# Patient Record
Sex: MALE | Race: WHITE | NOT HISPANIC OR LATINO | Employment: FULL TIME | URBAN - NONMETROPOLITAN AREA
[De-identification: names, ages, dates, MRNs, and addresses within clinical notes are randomized per-mention and may not be internally consistent; named-entity substitution may affect disease eponyms.]

---

## 2017-01-09 ENCOUNTER — OFFICE VISIT (OUTPATIENT)
Dept: PODIATRY | Facility: CLINIC | Age: 37
End: 2017-01-09

## 2017-01-09 VITALS
SYSTOLIC BLOOD PRESSURE: 140 MMHG | HEART RATE: 90 BPM | HEIGHT: 74 IN | BODY MASS INDEX: 40.43 KG/M2 | WEIGHT: 315 LBS | DIASTOLIC BLOOD PRESSURE: 80 MMHG

## 2017-01-09 DIAGNOSIS — G89.29 CHRONIC PAIN OF RIGHT ANKLE: Primary | ICD-10-CM

## 2017-01-09 DIAGNOSIS — M25.571 CHRONIC PAIN OF RIGHT ANKLE: Primary | ICD-10-CM

## 2017-01-09 DIAGNOSIS — Q68.8 OS TRIGONUM SYNDROME: ICD-10-CM

## 2017-01-09 DIAGNOSIS — M21.371 FOOT DROP, RIGHT: ICD-10-CM

## 2017-01-09 PROCEDURE — 99213 OFFICE O/P EST LOW 20 MIN: CPT | Performed by: PODIATRIST

## 2017-01-09 PROCEDURE — 96372 THER/PROPH/DIAG INJ SC/IM: CPT | Performed by: PODIATRIST

## 2017-01-09 PROCEDURE — 20605 DRAIN/INJ JOINT/BURSA W/O US: CPT | Performed by: PODIATRIST

## 2017-01-09 NOTE — PROGRESS NOTES
1.5cc Marcaine 0.5% NDC 6618-8224-43, Lot# [id number], Exp 1 May 2018  1.5cc Dexamethasone 10mg.ml NDC 1617-2770-66, Lot#[id number], Exp 2/2018

## 2017-01-09 NOTE — PROGRESS NOTES
Procedure    Medium Joint Arthrocentesis  Date/Time: 1/9/2017 12:05 PM  Consent given by: patient  Site marked: site marked  Timeout: Immediately prior to procedure a time out was called to verify the correct patient, procedure, equipment, support staff and site/side marked as required   Supporting Documentation  Indications: pain and diagnostic evaluation   Procedure Details  Location: ankle - R ankle  Needle size: 22 G  Approach: anterolateral  Medication group details: 1.5cc 0.5% Marcaine plain; 1.5cc Dexamethasone.  Patient tolerance: patient tolerated the procedure well with no immediate complications

## 2017-01-09 NOTE — MR AVS SNAPSHOT
Kirill Mendoza   1/9/2017 11:00 AM   Office Visit    Dept Phone:  [phone]   Encounter #:  [id number]    Provider:  Bunny Jc DPM   Department:  [facility]                Your Full Care Plan              Your Updated Medication List          This list is accurate as of: 1/9/17 11:45 AM.  Always use your most recent med list.                aspirin 81 MG EC tablet       atorvastatin 20 MG tablet   Commonly known as:  LIPITOR       metFORMIN 1000 MG tablet   Commonly known as:  GLUCOPHAGE       TRADJENTA 5 MG tablet tablet   Generic drug:  linagliptin               You Were Diagnosed With        Codes Comments    Chronic pain of right ankle    -  Primary ICD-10-CM: M25.571, G89.29  ICD-9-CM: 719.47, 338.29     Os trigonum syndrome     ICD-10-CM: Q68.8  ICD-9-CM: 755.69     Foot drop, right     ICD-10-CM: M21.371  ICD-9-CM: 736.79       Instructions    Joint Injection, Care After  Refer to this sheet in the next few days. These instructions provide you with information on caring for yourself after you have had a joint injection. Your caregiver also may give you more specific instructions. Your treatment has been planned according to current medical practices, but problems sometimes occur. Call your caregiver if you have any problems or questions after your procedure.  After any type of joint injection, it is not uncommon to experience:  · Soreness, swelling, or bruising around the injection site.  · Mild numbness, tingling, or weakness around the injection site caused by the numbing medicine used before or with the injection.  It also is possible to experience the following effects associated with the specific agent after injection:  · Iodine-based contrast agents:    Allergic reaction (itching, hives, widespread redness, and swelling beyond the injection site).  · Corticosteroids (These effects are rare.):    Allergic reaction.    Increased blood sugar  levels (If you have diabetes and you notice that your blood sugar levels have increased, notify your caregiver).    Increased blood pressure levels.    Mood swings.  · Hyaluronic acid in the use of viscosupplementation.    Temporary heat or redness.    Temporary rash and itching.    Increased fluid accumulation in the injected joint.  These effects all should resolve within a day after your procedure.   HOME CARE INSTRUCTIONS  · Limit yourself to light activity the day of your procedure. Avoid lifting heavy objects, bending, stooping, or twisting.  · Take prescription or over-the-counter pain medication as directed by your caregiver.  · You may apply ice to your injection site to reduce pain and swelling the day of your procedure. Ice may be applied 03-04 times:    Put ice in a plastic bag.    Place a towel between your skin and the bag.    Leave the ice on for no longer than 15-20 minutes each time.  SEEK IMMEDIATE MEDICAL CARE IF:   · Pain and swelling get worse rather than better or extend beyond the injection site.  · Numbness does not go away.  · Blood or fluid continues to leak from the injection site.  · You have chest pain.  · You have swelling of your face or tongue.  · You have trouble breathing or you become dizzy.  · You develop a fever, chills, or severe tenderness at the injection site that last longer than 1 day.  MAKE SURE YOU:  · Understand these instructions.  · Watch your condition.  · Get help right away if you are not doing well or if you get worse.     This information is not intended to replace advice given to you by your health care provider. Make sure you discuss any questions you have with your health care provider.     Document Released: 08/30/2012 Document Revised: 01/08/2016 Document Reviewed: 08/30/2012  [employer] Interactive Patient Education ©2016 [employer]    Ankle Pain  Ankle pain is a common symptom. The bones, cartilage, tendons, and muscles of the ankle joint perform a lot of  work each day. The ankle joint holds your body weight and allows you to move around. Ankle pain can occur on either side or back of 1 or both ankles. Ankle pain may be sharp and burning or dull and aching. There may be tenderness, stiffness, redness, or warmth around the ankle. The pain occurs more often when a person walks or puts pressure on the ankle.  CAUSES   There are many reasons ankle pain can develop. It is important to work with your caregiver to identify the cause since many conditions can impact the bones, cartilage, muscles, and tendons. Causes for ankle pain include:  · Injury, including a break (fracture), sprain, or strain often due to a fall, sports, or a high-impact activity.  · Swelling (inflammation) of a tendon (tendonitis).  · Achilles tendon rupture.  · Ankle instability after repeated sprains and strains.  · Poor foot alignment.  · Pressure on a nerve (tarsal tunnel syndrome).  · Arthritis in the ankle or the lining of the ankle.  · Crystal formation in the ankle (gout or pseudogout).  DIAGNOSIS   A diagnosis is based on your medical history, your symptoms, results of your physical exam, and results of diagnostic tests. Diagnostic tests may include X-ray exams or a computerized magnetic scan (magnetic resonance imaging, MRI).  TREATMENT   Treatment will depend on the cause of your ankle pain and may include:  · Keeping pressure off the ankle and limiting activities.  · Using crutches or other walking support (a cane or brace).  · Using rest, ice, compression, and elevation.  · Participating in physical therapy or home exercises.  · Wearing shoe inserts or special shoes.  · Losing weight.  · Taking medications to reduce pain or swelling or receiving an injection.  · Undergoing surgery.  HOME CARE INSTRUCTIONS   · Only take over-the-counter or prescription medicines for pain, discomfort, or fever as directed by your caregiver.  · Put ice on the injured area.    Put ice in a plastic bag.     Place a towel between your skin and the bag.    Leave the ice on for 15-20 minutes at a time, 03-04 times a day.  · Keep your leg raised (elevated) when possible to lessen swelling.  · Avoid activities that cause ankle pain.  · Follow specific exercises as directed by your caregiver.  · Record how often you have ankle pain, the location of the pain, and what it feels like. This information may be helpful to you and your caregiver.  · Ask your caregiver about returning to work or sports and whether you should drive.  · Follow up with your caregiver for further examination, therapy, or testing as directed.  SEEK MEDICAL CARE IF:   · Pain or swelling continues or worsens beyond 1 week.  · You have an oral temperature above 102° F (38.9° C).  · You are feeling unwell or have chills.  · You are having an increasingly difficult time with walking.  · You have loss of sensation or other new symptoms.  · You have questions or concerns.  MAKE SURE YOU:   · Understand these instructions.  · Will watch your condition.  · Will get help right away if you are not doing well or get worse.     This information is not intended to replace advice given to you by your health care provider. Make sure you discuss any questions you have with your health care provider.     Document Released: 06/07/2011 Document Revised: 03/11/2013 Document Reviewed: 07/19/2016  [employer] Interactive Patient Education ©2016 [employer]       Patient Instructions History      Upcoming Appointments     Visit Type Date Time Department    FOLLOW UP 1/9/2017 11:00 AM [facility]    FOLLOW UP 2/20/2017 11:45 AM [facility]      MyChart Signup     Our records indicate that you have declined [employer] MyChart signup. If you would like to sign up for [employer], please email [email or web address] or call [phone] to obtain an activation code.             Other Info from Your Visit           Your Appointments     Feb 20, 2017 11:45 AM CST  "  Follow Up with Bunny Jc DPM   [facility] (--)    [facility] (--)   [phone]           Arrive 15 minutes prior to appointment.              Allergies     No Known Allergies      Reason for Visit     Follow-up right foot pain-no change. Had MRI 5th of last month      Vital Signs     Blood Pressure Pulse Height Weight Body Mass Index Smoking Status    140/80 90 74\" (188 cm) 400 lb (181 kg) 51.36 kg/m2 Current Every Day Smoker      Problems and Diagnoses Noted     Chronic pain of right ankle    Right foot drop    Congenital anomaly of foot        "

## 2017-01-09 NOTE — PROGRESS NOTES
Today's Date: 01/09/2017    Kirill Mendoza  MRN: [id number]  CSN: [id number]  PCP: Doroteo Fleming MD      SUBJECTIVE     Chief Complaint   Patient presents with   • Follow-up     right foot pain-no change. Had MRI 5th of last month     HPI: Kirill Mendoza, a 36 y.o.male, presents to clinic as a(n) established patient complaining of right ankle pain. Pt has h/o of right drop foot with peroneal decompression. Admits diabetes and tobacco use. Relates working as  and has foot plantarflexed while driving. Since his last appt he has had similar level of pain and prefers not to wear his AFO. States he is unable to wear a CAM boot due to the his job.  Denies any constitutional symptoms. No other pedal complaints at this time.    Past Medical History   Diagnosis Date   • Anxiety    • Diabetes mellitus    • Hyperlipidemia    • Tinea pedis    • Tobacco abuse        Past Surgical History   Procedure Laterality Date   • Peroneal nerve decompression     • Knee debridement         Family History   Problem Relation Age of Onset   • Diabetes Father    • Hypertension Father    • Heart attack Mother    • Peripheral vascular disease Mother        Social History     Social History   • Marital status: Single     Spouse name: N/A   • Number of children: N/A   • Years of education: N/A     Occupational History   • Not on file.     Social History Main Topics   • Smoking status: Current Every Day Smoker     Packs/day: 1.50     Years: 20.00   • Smokeless tobacco: Never Used   • Alcohol use Yes      Comment: occasional   • Drug use: No   • Sexual activity: Defer     Other Topics Concern   • Not on file     Social History Narrative       No Known Allergies    Prior to Admission medications    Medication Sig Start Date End Date Taking? Authorizing Provider   aspirin 81 MG EC tablet Take 81 mg by mouth Daily.   Yes Historical Provider, MD   atorvastatin (LIPITOR) 20 MG tablet Take 20 mg by mouth Daily.   Yes Historical  Provider, MD   linagliptin (TRADJENTA) 5 MG tablet tablet Take 1 tablet by mouth daily 11/21/16  Yes Historical Provider, MD   metFORMIN (GLUCOPHAGE) 1000 MG tablet Take 1,000 mg by mouth 2 (Two) Times a Day.   Yes Historical Provider, MD       Review of Systems   Constitutional: Negative for chills and fever.   HENT: Negative for congestion.    Respiratory: Negative for shortness of breath.    Cardiovascular: Negative for chest pain.   Gastrointestinal: Negative for constipation, diarrhea, nausea and vomiting.   Musculoskeletal:        Right ankle pain   Skin: Negative for wound.   Neurological: Positive for numbness.       OBJECTIVE     Vitals:    01/09/17 1109   BP: 140/80   Pulse: 90       PHYSICAL EXAM  GEN:   A&Ox3, NAD. Pt ambulates to clinic without assistance and wearing casual shoes with right AFO.    NEURO:   Protective sensation intact to 10/10 sites Right foot, 10/10 site Left foot using [place] monofilament  Light touch sensation diminished.  No Tinel's or Villeux sign.    VASC:  Skin temperature warm to warm proximal to distal leonardo  DP pulses 2/4 Right, 2/4 Left  PT pulses 2/4 Right, 2/4 Left  CFT <3sec leonardo  No varicosities noted leonardo    MUSC/SKEL:  Muscle Strength Right foot Dorsiflexors 3+/5, Plantarflexors 5/5, Evertors 5/5, Invertors 5/5  Muscle Strength Left foot Dorsiflexors 5/5, Plantarflexors 5/5, Evertors 5/5, Invertors 5/5  POP distal posterior Achilles with noted bony enlargement.  Pain is within [place] and continues along medial and lateral ankle  Pain with end range dorsiflexion    DERM:  Pedal nails 1-5 leonardo are within normal limits of length and thickness  Webspaces are c/d/i  Flaky patch noted to right medial heel and mid arch  Stable cicatrix noted to right proximal fibula      PATHOLOGY RESULTS:      RADIOLOGY/NUCLEAR:  IMPRESSION:  1. Linear signal in the proximal fourth metatarsal may represent a  nondisplaced stress-type fracture.  2. Edema suspected within the  flexor hallux longus muscle with minimal  fluid seen adjacent to the tendon posterior to the talus. No tendon tear  identified.  3. No abnormal suspicious enhancement.      COMMENTS: A chronic anterior tibiofibular ligament injury is considered.  Talofibular ligaments are preserved.  This report was finalized on 12/05/2016 16:53 by Dr. Chanel Alston MD.    IMPRESSION-   Calcaneal spurring at the Achilles insertion. No other abnormality  Noted.      LABORATORY/CULTURE RESULTS:     Lab Results (last 24 hours)     ** No results found for the last 24 hours. **          ASSESSMENT/PLAN     Patient Active Problem List   Diagnosis   • Os trigonum syndrome   • Chronic pain of right ankle   • Foot drop, right         ICD-10-CM ICD-9-CM   1. Chronic pain of right ankle M25.571 719.47    G89.29 338.29   2. Os trigonum syndrome Q68.8 755.69   3. Foot drop, right M21.371 736.79     -Pt was examined and evaluated  -Discussed findings and treatment options with patient in detail  -Reviewed any pertinent xrays, labs and studies from pt chart  -After personally reviewing MRI alongside xrays, I believe pt is suffering from Os trigonum Syndrome which is being exacerbated due to his drop foot and type of work.   -Discussed treatment options with pt including conservative (CAM), injection or surgical intervention. Due to work he is unable to use CAM and pt is not a good surgical candidate at this time.   -Opts for corticosteroid injection, Written consent obtained  -Prepped with betadine, injection of 1.5cc 0.5% Marcaine plain, 1.5cc Dexamethasone   -Pt tolerated well  -RTC 1 month(s), or sooner if acute issues arise.              Please note that portions of this note may have been completed with a voice recognition program. Efforts were made to edit the dictations, but occasionally words are mistranscribed.

## 2017-01-09 NOTE — PATIENT INSTRUCTIONS
Joint Injection, Care After  Refer to this sheet in the next few days. These instructions provide you with information on caring for yourself after you have had a joint injection. Your caregiver also may give you more specific instructions. Your treatment has been planned according to current medical practices, but problems sometimes occur. Call your caregiver if you have any problems or questions after your procedure.  After any type of joint injection, it is not uncommon to experience:  · Soreness, swelling, or bruising around the injection site.  · Mild numbness, tingling, or weakness around the injection site caused by the numbing medicine used before or with the injection.  It also is possible to experience the following effects associated with the specific agent after injection:  · Iodine-based contrast agents:    Allergic reaction (itching, hives, widespread redness, and swelling beyond the injection site).  · Corticosteroids (These effects are rare.):    Allergic reaction.    Increased blood sugar levels (If you have diabetes and you notice that your blood sugar levels have increased, notify your caregiver).    Increased blood pressure levels.    Mood swings.  · Hyaluronic acid in the use of viscosupplementation.    Temporary heat or redness.    Temporary rash and itching.    Increased fluid accumulation in the injected joint.  These effects all should resolve within a day after your procedure.   HOME CARE INSTRUCTIONS  · Limit yourself to light activity the day of your procedure. Avoid lifting heavy objects, bending, stooping, or twisting.  · Take prescription or over-the-counter pain medication as directed by your caregiver.  · You may apply ice to your injection site to reduce pain and swelling the day of your procedure. Ice may be applied 03-04 times:    Put ice in a plastic bag.    Place a towel between your skin and the bag.    Leave the ice on for no longer than 15-20 minutes each time.  SEEK  IMMEDIATE MEDICAL CARE IF:   · Pain and swelling get worse rather than better or extend beyond the injection site.  · Numbness does not go away.  · Blood or fluid continues to leak from the injection site.  · You have chest pain.  · You have swelling of your face or tongue.  · You have trouble breathing or you become dizzy.  · You develop a fever, chills, or severe tenderness at the injection site that last longer than 1 day.  MAKE SURE YOU:  · Understand these instructions.  · Watch your condition.  · Get help right away if you are not doing well or if you get worse.     This information is not intended to replace advice given to you by your health care provider. Make sure you discuss any questions you have with your health care provider.     Document Released: 08/30/2012 Document Revised: 01/08/2016 Document Reviewed: 08/30/2012  [employer] Interactive Patient Education ©2016 [employer]    Ankle Pain  Ankle pain is a common symptom. The bones, cartilage, tendons, and muscles of the ankle joint perform a lot of work each day. The ankle joint holds your body weight and allows you to move around. Ankle pain can occur on either side or back of 1 or both ankles. Ankle pain may be sharp and burning or dull and aching. There may be tenderness, stiffness, redness, or warmth around the ankle. The pain occurs more often when a person walks or puts pressure on the ankle.  CAUSES   There are many reasons ankle pain can develop. It is important to work with your caregiver to identify the cause since many conditions can impact the bones, cartilage, muscles, and tendons. Causes for ankle pain include:  · Injury, including a break (fracture), sprain, or strain often due to a fall, sports, or a high-impact activity.  · Swelling (inflammation) of a tendon (tendonitis).  · Achilles tendon rupture.  · Ankle instability after repeated sprains and strains.  · Poor foot alignment.  · Pressure on a nerve (tarsal tunnel  syndrome).  · Arthritis in the ankle or the lining of the ankle.  · Crystal formation in the ankle (gout or pseudogout).  DIAGNOSIS   A diagnosis is based on your medical history, your symptoms, results of your physical exam, and results of diagnostic tests. Diagnostic tests may include X-ray exams or a computerized magnetic scan (magnetic resonance imaging, MRI).  TREATMENT   Treatment will depend on the cause of your ankle pain and may include:  · Keeping pressure off the ankle and limiting activities.  · Using crutches or other walking support (a cane or brace).  · Using rest, ice, compression, and elevation.  · Participating in physical therapy or home exercises.  · Wearing shoe inserts or special shoes.  · Losing weight.  · Taking medications to reduce pain or swelling or receiving an injection.  · Undergoing surgery.  HOME CARE INSTRUCTIONS   · Only take over-the-counter or prescription medicines for pain, discomfort, or fever as directed by your caregiver.  · Put ice on the injured area.    Put ice in a plastic bag.    Place a towel between your skin and the bag.    Leave the ice on for 15-20 minutes at a time, 03-04 times a day.  · Keep your leg raised (elevated) when possible to lessen swelling.  · Avoid activities that cause ankle pain.  · Follow specific exercises as directed by your caregiver.  · Record how often you have ankle pain, the location of the pain, and what it feels like. This information may be helpful to you and your caregiver.  · Ask your caregiver about returning to work or sports and whether you should drive.  · Follow up with your caregiver for further examination, therapy, or testing as directed.  SEEK MEDICAL CARE IF:   · Pain or swelling continues or worsens beyond 1 week.  · You have an oral temperature above 102° F (38.9° C).  · You are feeling unwell or have chills.  · You are having an increasingly difficult time with walking.  · You have loss of sensation or other new  symptoms.  · You have questions or concerns.  MAKE SURE YOU:   · Understand these instructions.  · Will watch your condition.  · Will get help right away if you are not doing well or get worse.     This information is not intended to replace advice given to you by your health care provider. Make sure you discuss any questions you have with your health care provider.     Document Released: 06/07/2011 Document Revised: 03/11/2013 Document Reviewed: 07/19/2016  [employer] Interactive Patient Education ©2016 [employer]

## 2017-01-16 ENCOUNTER — TELEPHONE (OUTPATIENT)
Dept: PRIMARY CARE CLINIC | Age: 37
End: 2017-01-16

## 2017-01-20 RX ORDER — GLIPIZIDE 5 MG/1
5 TABLET, FILM COATED, EXTENDED RELEASE ORAL DAILY
Qty: 30 TABLET | Refills: 0 | Status: SHIPPED | OUTPATIENT
Start: 2017-01-20 | End: 2017-02-20

## 2017-02-20 ENCOUNTER — OFFICE VISIT (OUTPATIENT)
Dept: PRIMARY CARE CLINIC | Age: 37
End: 2017-02-20
Payer: COMMERCIAL

## 2017-02-20 ENCOUNTER — OFFICE VISIT (OUTPATIENT)
Dept: PODIATRY | Facility: CLINIC | Age: 37
End: 2017-02-20

## 2017-02-20 VITALS
OXYGEN SATURATION: 98 % | TEMPERATURE: 98 F | BODY MASS INDEX: 40.43 KG/M2 | HEART RATE: 88 BPM | WEIGHT: 315 LBS | SYSTOLIC BLOOD PRESSURE: 130 MMHG | DIASTOLIC BLOOD PRESSURE: 78 MMHG | HEIGHT: 74 IN | RESPIRATION RATE: 24 BRPM

## 2017-02-20 VITALS
SYSTOLIC BLOOD PRESSURE: 118 MMHG | DIASTOLIC BLOOD PRESSURE: 84 MMHG | HEART RATE: 86 BPM | WEIGHT: 315 LBS | HEIGHT: 74 IN | BODY MASS INDEX: 40.43 KG/M2

## 2017-02-20 DIAGNOSIS — Q68.8 OS TRIGONUM SYNDROME: ICD-10-CM

## 2017-02-20 DIAGNOSIS — J01.90 ACUTE SINUSITIS WITH SYMPTOMS GREATER THAN 10 DAYS: ICD-10-CM

## 2017-02-20 DIAGNOSIS — E11.69 HYPERLIPIDEMIA ASSOCIATED WITH TYPE 2 DIABETES MELLITUS (HCC): ICD-10-CM

## 2017-02-20 DIAGNOSIS — G89.29 CHRONIC MIDLINE THORACIC BACK PAIN: ICD-10-CM

## 2017-02-20 DIAGNOSIS — E78.5 HYPERLIPIDEMIA ASSOCIATED WITH TYPE 2 DIABETES MELLITUS (HCC): ICD-10-CM

## 2017-02-20 DIAGNOSIS — M25.571 CHRONIC PAIN OF RIGHT ANKLE: Primary | ICD-10-CM

## 2017-02-20 DIAGNOSIS — G89.29 CHRONIC PAIN OF RIGHT ANKLE: Primary | ICD-10-CM

## 2017-02-20 DIAGNOSIS — E11.42 TYPE 2 DIABETES MELLITUS WITH DIABETIC POLYNEUROPATHY, WITHOUT LONG-TERM CURRENT USE OF INSULIN (HCC): Primary | ICD-10-CM

## 2017-02-20 DIAGNOSIS — E66.01 MORBID OBESITY DUE TO EXCESS CALORIES (HCC): ICD-10-CM

## 2017-02-20 DIAGNOSIS — M54.6 CHRONIC MIDLINE THORACIC BACK PAIN: ICD-10-CM

## 2017-02-20 DIAGNOSIS — M21.371 FOOT DROP, RIGHT: ICD-10-CM

## 2017-02-20 PROCEDURE — 99213 OFFICE O/P EST LOW 20 MIN: CPT | Performed by: PODIATRIST

## 2017-02-20 PROCEDURE — 99214 OFFICE O/P EST MOD 30 MIN: CPT | Performed by: FAMILY MEDICINE

## 2017-02-20 RX ORDER — MELOXICAM 15 MG/1
15 TABLET ORAL DAILY
Qty: 30 TABLET | Refills: 3 | Status: SHIPPED | OUTPATIENT
Start: 2017-02-20 | End: 2017-07-24

## 2017-02-20 RX ORDER — GABAPENTIN 300 MG/1
300 CAPSULE ORAL 3 TIMES DAILY
Qty: 90 CAPSULE | Refills: 3 | Status: SHIPPED | OUTPATIENT
Start: 2017-02-20 | End: 2017-07-24 | Stop reason: ALTCHOICE

## 2017-02-20 RX ORDER — AMOXICILLIN AND CLAVULANATE POTASSIUM 875; 125 MG/1; MG/1
1 TABLET, FILM COATED ORAL 2 TIMES DAILY
Qty: 20 TABLET | Refills: 0 | Status: SHIPPED | OUTPATIENT
Start: 2017-02-20 | End: 2017-03-02

## 2017-02-20 NOTE — PROGRESS NOTES
[facility] - PODIATRY    Today's Date: 02/20/2017    Patient Name: Kirill Mendoza  MRN: [id number]  CSN: [id number]  PCP: Doroteo Fleming MD  Referring Provider: No ref. provider found    SUBJECTIVE     Chief Complaint   Patient presents with   • Follow-up     1 month f/u     HPI: Kirill Mendoza, a 36 y.o.male, comes to clinic as a(n) established patient complaining of ankle pain. Patient is NIDDM with last stated BG level of 187mg/dl. Admits pain at 8/10 level, described as shooting, aching, throbbing and sharp and centered around right ankle. States that the previous injection eliminated his pain day of, but that it returned the next day.. Denies any constitutional symptoms. No other pedal complaints at this time.    Past Medical History   Diagnosis Date   • Anxiety    • Diabetes mellitus    • Hyperlipidemia    • Tinea pedis    • Tobacco abuse      Past Surgical History   Procedure Laterality Date   • Peroneal nerve decompression     • Knee debridement       Family History   Problem Relation Age of Onset   • Diabetes Father    • Hypertension Father    • Heart attack Mother    • Peripheral vascular disease Mother      Social History     Social History   • Marital status: Single     Spouse name: N/A   • Number of children: N/A   • Years of education: N/A     Occupational History   • Not on file.     Social History Main Topics   • Smoking status: Current Every Day Smoker     Packs/day: 1.50     Years: 20.00   • Smokeless tobacco: Never Used   • Alcohol use No      Comment: occasional   • Drug use: No   • Sexual activity: Defer     Other Topics Concern   • Not on file     Social History Narrative     No Known Allergies  Current Outpatient Prescriptions   Medication Sig Dispense Refill   • aspirin 81 MG EC tablet Take 81 mg by mouth Daily.     • atorvastatin (LIPITOR) 20 MG tablet Take 20 mg by mouth Daily.     • metFORMIN (GLUCOPHAGE) 1000 MG tablet Take 1,000 mg by mouth 2 (Two) Times a Day.        No current facility-administered medications for this visit.      Review of Systems   Constitutional: Negative for chills and fever.   HENT: Negative for congestion.    Respiratory: Negative for shortness of breath.    Cardiovascular: Negative for chest pain.   Gastrointestinal: Negative for constipation, diarrhea, nausea and vomiting.   Musculoskeletal:        Right ankle pain   Skin: Negative for wound.   Neurological: Positive for numbness.       OBJECTIVE     Vitals:    02/20/17 1130   BP: 118/84   Pulse: 86       PHYSICAL EXAM  GEN:   A&Ox3, NAD. Pt presents to clinic ambulating without assistance and wearing Casual Shoes with right ankle brace.      NEURO:   Protective sensation intact to 10/10 sites Right foot, 10/10 site Left foot using [place] monofilament  Light touch sensation diminished.  No Tinel's or [place] sign.     VASC:  Skin temperature warm to warm proximal to distal leonardo  DP pulses 2/4 Right, 2/4 Left  PT pulses 2/4 Right, 2/4 Left  CFT <3sec leonardo  No varicosities noted leonardo     MUSC/SKEL:  Muscle Strength Right foot Dorsiflexors 3+/5, Plantarflexors 5/5, Evertors 5/5, Invertors 5/5  Muscle Strength Left foot Dorsiflexors 5/5, Plantarflexors 5/5, Evertors 5/5, Invertors 5/5  POP distal posterior Achilles with noted bony enlargement.  Pain is within [place] and continues along medial and lateral ankle  Pain with end range dorsiflexion     DERM:  Pedal nails 1-5 leonardo are within normal limits of length and thickness  Webspaces are c/d/i  Flaky patch noted to right medial heel and mid arch  Stable cicatrix noted to right proximal fibula      RADIOLOGY/NUCLEAR:  No results found.    LABORATORY/CULTURE RESULTS:      PATHOLOGY RESULTS:       ASSESSMENT/PLAN     Kirill was seen today for follow-up.    Diagnoses and all orders for this visit:    Chronic pain of right ankle    Os trigonum syndrome    Foot drop, right    Comprehensive lower extremity examination and evaluation was  performed.  Discussed findings and treatment plan including risks, benefits, and treatment options with patient in detail. Patient agreed with treatment plan.  Discussed that the pt has failed conservative therapy including bracing, immobilization, medication, and injections. Explained that he should consider surgical intervention for removal of ossicle. Pt has smoking hx and currently controlled DM.  An After Visit Summary was printed and given to the patient at discharge, including (if requested) any available informative/educational handouts regarding diagnosis, treatment, or medications. All questions were answered to patient/family satisfaction. Should symptoms fail to improve or worsen they agree to call or return to clinic or to go to the Emergency Department. Discussed the importance of following up with any needed screening tests/labs/specialist appointments and any requested follow-up recommended by me today. Importance of maintaining follow-up discussed and patient accepts that missed appointments can delay diagnosis and potentially lead to worsening of conditions.  Return if symptoms worsen or fail to improve., or sooner if acute issues arise.        This document has been electronically signed by Bunny Jc DPM on February 20, 2017 1:06 PM     Please note that portions of this note may have been completed with a voice recognition program. Efforts were made to edit the dictations, but occasionally words are mistranscribed.

## 2017-02-21 PROBLEM — E11.42 TYPE 2 DIABETES MELLITUS WITH DIABETIC POLYNEUROPATHY, WITHOUT LONG-TERM CURRENT USE OF INSULIN (HCC): Status: ACTIVE | Noted: 2017-02-21

## 2017-02-21 ASSESSMENT — ENCOUNTER SYMPTOMS
COLOR CHANGE: 0
DIARRHEA: 0
SINUS PRESSURE: 1
VOMITING: 0
CHEST TIGHTNESS: 0
ABDOMINAL PAIN: 0
NAUSEA: 0
EYE PAIN: 0
TROUBLE SWALLOWING: 0
SHORTNESS OF BREATH: 0
RHINORRHEA: 1
PHOTOPHOBIA: 0
SORE THROAT: 0
COUGH: 0
BACK PAIN: 1
WHEEZING: 0
CONSTIPATION: 0

## 2017-07-24 ENCOUNTER — OFFICE VISIT (OUTPATIENT)
Dept: PRIMARY CARE CLINIC | Age: 37
End: 2017-07-24
Payer: COMMERCIAL

## 2017-07-24 VITALS
OXYGEN SATURATION: 96 % | WEIGHT: 315 LBS | TEMPERATURE: 97.5 F | HEART RATE: 92 BPM | RESPIRATION RATE: 18 BRPM | HEIGHT: 74 IN | DIASTOLIC BLOOD PRESSURE: 76 MMHG | SYSTOLIC BLOOD PRESSURE: 138 MMHG | BODY MASS INDEX: 40.43 KG/M2

## 2017-07-24 DIAGNOSIS — S93.402A SPRAIN OF LEFT ANKLE, UNSPECIFIED LIGAMENT, INITIAL ENCOUNTER: ICD-10-CM

## 2017-07-24 DIAGNOSIS — F17.200 TOBACCO USE DISORDER: ICD-10-CM

## 2017-07-24 DIAGNOSIS — M77.51 BONE SPUR OF RIGHT ANKLE: ICD-10-CM

## 2017-07-24 DIAGNOSIS — M51.36 DEGENERATIVE DISC DISEASE, LUMBAR: Primary | ICD-10-CM

## 2017-07-24 DIAGNOSIS — E11.42 TYPE 2 DIABETES MELLITUS WITH DIABETIC POLYNEUROPATHY, WITHOUT LONG-TERM CURRENT USE OF INSULIN (HCC): ICD-10-CM

## 2017-07-24 LAB — HBA1C MFR BLD: 6.6 %

## 2017-07-24 PROCEDURE — 99406 BEHAV CHNG SMOKING 3-10 MIN: CPT | Performed by: FAMILY MEDICINE

## 2017-07-24 PROCEDURE — 83036 HEMOGLOBIN GLYCOSYLATED A1C: CPT | Performed by: FAMILY MEDICINE

## 2017-07-24 PROCEDURE — 99214 OFFICE O/P EST MOD 30 MIN: CPT | Performed by: FAMILY MEDICINE

## 2017-07-24 RX ORDER — VARENICLINE TARTRATE 25 MG
KIT ORAL
Qty: 1 EACH | Refills: 0 | Status: SHIPPED | OUTPATIENT
Start: 2017-07-24 | End: 2018-04-02 | Stop reason: ALTCHOICE

## 2017-07-24 RX ORDER — VARENICLINE TARTRATE 1 MG/1
1 TABLET, FILM COATED ORAL 2 TIMES DAILY
Qty: 60 TABLET | Refills: 3 | Status: SHIPPED | OUTPATIENT
Start: 2017-07-24 | End: 2017-09-25 | Stop reason: SDUPTHER

## 2017-07-24 RX ORDER — GABAPENTIN 300 MG/1
300 CAPSULE ORAL 3 TIMES DAILY
Qty: 90 CAPSULE | Refills: 3 | Status: SHIPPED | OUTPATIENT
Start: 2017-07-24 | End: 2018-07-16 | Stop reason: SDUPTHER

## 2017-07-25 ASSESSMENT — ENCOUNTER SYMPTOMS
EYE PAIN: 0
COUGH: 0
RHINORRHEA: 0
NAUSEA: 0
WHEEZING: 0
ABDOMINAL PAIN: 0
CONSTIPATION: 0
VOMITING: 0
CHEST TIGHTNESS: 0
SORE THROAT: 0
COLOR CHANGE: 0
BACK PAIN: 1
TROUBLE SWALLOWING: 0
PHOTOPHOBIA: 0
SHORTNESS OF BREATH: 0
DIARRHEA: 0

## 2017-07-27 ENCOUNTER — TELEPHONE (OUTPATIENT)
Dept: PRIMARY CARE CLINIC | Age: 37
End: 2017-07-27

## 2017-09-25 ENCOUNTER — OFFICE VISIT (OUTPATIENT)
Dept: PRIMARY CARE CLINIC | Age: 37
End: 2017-09-25
Payer: COMMERCIAL

## 2017-09-25 VITALS
WEIGHT: 315 LBS | OXYGEN SATURATION: 97 % | DIASTOLIC BLOOD PRESSURE: 68 MMHG | HEIGHT: 74 IN | SYSTOLIC BLOOD PRESSURE: 110 MMHG | HEART RATE: 81 BPM | TEMPERATURE: 98 F | BODY MASS INDEX: 40.43 KG/M2 | RESPIRATION RATE: 20 BRPM

## 2017-09-25 DIAGNOSIS — E11.42 TYPE 2 DIABETES MELLITUS WITH DIABETIC POLYNEUROPATHY, WITHOUT LONG-TERM CURRENT USE OF INSULIN (HCC): ICD-10-CM

## 2017-09-25 DIAGNOSIS — R20.2 PARESTHESIA OF RIGHT FOOT: ICD-10-CM

## 2017-09-25 DIAGNOSIS — F17.200 TOBACCO USE DISORDER: ICD-10-CM

## 2017-09-25 DIAGNOSIS — M54.9 TRIGGER POINT WITH BACK PAIN: Primary | ICD-10-CM

## 2017-09-25 DIAGNOSIS — E66.01 MORBID OBESITY DUE TO EXCESS CALORIES (HCC): ICD-10-CM

## 2017-09-25 PROCEDURE — 99214 OFFICE O/P EST MOD 30 MIN: CPT | Performed by: FAMILY MEDICINE

## 2017-09-25 RX ORDER — VARENICLINE TARTRATE 1 MG/1
1 TABLET, FILM COATED ORAL 2 TIMES DAILY
Qty: 60 TABLET | Refills: 3 | Status: SHIPPED | OUTPATIENT
Start: 2017-09-25 | End: 2018-04-02 | Stop reason: ALTCHOICE

## 2017-09-25 RX ORDER — DICLOFENAC 35 MG/1
CAPSULE ORAL
COMMUNITY
End: 2018-04-02 | Stop reason: ALTCHOICE

## 2017-09-26 ASSESSMENT — ENCOUNTER SYMPTOMS
WHEEZING: 0
NAUSEA: 0
ABDOMINAL PAIN: 0
VOMITING: 0
CONSTIPATION: 0
BACK PAIN: 1
SHORTNESS OF BREATH: 0
CHEST TIGHTNESS: 0
DIARRHEA: 0
COUGH: 0

## 2018-04-02 ENCOUNTER — OFFICE VISIT (OUTPATIENT)
Dept: PRIMARY CARE CLINIC | Age: 38
End: 2018-04-02
Payer: COMMERCIAL

## 2018-04-02 VITALS
BODY MASS INDEX: 52.38 KG/M2 | WEIGHT: 315 LBS | TEMPERATURE: 97.6 F | DIASTOLIC BLOOD PRESSURE: 66 MMHG | HEART RATE: 88 BPM | OXYGEN SATURATION: 97 % | SYSTOLIC BLOOD PRESSURE: 138 MMHG

## 2018-04-02 DIAGNOSIS — F41.1 GENERALIZED ANXIETY DISORDER: ICD-10-CM

## 2018-04-02 DIAGNOSIS — E11.42 TYPE 2 DIABETES MELLITUS WITH DIABETIC POLYNEUROPATHY, WITHOUT LONG-TERM CURRENT USE OF INSULIN (HCC): Primary | ICD-10-CM

## 2018-04-02 DIAGNOSIS — E11.42 TYPE 2 DIABETES MELLITUS WITH DIABETIC POLYNEUROPATHY, WITHOUT LONG-TERM CURRENT USE OF INSULIN (HCC): ICD-10-CM

## 2018-04-02 LAB
ALBUMIN SERPL-MCNC: 4.3 G/DL (ref 3.5–5.2)
ALP BLD-CCNC: 61 U/L (ref 40–130)
ALT SERPL-CCNC: 33 U/L (ref 5–41)
ANION GAP SERPL CALCULATED.3IONS-SCNC: 16 MMOL/L (ref 7–19)
AST SERPL-CCNC: 24 U/L (ref 5–40)
BILIRUB SERPL-MCNC: 0.4 MG/DL (ref 0.2–1.2)
BILIRUBIN URINE: NEGATIVE
BLOOD, URINE: NEGATIVE
BUN BLDV-MCNC: 14 MG/DL (ref 6–20)
CALCIUM SERPL-MCNC: 10 MG/DL (ref 8.6–10)
CHLORIDE BLD-SCNC: 103 MMOL/L (ref 98–111)
CLARITY: CLEAR
CO2: 28 MMOL/L (ref 22–29)
COLOR: YELLOW
CREAT SERPL-MCNC: 0.8 MG/DL (ref 0.5–1.2)
CREATININE URINE: 236.4 MG/DL (ref 4.2–622)
GFR NON-AFRICAN AMERICAN: >60
GLUCOSE BLD-MCNC: 137 MG/DL (ref 74–109)
GLUCOSE URINE: 100 MG/DL
HBA1C MFR BLD: 7.2 %
HCT VFR BLD CALC: 44.5 % (ref 42–52)
HEMOGLOBIN: 14.9 G/DL (ref 14–18)
KETONES, URINE: NEGATIVE MG/DL
LEUKOCYTE ESTERASE, URINE: NEGATIVE
MCH RBC QN AUTO: 30.5 PG (ref 27–31)
MCHC RBC AUTO-ENTMCNC: 33.5 G/DL (ref 33–37)
MCV RBC AUTO: 91.2 FL (ref 80–94)
NITRITE, URINE: NEGATIVE
PDW BLD-RTO: 12.4 % (ref 11.5–14.5)
PH UA: 6
PLATELET # BLD: 286 K/UL (ref 130–400)
PMV BLD AUTO: 10.8 FL (ref 9.4–12.4)
POTASSIUM SERPL-SCNC: 4.5 MMOL/L (ref 3.5–5)
PROTEIN PROTEIN: 18 MG/DL (ref 15–45)
PROTEIN UA: NEGATIVE MG/DL
RBC # BLD: 4.88 M/UL (ref 4.7–6.1)
SODIUM BLD-SCNC: 147 MMOL/L (ref 136–145)
SPECIFIC GRAVITY UA: 1.02
TOTAL PROTEIN: 7.6 G/DL (ref 6.6–8.7)
UROBILINOGEN, URINE: 0.2 E.U./DL
WBC # BLD: 9.3 K/UL (ref 4.8–10.8)

## 2018-04-02 PROCEDURE — 99214 OFFICE O/P EST MOD 30 MIN: CPT | Performed by: INTERNAL MEDICINE

## 2018-04-02 RX ORDER — IBUPROFEN 400 MG/1
400 TABLET ORAL EVERY 6 HOURS PRN
COMMUNITY
End: 2019-09-30

## 2018-04-02 RX ORDER — MAGNESIUM 30 MG
30 TABLET ORAL 2 TIMES DAILY
COMMUNITY
End: 2018-07-16

## 2018-04-02 RX ORDER — ESCITALOPRAM OXALATE 10 MG/1
10 TABLET ORAL DAILY
Qty: 30 TABLET | Refills: 3 | Status: SHIPPED | OUTPATIENT
Start: 2018-04-02 | End: 2018-07-16

## 2018-04-02 RX ORDER — B-COMPLEX WITH VITAMIN C
TABLET ORAL
COMMUNITY
End: 2018-07-16

## 2018-04-02 RX ORDER — COVID-19 ANTIGEN TEST
KIT MISCELLANEOUS
COMMUNITY
End: 2019-09-30

## 2018-04-02 ASSESSMENT — ENCOUNTER SYMPTOMS
VOMITING: 0
CONSTIPATION: 0
SINUS PRESSURE: 0
SHORTNESS OF BREATH: 0
NAUSEA: 0
SINUS PAIN: 0
DIARRHEA: 0
COUGH: 0
BACK PAIN: 1

## 2018-04-02 ASSESSMENT — PATIENT HEALTH QUESTIONNAIRE - PHQ9
2. FEELING DOWN, DEPRESSED OR HOPELESS: 0
SUM OF ALL RESPONSES TO PHQ9 QUESTIONS 1 & 2: 0
SUM OF ALL RESPONSES TO PHQ QUESTIONS 1-9: 0
1. LITTLE INTEREST OR PLEASURE IN DOING THINGS: 0

## 2018-07-16 ENCOUNTER — OFFICE VISIT (OUTPATIENT)
Dept: PRIMARY CARE CLINIC | Age: 38
End: 2018-07-16
Payer: COMMERCIAL

## 2018-07-16 VITALS
TEMPERATURE: 98 F | SYSTOLIC BLOOD PRESSURE: 132 MMHG | HEIGHT: 74 IN | RESPIRATION RATE: 20 BRPM | BODY MASS INDEX: 40.43 KG/M2 | DIASTOLIC BLOOD PRESSURE: 70 MMHG | WEIGHT: 315 LBS | OXYGEN SATURATION: 99 % | HEART RATE: 68 BPM

## 2018-07-16 DIAGNOSIS — E66.01 MORBID OBESITY WITH BMI OF 50.0-59.9, ADULT (HCC): ICD-10-CM

## 2018-07-16 DIAGNOSIS — E11.42 DIABETIC PERIPHERAL NEUROPATHY ASSOCIATED WITH TYPE 2 DIABETES MELLITUS (HCC): ICD-10-CM

## 2018-07-16 LAB — HBA1C MFR BLD: 7.8 %

## 2018-07-16 PROCEDURE — 83036 HEMOGLOBIN GLYCOSYLATED A1C: CPT | Performed by: FAMILY MEDICINE

## 2018-07-16 PROCEDURE — 99214 OFFICE O/P EST MOD 30 MIN: CPT | Performed by: FAMILY MEDICINE

## 2018-07-16 RX ORDER — GABAPENTIN 300 MG/1
300 CAPSULE ORAL 3 TIMES DAILY
Qty: 90 CAPSULE | Refills: 3 | Status: SHIPPED | OUTPATIENT
Start: 2018-07-16 | End: 2019-09-30 | Stop reason: SDUPTHER

## 2018-07-16 ASSESSMENT — ENCOUNTER SYMPTOMS
DIARRHEA: 0
NAUSEA: 0
WHEEZING: 0
CHEST TIGHTNESS: 0
SHORTNESS OF BREATH: 0
ABDOMINAL PAIN: 0
VOMITING: 0
COUGH: 0
CONSTIPATION: 0

## 2018-07-16 NOTE — PROGRESS NOTES
5/31/2016    PERONEAL NERVE RELEASE ; RIGHT FIBULAR HEAD  performed by Wes Cardoso MD at [place] [address] History   Substance Use Topics    Smoking status: Current Every Day Smoker     Packs/day: 1.00     Years: 20.00     Types: Cigarettes    Smokeless tobacco: Never Used    Alcohol use Yes      Comment: occ       Family History   Problem Relation Age of Onset    Other Mother         blockage    High Blood Pressure Father     Diabetes Father        /70   Pulse 68   Temp 98 °F (36.7 °C) (Temporal)   Resp 20   Ht 6' 2\" (1.88 m)   Wt (!) 420 lb (190.5 kg)   SpO2 99%   BMI 53.92 kg/m²     Physical Exam   Constitutional: He is oriented to person, place, and time. He appears well-developed and well-nourished. Non-toxic appearance. No distress. HENT:   Head: Normocephalic and atraumatic. Cardiovascular: Normal rate, regular rhythm, normal heart sounds and intact distal pulses. Exam reveals no gallop and no friction rub. No murmur heard. Pulmonary/Chest: Effort normal and breath sounds normal. No respiratory distress. He has no wheezes. He has no rales. He exhibits no tenderness. Abdominal: Soft. Bowel sounds are normal. He exhibits no distension and no mass. There is no tenderness. There is no rebound and no guarding. Central obesity   Musculoskeletal:        Right lower leg: He exhibits no edema. Left lower leg: He exhibits no edema. Neurological: He is alert and oriented to person, place, and time. Coordination and gait normal.   Skin: Skin is warm and dry. He is not diaphoretic. No cyanosis. Nails show no clubbing. Psychiatric: His speech is normal and behavior is normal. Judgment normal. His mood appears not anxious. Cognition and memory are normal. He does not exhibit a depressed mood. He expresses no homicidal and no suicidal ideation. Nursing note and vitals reviewed. Assessment:    ICD-10-CM ICD-9-CM    1.  Uncontrolled type 2 diabetes mellitus with

## 2018-10-15 ENCOUNTER — OFFICE VISIT (OUTPATIENT)
Dept: PRIMARY CARE CLINIC | Age: 38
End: 2018-10-15
Payer: COMMERCIAL

## 2018-10-15 VITALS
DIASTOLIC BLOOD PRESSURE: 72 MMHG | SYSTOLIC BLOOD PRESSURE: 122 MMHG | HEART RATE: 77 BPM | TEMPERATURE: 98.2 F | RESPIRATION RATE: 20 BRPM | OXYGEN SATURATION: 98 % | BODY MASS INDEX: 40.43 KG/M2 | HEIGHT: 74 IN | WEIGHT: 315 LBS

## 2018-10-15 DIAGNOSIS — M25.551 RIGHT HIP PAIN: ICD-10-CM

## 2018-10-15 DIAGNOSIS — G62.9 NEUROPATHY: ICD-10-CM

## 2018-10-15 DIAGNOSIS — M25.552 LEFT HIP PAIN: ICD-10-CM

## 2018-10-15 DIAGNOSIS — E11.42 TYPE 2 DIABETES MELLITUS WITH DIABETIC POLYNEUROPATHY, WITHOUT LONG-TERM CURRENT USE OF INSULIN (HCC): Primary | ICD-10-CM

## 2018-10-15 LAB — HBA1C MFR BLD: 7.8 %

## 2018-10-15 PROCEDURE — 99213 OFFICE O/P EST LOW 20 MIN: CPT | Performed by: FAMILY MEDICINE

## 2018-10-15 PROCEDURE — 83036 HEMOGLOBIN GLYCOSYLATED A1C: CPT | Performed by: FAMILY MEDICINE

## 2018-10-15 PROCEDURE — 20610 DRAIN/INJ JOINT/BURSA W/O US: CPT | Performed by: FAMILY MEDICINE

## 2018-10-15 RX ORDER — NATEGLINIDE 60 MG/1
60 TABLET ORAL
Qty: 90 TABLET | Refills: 3 | Status: SHIPPED | OUTPATIENT
Start: 2018-10-15 | End: 2020-03-30 | Stop reason: SDUPTHER

## 2018-10-15 RX ORDER — TRIAMCINOLONE ACETONIDE 40 MG/ML
40 INJECTION, SUSPENSION INTRA-ARTICULAR; INTRAMUSCULAR ONCE
Status: COMPLETED | OUTPATIENT
Start: 2018-10-15 | End: 2018-10-15

## 2018-10-15 RX ADMIN — TRIAMCINOLONE ACETONIDE 40 MG: 40 INJECTION, SUSPENSION INTRA-ARTICULAR; INTRAMUSCULAR at 10:22

## 2018-10-15 RX ADMIN — TRIAMCINOLONE ACETONIDE 40 MG: 40 INJECTION, SUSPENSION INTRA-ARTICULAR; INTRAMUSCULAR at 10:23

## 2018-10-15 ASSESSMENT — ENCOUNTER SYMPTOMS
VOMITING: 0
CHEST TIGHTNESS: 0
SHORTNESS OF BREATH: 0
ABDOMINAL PAIN: 0
WHEEZING: 0
CONSTIPATION: 0
COUGH: 0
NAUSEA: 0
DIARRHEA: 0

## 2018-10-23 ENCOUNTER — TELEPHONE (OUTPATIENT)
Dept: PRIMARY CARE CLINIC | Age: 38
End: 2018-10-23

## 2019-01-21 ENCOUNTER — OFFICE VISIT (OUTPATIENT)
Dept: PRIMARY CARE CLINIC | Age: 39
End: 2019-01-21
Payer: COMMERCIAL

## 2019-01-21 VITALS
HEIGHT: 74 IN | SYSTOLIC BLOOD PRESSURE: 136 MMHG | DIASTOLIC BLOOD PRESSURE: 78 MMHG | OXYGEN SATURATION: 97 % | HEART RATE: 85 BPM | BODY MASS INDEX: 40.43 KG/M2 | WEIGHT: 315 LBS | TEMPERATURE: 98.2 F

## 2019-01-21 DIAGNOSIS — M70.62 GREATER TROCHANTERIC BURSITIS OF LEFT HIP: ICD-10-CM

## 2019-01-21 DIAGNOSIS — Z00.00 ROUTINE GENERAL MEDICAL EXAMINATION AT A HEALTH CARE FACILITY: Primary | ICD-10-CM

## 2019-01-21 DIAGNOSIS — E11.42 TYPE 2 DIABETES MELLITUS WITH DIABETIC POLYNEUROPATHY, WITHOUT LONG-TERM CURRENT USE OF INSULIN (HCC): ICD-10-CM

## 2019-01-21 DIAGNOSIS — H66.92 ACUTE OTITIS MEDIA, LEFT: ICD-10-CM

## 2019-01-21 DIAGNOSIS — E66.01 MORBID OBESITY WITH BMI OF 50.0-59.9, ADULT (HCC): ICD-10-CM

## 2019-01-21 LAB — HBA1C MFR BLD: 8.1 %

## 2019-01-21 PROCEDURE — 83036 HEMOGLOBIN GLYCOSYLATED A1C: CPT | Performed by: FAMILY MEDICINE

## 2019-01-21 PROCEDURE — 20610 DRAIN/INJ JOINT/BURSA W/O US: CPT | Performed by: FAMILY MEDICINE

## 2019-01-21 PROCEDURE — 99395 PREV VISIT EST AGE 18-39: CPT | Performed by: FAMILY MEDICINE

## 2019-01-21 PROCEDURE — 99213 OFFICE O/P EST LOW 20 MIN: CPT | Performed by: FAMILY MEDICINE

## 2019-01-21 RX ORDER — NATEGLINIDE 120 MG/1
120 TABLET ORAL
Qty: 90 TABLET | Refills: 3 | Status: SHIPPED | OUTPATIENT
Start: 2019-01-21 | End: 2020-03-30

## 2019-01-21 RX ORDER — AMOXICILLIN AND CLAVULANATE POTASSIUM 875; 125 MG/1; MG/1
1 TABLET, FILM COATED ORAL 2 TIMES DAILY
Qty: 20 TABLET | Refills: 0 | Status: SHIPPED | OUTPATIENT
Start: 2019-01-21 | End: 2019-01-31

## 2019-01-21 ASSESSMENT — ENCOUNTER SYMPTOMS
COLOR CHANGE: 0
CONSTIPATION: 0
VOMITING: 0
COUGH: 0
CHEST TIGHTNESS: 0
SHORTNESS OF BREATH: 0
RHINORRHEA: 0
SORE THROAT: 0
WHEEZING: 0
EYE ITCHING: 0
NAUSEA: 0
DIARRHEA: 0
ABDOMINAL PAIN: 0

## 2019-02-04 ENCOUNTER — OFFICE VISIT (OUTPATIENT)
Dept: PRIMARY CARE CLINIC | Age: 39
End: 2019-02-04
Payer: COMMERCIAL

## 2019-02-04 VITALS
TEMPERATURE: 97 F | WEIGHT: 315 LBS | SYSTOLIC BLOOD PRESSURE: 110 MMHG | BODY MASS INDEX: 53.67 KG/M2 | DIASTOLIC BLOOD PRESSURE: 72 MMHG | HEART RATE: 81 BPM | RESPIRATION RATE: 20 BRPM | OXYGEN SATURATION: 98 %

## 2019-02-04 DIAGNOSIS — J20.9 ACUTE BRONCHITIS, UNSPECIFIED ORGANISM: Primary | ICD-10-CM

## 2019-02-04 PROCEDURE — 99213 OFFICE O/P EST LOW 20 MIN: CPT | Performed by: FAMILY MEDICINE

## 2019-02-04 RX ORDER — BENZONATATE 100 MG/1
100 CAPSULE ORAL 3 TIMES DAILY PRN
Qty: 20 CAPSULE | Refills: 0 | Status: SHIPPED | OUTPATIENT
Start: 2019-02-04 | End: 2019-02-11

## 2019-02-04 RX ORDER — AMOXICILLIN AND CLAVULANATE POTASSIUM 875; 125 MG/1; MG/1
1 TABLET, FILM COATED ORAL 2 TIMES DAILY
Qty: 20 TABLET | Refills: 0 | Status: SHIPPED | OUTPATIENT
Start: 2019-02-04 | End: 2019-02-14

## 2019-02-04 RX ORDER — PREDNISONE 20 MG/1
40 TABLET ORAL DAILY
Qty: 10 TABLET | Refills: 0 | Status: SHIPPED | OUTPATIENT
Start: 2019-02-04 | End: 2019-02-09

## 2019-02-04 ASSESSMENT — ENCOUNTER SYMPTOMS
SHORTNESS OF BREATH: 1
ABDOMINAL PAIN: 0
COUGH: 0
VOMITING: 0
DIARRHEA: 0
WHEEZING: 1
CHEST TIGHTNESS: 0
NAUSEA: 0
CONSTIPATION: 0

## 2019-08-02 ENCOUNTER — TELEPHONE (OUTPATIENT)
Dept: PRIMARY CARE CLINIC | Age: 39
End: 2019-08-02

## 2019-09-30 ENCOUNTER — OFFICE VISIT (OUTPATIENT)
Dept: PRIMARY CARE CLINIC | Age: 39
End: 2019-09-30
Payer: COMMERCIAL

## 2019-09-30 VITALS
WEIGHT: 315 LBS | DIASTOLIC BLOOD PRESSURE: 60 MMHG | HEART RATE: 68 BPM | RESPIRATION RATE: 20 BRPM | BODY MASS INDEX: 40.43 KG/M2 | HEIGHT: 74 IN | SYSTOLIC BLOOD PRESSURE: 120 MMHG

## 2019-09-30 DIAGNOSIS — M54.16 LUMBAR RADICULOPATHY: ICD-10-CM

## 2019-09-30 DIAGNOSIS — G89.29 CHRONIC LEFT HIP PAIN: ICD-10-CM

## 2019-09-30 DIAGNOSIS — M54.12 CERVICAL RADICULOPATHY: Primary | ICD-10-CM

## 2019-09-30 DIAGNOSIS — E66.01 MORBID OBESITY DUE TO EXCESS CALORIES (HCC): ICD-10-CM

## 2019-09-30 DIAGNOSIS — M25.552 CHRONIC LEFT HIP PAIN: ICD-10-CM

## 2019-09-30 DIAGNOSIS — R09.82 POSTNASAL DRIP: ICD-10-CM

## 2019-09-30 PROCEDURE — 99214 OFFICE O/P EST MOD 30 MIN: CPT | Performed by: FAMILY MEDICINE

## 2019-09-30 RX ORDER — GABAPENTIN 300 MG/1
300 CAPSULE ORAL 3 TIMES DAILY
Qty: 90 CAPSULE | Refills: 3 | Status: SHIPPED | OUTPATIENT
Start: 2019-09-30 | End: 2020-03-30

## 2019-09-30 RX ORDER — METHYLPREDNISOLONE 4 MG/1
TABLET ORAL
Qty: 1 KIT | Refills: 0 | Status: ON HOLD | OUTPATIENT
Start: 2019-09-30 | End: 2020-09-05 | Stop reason: HOSPADM

## 2019-09-30 RX ORDER — FLUTICASONE PROPIONATE 50 MCG
1 SPRAY, SUSPENSION (ML) NASAL DAILY
Qty: 1 BOTTLE | Refills: 2 | Status: SHIPPED | OUTPATIENT
Start: 2019-09-30 | End: 2022-09-26

## 2019-09-30 ASSESSMENT — ENCOUNTER SYMPTOMS
VOMITING: 0
SHORTNESS OF BREATH: 0
SORE THROAT: 1
WHEEZING: 0
ABDOMINAL PAIN: 0
BACK PAIN: 0
DIARRHEA: 0
CONSTIPATION: 0
NAUSEA: 0
COUGH: 0
CHEST TIGHTNESS: 0

## 2019-09-30 ASSESSMENT — PATIENT HEALTH QUESTIONNAIRE - PHQ9
2. FEELING DOWN, DEPRESSED OR HOPELESS: 0
1. LITTLE INTEREST OR PLEASURE IN DOING THINGS: 0
SUM OF ALL RESPONSES TO PHQ QUESTIONS 1-9: 0
SUM OF ALL RESPONSES TO PHQ QUESTIONS 1-9: 0
SUM OF ALL RESPONSES TO PHQ9 QUESTIONS 1 & 2: 0

## 2019-09-30 NOTE — PROGRESS NOTES
external ear and ear canal normal.   Left Ear: Hearing, tympanic membrane, external ear and ear canal normal.   Nose: Mucosal edema and rhinorrhea present. Mouth/Throat: Uvula is midline and mucous membranes are normal. Posterior oropharyngeal erythema present. No oropharyngeal exudate or posterior oropharyngeal edema. Cardiovascular: Normal rate, regular rhythm, normal heart sounds and intact distal pulses. Exam reveals no gallop and no friction rub. No murmur heard. Pulmonary/Chest: Effort normal and breath sounds normal. No respiratory distress. He has no wheezes. He has no rales. He exhibits no tenderness. Abdominal: Soft. Bowel sounds are normal. He exhibits no distension and no mass. There is no tenderness. There is no rebound and no guarding. Central obesity   Musculoskeletal:        Left hip: He exhibits decreased range of motion. He exhibits normal strength, no tenderness and no bony tenderness. Cervical back: He exhibits decreased range of motion, tenderness (R trap) and pain (+ R sided spurlings). Lumbar back: He exhibits normal range of motion and no tenderness. Right lower leg: He exhibits no edema. Left lower leg: He exhibits no edema. Neurological: He is alert and oriented to person, place, and time. Coordination and gait normal.   Skin: Skin is warm and dry. He is not diaphoretic. No cyanosis. Nails show no clubbing. Nursing note and vitals reviewed. Assessment:    ICD-10-CM    1. Cervical radiculopathy M54.12    2. Morbid obesity due to excess calories ([facility]) E66.01    3. Chronic left hip pain M25.552 External Referral To Orthopedic Surgery    G89.29    4. Lumbar radiculopathy M54.16    5. Postnasal drip R09.82        Plan:   Cervicalgia secondary to occupation, patient start stretching and anti-inflammatory. Left hip pain secondary to body habitus as well as possibly lumbar radiculopathy. He is to follow-up with orthopedic surgery for this. Postnasal drip likely cause of chronic sore throat, reassurance about thyroid cartilage  Orders Placed This Encounter   Procedures    External Referral To Orthopedic Surgery     Referral Priority:   Routine     Referral Type:   Eval and Treat     Referral Reason:   Specialty Services Required     Requested Specialty:   Orthopedic Surgery     Number of Visits Requested:   1     Orders Placed This Encounter   Medications    gabapentin (NEURONTIN) 300 MG capsule     Sig: Take 1 capsule by mouth 3 times daily. Dispense:  90 capsule     Refill:  3    fluticasone (FLONASE) 50 MCG/ACT nasal spray     Si spray by Each Nostril route daily     Dispense:  1 Bottle     Refill:  2    methylPREDNISolone (MEDROL, ANDREY,) 4 MG tablet     Sig: Take by mouth. Dispense:  1 kit     Refill:  0    diclofenac (VOLTAREN) 50 MG EC tablet     Sig: Take 1 tablet by mouth 3 times daily (with meals)     Dispense:  90 tablet     Refill:  1        Medications Discontinued During This Encounter   Medication Reason    Naproxen Sodium (ALEVE) 220 MG CAPS     ibuprofen (ADVIL;MOTRIN) 400 MG tablet     gabapentin (NEURONTIN) 300 MG capsule REORDER     Patient Instructions   Start taking diclofenac three times a day for 2 weeks then as needed thereafter. Use tylenol 1000 mg three times a day. Patient Education        Neck: Exercises  Introduction  Here are some examples of exercises for you to try. The exercises may be suggested for a condition or for rehabilitation. Start each exercise slowly. Ease off the exercises if you start to have pain. You will be told when to start these exercises and which ones will work best for you. How to do the exercises  Neck stretch    1. This stretch works best if you keep your shoulder down as you lean away from it. To help you remember to do this, start by relaxing your shoulders and lightly holding on to your thighs or your chair.   2. Tilt your head toward your shoulder and hold for 15 to

## 2020-03-30 RX ORDER — NATEGLINIDE 120 MG/1
TABLET ORAL
Qty: 90 TABLET | Refills: 0 | Status: SHIPPED | OUTPATIENT
Start: 2020-03-30 | End: 2020-07-02 | Stop reason: SDUPTHER

## 2020-03-30 RX ORDER — GABAPENTIN 300 MG/1
CAPSULE ORAL
Qty: 90 CAPSULE | Refills: 0 | OUTPATIENT
Start: 2020-03-30 | End: 2020-07-02 | Stop reason: SDUPTHER

## 2020-06-25 ENCOUNTER — TELEPHONE (OUTPATIENT)
Dept: PRIMARY CARE CLINIC | Age: 40
End: 2020-06-25

## 2020-07-02 ENCOUNTER — VIRTUAL VISIT (OUTPATIENT)
Dept: PRIMARY CARE CLINIC | Age: 40
End: 2020-07-02
Payer: COMMERCIAL

## 2020-07-02 PROCEDURE — 99214 OFFICE O/P EST MOD 30 MIN: CPT | Performed by: FAMILY MEDICINE

## 2020-07-02 RX ORDER — CETIRIZINE HYDROCHLORIDE 10 MG/1
10 CAPSULE, LIQUID FILLED ORAL NIGHTLY
Qty: 90 CAPSULE | Refills: 1 | Status: SHIPPED | OUTPATIENT
Start: 2020-07-02 | End: 2021-06-07

## 2020-07-02 RX ORDER — GABAPENTIN 300 MG/1
300 CAPSULE ORAL 3 TIMES DAILY
Qty: 90 CAPSULE | Refills: 5 | Status: SHIPPED | OUTPATIENT
Start: 2020-07-02 | End: 2021-02-08

## 2020-07-02 RX ORDER — ASPIRIN 81 MG/1
81 TABLET ORAL DAILY
Qty: 90 TABLET | Refills: 3 | Status: SHIPPED | OUTPATIENT
Start: 2020-07-02

## 2020-07-02 RX ORDER — AMOXICILLIN AND CLAVULANATE POTASSIUM 875; 125 MG/1; MG/1
1 TABLET, FILM COATED ORAL 2 TIMES DAILY
Qty: 20 TABLET | Refills: 0 | Status: SHIPPED | OUTPATIENT
Start: 2020-07-02 | End: 2020-07-12

## 2020-07-02 RX ORDER — NATEGLINIDE 120 MG/1
120 TABLET ORAL
Qty: 270 TABLET | Refills: 3 | Status: SHIPPED | OUTPATIENT
Start: 2020-07-02 | End: 2021-08-13

## 2020-07-02 ASSESSMENT — ENCOUNTER SYMPTOMS
SHORTNESS OF BREATH: 0
ABDOMINAL PAIN: 0
VOMITING: 0
COUGH: 0
NAUSEA: 0
DIARRHEA: 0
CONSTIPATION: 0
CHEST TIGHTNESS: 0
WHEEZING: 0

## 2020-07-02 NOTE — PROGRESS NOTES
2020    TELEHEALTH EVALUATION -- Audio/Visual (During [facility] public health emergency)    HPI:    Dorothea Palumbo (:  1980) has requested an audio/video evaluation for the following concern(s):    Patient presents today via video visit for f/u blood sugar. He had recent DOT physical.  Had a urine test and had some sugar in the urine. He has been taking blood sugar med once per day. His a1c has been controlled. He notes he has had some increase glycosuria he believes over the last few months. He has doubled his med to twice per day and that has had stopped the frequent urination and thirsty feeling. He has a f/u urine test.      Has been battling a cough as well and having allergy issues    Review of Systems   Constitutional: Negative for chills and fever. Respiratory: Negative for cough, chest tightness, shortness of breath and wheezing. Cardiovascular: Negative for chest pain, palpitations and leg swelling. Gastrointestinal: Negative for abdominal pain, constipation, diarrhea, nausea and vomiting. Genitourinary: Negative for difficulty urinating, dysuria and frequency. Prior to Visit Medications    Medication Sig Taking? Authorizing Provider   amoxicillin-clavulanate (AUGMENTIN) 875-125 MG per tablet Take 1 tablet by mouth 2 times daily for 10 days Yes Summer Rivas MD   Cetirizine HCl (ZYRTEC ALLERGY) 10 MG CAPS Take 10 mg by mouth nightly Yes Summer Rivas MD   nateglinide (STARLIX) 120 MG tablet Take 1 tablet by mouth 3 times daily (before meals) Yes Summer Rivas MD   gabapentin (NEURONTIN) 300 MG capsule Take 1 capsule by mouth 3 times daily for 30 days.  Yes Summer Rivas MD   metFORMIN (GLUCOPHAGE) 1000 MG tablet Take 1 tablet by mouth 2 times daily (with meals) Yes Summer Rivas MD   diclofenac (VOLTAREN) 50 MG EC tablet TAKE 1 TABLET BY MOUTH THREE TIMES DAILY WITH MEALS Yes Summer Rivas MD   aspirin EC 81 MG EC tablet Take 1 tablet by mouth daily Yes Summer Rivas MD   fluticasone (FLONASE) 50 MCG/ACT nasal spray 1 spray by Each Nostril route daily  Pineda Gann MD   methylPREDNISolone (MEDROL, ANDREY,) 4 MG tablet Take by mouth.   Pineda Gann MD       Social History     Tobacco Use    Smoking status: Current Every Day Smoker     Packs/day: 1.00     Years: 20.00     Pack years: 20.00     Types: Cigarettes    Smokeless tobacco: Never Used   Substance Use Topics    Alcohol use: Yes     Comment: occ    Drug use: No        No Known Allergies,   Past Medical History:   Diagnosis Date    Degenerative disc disease at L5-S1 level     Hyperlipidemia     Nerve pain     Type 2 diabetes mellitus without complication ([facility]    ,   Past Surgical History:   Procedure Laterality Date    INCISION AND DRAINAGE Right 7/7/2016    KNEE IRRIGATION AND DEBRIDEMENT  performed by Jocelin Lewis MD at [address] [place] [address] Way Right 5/31/2016    PERONEAL NERVE RELEASE ; RIGHT FIBULAR HEAD  performed by Jocelin Lewis MD at [facility] OR   ,   Social History     Tobacco Use    Smoking status: Current Every Day Smoker     Packs/day: 1.00     Years: 20.00     Pack years: 20.00     Types: Cigarettes    Smokeless tobacco: Never Used   Substance Use Topics    Alcohol use: Yes     Comment: occ    Drug use: No   ,   Family History   Problem Relation Age of Onset    Other Mother         blockage    High Blood Pressure Father     Diabetes Father    ,   Immunization History   Administered Date(s) Administered    Pneumococcal Conjugate 13-valent (Jan Nickie) 08/22/2016    Tdap (Boostrix, Adacel) 08/22/2016   ,   Health Maintenance   Topic Date Due    Diabetic retinal exam  08/26/1989    Hepatitis B vaccine (1 of 3 - Risk 3-dose series) 08/26/1998    Pneumococcal 0-64 years Vaccine (1 of 1 - PPSV23) 10/17/2016    Lipid screen  08/29/2017    Diabetic microalbuminuria test  10/24/2017    Diabetic foot exam  09/25/2018    A1C test (Diabetic or Prediabetic)  01/21/2020    Flu vaccine (1) Lipid Panel   2. Class 3 severe obesity due to excess calories with serious comorbidity and body mass index (BMI) of 50.0 to 59.9 in adult ([facility] E66.01     Z68.43    3. Glucosuria R81    4. Cervical radiculopathy M54.12 gabapentin (NEURONTIN) 300 MG capsule   5. Allergic rhinitis due to pollen, unspecified seasonality J30.1    6. Acute bacterial sinusitis J01.90     B96.89        Glucosuria from DM, recheck a1c, pt already doubled his meds which is what I had down for him to do and this should help. May need a third med. Advised dec sugary drinks. Pt to come by for pneumovax 23 in office soon    Orders Placed This Encounter   Medications    amoxicillin-clavulanate (AUGMENTIN) 875-125 MG per tablet     Sig: Take 1 tablet by mouth 2 times daily for 10 days     Dispense:  20 tablet     Refill:  0    Cetirizine HCl (ZYRTEC ALLERGY) 10 MG CAPS     Sig: Take 10 mg by mouth nightly     Dispense:  90 capsule     Refill:  1    nateglinide (STARLIX) 120 MG tablet     Sig: Take 1 tablet by mouth 3 times daily (before meals)     Dispense:  270 tablet     Refill:  3    gabapentin (NEURONTIN) 300 MG capsule     Sig: Take 1 capsule by mouth 3 times daily for 30 days.      Dispense:  90 capsule     Refill:  5    metFORMIN (GLUCOPHAGE) 1000 MG tablet     Sig: Take 1 tablet by mouth 2 times daily (with meals)     Dispense:  180 tablet     Refill:  3    diclofenac (VOLTAREN) 50 MG EC tablet     Sig: TAKE 1 TABLET BY MOUTH THREE TIMES DAILY WITH MEALS     Dispense:  90 tablet     Refill:  3    aspirin EC 81 MG EC tablet     Sig: Take 1 tablet by mouth daily     Dispense:  90 tablet     Refill:  3       Orders Placed This Encounter   Procedures    Hemoglobin A1C     Standing Status:   Standing     Number of Occurrences:   40     Standing Expiration Date:   6/15/2031    Microalbumin / Creatinine Urine Ratio     Standing Status:   Standing     Number of Occurrences:   15     Standing Expiration Date:   6/15/2031   [facility] Comprehensive Metabolic Panel     Every 11 months     Standing Status:   Standing     Number of Occurrences:   15     Standing Expiration Date:   6/15/2031    Lipid Panel     Standing Status:   Standing     Number of Occurrences:   15     Standing Expiration Date:   6/15/2031     Order Specific Question:   Is Patient Fasting?/# of Hours     Answer:   yes/8 hrs       Return in about 2 months (around 9/2/2020) for poct a1c (OV M-W am). Martin Leon is a 35 y.o. male being evaluated by a Virtual Visit (video visit) encounter to address concerns as mentioned above. A caregiver was present when appropriate. Due to this being a TeleHealth encounter (During [facility]-88 public health emergency), evaluation of the following organ systems was limited: Vitals/Constitutional/EENT/Resp/CV/GI//MS/Neuro/Skin/Heme-Lymph-Imm. Pursuant to the emergency declaration under the [address] [place] [address], [address] [place] [address] and the [employer] Act, this Virtual Visit was conducted with patient's (and/or legal guardian's) consent, to reduce the patient's risk of exposure to COVID-19 and provide necessary medical care. The patient (and/or legal guardian) has also been advised to contact this office for worsening conditions or problems, and seek emergency medical treatment and/or call 911 if deemed necessary. Services were provided through a video synchronous discussion virtually to substitute for in-person clinic visit. Patient and provider were located at their individual homes or provider at secure site for business. --Dany Smith MD on 7/2/2020 at 10:11 AM    An electronic signature was used to authenticate this note.

## 2020-07-06 ENCOUNTER — OFFICE VISIT (OUTPATIENT)
Dept: PRIMARY CARE CLINIC | Age: 40
End: 2020-07-06
Payer: COMMERCIAL

## 2020-07-06 DIAGNOSIS — E11.42 TYPE 2 DIABETES MELLITUS WITH DIABETIC POLYNEUROPATHY, WITHOUT LONG-TERM CURRENT USE OF INSULIN (HCC): ICD-10-CM

## 2020-07-06 LAB
ALBUMIN SERPL-MCNC: 4.4 G/DL (ref 3.5–5.2)
ALP BLD-CCNC: 60 U/L (ref 40–130)
ALT SERPL-CCNC: 63 U/L (ref 5–41)
ANION GAP SERPL CALCULATED.3IONS-SCNC: 14 MMOL/L (ref 7–19)
AST SERPL-CCNC: 45 U/L (ref 5–40)
BILIRUB SERPL-MCNC: 0.5 MG/DL (ref 0.2–1.2)
BUN BLDV-MCNC: 10 MG/DL (ref 6–20)
CALCIUM SERPL-MCNC: 9.4 MG/DL (ref 8.6–10)
CHLORIDE BLD-SCNC: 100 MMOL/L (ref 98–111)
CHOLESTEROL, TOTAL: 185 MG/DL (ref 160–199)
CO2: 25 MMOL/L (ref 22–29)
CREAT SERPL-MCNC: 0.7 MG/DL (ref 0.5–1.2)
CREATININE URINE: 177.1 MG/DL (ref 4.2–622)
GFR NON-AFRICAN AMERICAN: >60
GLUCOSE BLD-MCNC: 184 MG/DL (ref 74–109)
HBA1C MFR BLD: 8.8 % (ref 4–6)
HDLC SERPL-MCNC: 36 MG/DL (ref 55–121)
LDL CHOLESTEROL CALCULATED: 115 MG/DL
MICROALBUMIN UR-MCNC: 2.9 MG/DL (ref 0–19)
MICROALBUMIN/CREAT UR-RTO: 16.4 MG/G
POTASSIUM SERPL-SCNC: 4.5 MMOL/L (ref 3.5–5)
SODIUM BLD-SCNC: 139 MMOL/L (ref 136–145)
TOTAL PROTEIN: 7.1 G/DL (ref 6.6–8.7)
TRIGL SERPL-MCNC: 170 MG/DL (ref 0–149)

## 2020-07-07 PROCEDURE — 90471 IMMUNIZATION ADMIN: CPT | Performed by: FAMILY MEDICINE

## 2020-07-07 PROCEDURE — 90732 PPSV23 VACC 2 YRS+ SUBQ/IM: CPT | Performed by: FAMILY MEDICINE

## 2020-07-17 ENCOUNTER — TELEPHONE (OUTPATIENT)
Dept: PRIMARY CARE CLINIC | Age: 40
End: 2020-07-17

## 2020-07-17 RX ORDER — GLIPIZIDE 5 MG/1
5 TABLET, FILM COATED, EXTENDED RELEASE ORAL
Qty: 30 TABLET | Refills: 1 | Status: SHIPPED | OUTPATIENT
Start: 2020-07-17 | End: 2020-09-27

## 2020-07-17 NOTE — TELEPHONE ENCOUNTER
----- Message from Jessie Mosqueda MD sent at 7/16/2020  6:40 AM CDT -----  [facility] message sent to patient about results and plan. Can we also please call the patient to let them know? Thank you! Diabetes is uncontrolled at 8.8. Even with resuming the medication properly as he is done recently; he likely needs to be on additional medicine as we need to get his A1c less than 7.   I would recommend glipizide extended release 5 mg once daily in the morning with breakfast.

## 2020-07-17 NOTE — TELEPHONE ENCOUNTER
Called patient with results. The patient had a clear understanding and had no questions or concerns at this time.         Requested Prescriptions     Signed Prescriptions Disp Refills    glipiZIDE (GLUCOTROL XL) 5 MG extended release tablet 30 tablet 1     Sig: Take 1 tablet by mouth daily (with breakfast)     Authorizing Provider: Brandon Kasper     Ordering User: Belen Gimenez MA

## 2020-08-31 ENCOUNTER — HOSPITAL ENCOUNTER (INPATIENT)
Age: 40
LOS: 5 days | Discharge: HOME | DRG: 872 | End: 2020-09-05
Attending: HOSPITALIST | Admitting: HOSPITALIST
Payer: COMMERCIAL

## 2020-08-31 ENCOUNTER — APPOINTMENT (OUTPATIENT)
Dept: CT IMAGING | Age: 40
DRG: 872 | End: 2020-08-31
Payer: COMMERCIAL

## 2020-08-31 ENCOUNTER — OFFICE VISIT (OUTPATIENT)
Dept: URGENT CARE | Age: 40
End: 2020-08-31
Payer: COMMERCIAL

## 2020-08-31 VITALS
SYSTOLIC BLOOD PRESSURE: 142 MMHG | HEART RATE: 100 BPM | HEIGHT: 74 IN | BODY MASS INDEX: 40.43 KG/M2 | DIASTOLIC BLOOD PRESSURE: 78 MMHG | WEIGHT: 315 LBS | RESPIRATION RATE: 20 BRPM | OXYGEN SATURATION: 98 % | TEMPERATURE: 98.8 F

## 2020-08-31 PROBLEM — E11.65 UNCONTROLLED TYPE 2 DIABETES MELLITUS WITH HYPERGLYCEMIA (HCC): Status: ACTIVE | Noted: 2020-08-31

## 2020-08-31 PROBLEM — Z91.199 PERSONAL HISTORY OF NONCOMPLIANCE WITH MEDICAL TREATMENT AND REGIMEN: Status: ACTIVE | Noted: 2020-08-31

## 2020-08-31 PROBLEM — K61.0 PERIANAL ABSCESS: Status: ACTIVE | Noted: 2020-08-31

## 2020-08-31 PROBLEM — A41.9 SEPSIS (HCC): Status: ACTIVE | Noted: 2020-08-31

## 2020-08-31 PROBLEM — F17.219 CIGARETTE NICOTINE DEPENDENCE WITH NICOTINE-INDUCED DISORDER: Status: ACTIVE | Noted: 2020-08-31

## 2020-08-31 PROBLEM — Z71.6 TOBACCO ABUSE COUNSELING: Status: ACTIVE | Noted: 2020-08-31

## 2020-08-31 PROBLEM — L02.31 ABSCESS, GLUTEAL, LEFT: Status: ACTIVE | Noted: 2020-08-31

## 2020-08-31 LAB
ALBUMIN SERPL-MCNC: 3.6 G/DL (ref 3.5–5.2)
ALP BLD-CCNC: 71 U/L (ref 40–130)
ALT SERPL-CCNC: 41 U/L (ref 5–41)
ANION GAP SERPL CALCULATED.3IONS-SCNC: 15 MMOL/L (ref 7–19)
AST SERPL-CCNC: 23 U/L (ref 5–40)
BASOPHILS ABSOLUTE: 0 K/UL (ref 0–0.2)
BASOPHILS RELATIVE PERCENT: 0.1 % (ref 0–1)
BILIRUB SERPL-MCNC: 0.5 MG/DL (ref 0.2–1.2)
BUN BLDV-MCNC: 9 MG/DL (ref 6–20)
CALCIUM SERPL-MCNC: 9.5 MG/DL (ref 8.6–10)
CHLORIDE BLD-SCNC: 101 MMOL/L (ref 98–111)
CHP ED QC CHECK: ABNORMAL
CO2: 25 MMOL/L (ref 22–29)
CREAT SERPL-MCNC: 0.7 MG/DL (ref 0.5–1.2)
EOSINOPHILS ABSOLUTE: 0 K/UL (ref 0–0.6)
EOSINOPHILS RELATIVE PERCENT: 0.2 % (ref 0–5)
GFR AFRICAN AMERICAN: >59
GFR NON-AFRICAN AMERICAN: >60
GLUCOSE BLD-MCNC: 332 MG/DL (ref 74–109)
GLUCOSE BLD-MCNC: 334 MG/DL
HCT VFR BLD CALC: 40.6 % (ref 42–52)
HEMOGLOBIN: 13.3 G/DL (ref 14–18)
IMMATURE GRANULOCYTES #: 0.1 K/UL
LACTIC ACID, SEPSIS: 3.4 MG/DL (ref 0.5–1.9)
LYMPHOCYTES ABSOLUTE: 1.3 K/UL (ref 1.1–4.5)
LYMPHOCYTES RELATIVE PERCENT: 9.1 % (ref 20–40)
MCH RBC QN AUTO: 29.4 PG (ref 27–31)
MCHC RBC AUTO-ENTMCNC: 32.8 G/DL (ref 33–37)
MCV RBC AUTO: 89.8 FL (ref 80–94)
MONOCYTES ABSOLUTE: 0.9 K/UL (ref 0–0.9)
MONOCYTES RELATIVE PERCENT: 6.3 % (ref 0–10)
NEUTROPHILS ABSOLUTE: 12.2 K/UL (ref 1.5–7.5)
NEUTROPHILS RELATIVE PERCENT: 83.5 % (ref 50–65)
PDW BLD-RTO: 12.9 % (ref 11.5–14.5)
PLATELET # BLD: 303 K/UL (ref 130–400)
PMV BLD AUTO: 10.1 FL (ref 9.4–12.4)
POTASSIUM REFLEX MAGNESIUM: 4.2 MMOL/L (ref 3.5–5)
RBC # BLD: 4.52 M/UL (ref 4.7–6.1)
SODIUM BLD-SCNC: 141 MMOL/L (ref 136–145)
TOTAL PROTEIN: 7.5 G/DL (ref 6.6–8.7)
WBC # BLD: 14.6 K/UL (ref 4.8–10.8)

## 2020-08-31 PROCEDURE — 96375 TX/PRO/DX INJ NEW DRUG ADDON: CPT

## 2020-08-31 PROCEDURE — 87186 SC STD MICRODIL/AGAR DIL: CPT

## 2020-08-31 PROCEDURE — 96365 THER/PROPH/DIAG IV INF INIT: CPT

## 2020-08-31 PROCEDURE — 74177 CT ABD & PELVIS W/CONTRAST: CPT

## 2020-08-31 PROCEDURE — 85025 COMPLETE CBC W/AUTO DIFF WBC: CPT

## 2020-08-31 PROCEDURE — 87040 BLOOD CULTURE FOR BACTERIA: CPT

## 2020-08-31 PROCEDURE — 87205 SMEAR GRAM STAIN: CPT

## 2020-08-31 PROCEDURE — 6370000000 HC RX 637 (ALT 250 FOR IP): Performed by: HOSPITALIST

## 2020-08-31 PROCEDURE — 87075 CULTR BACTERIA EXCEPT BLOOD: CPT

## 2020-08-31 PROCEDURE — 2580000003 HC RX 258: Performed by: HOSPITALIST

## 2020-08-31 PROCEDURE — 87070 CULTURE OTHR SPECIMN AEROBIC: CPT

## 2020-08-31 PROCEDURE — 83605 ASSAY OF LACTIC ACID: CPT

## 2020-08-31 PROCEDURE — 80053 COMPREHEN METABOLIC PANEL: CPT

## 2020-08-31 PROCEDURE — 1210000000 HC MED SURG R&B

## 2020-08-31 PROCEDURE — 99282 EMERGENCY DEPT VISIT SF MDM: CPT

## 2020-08-31 PROCEDURE — 36415 COLL VENOUS BLD VENIPUNCTURE: CPT

## 2020-08-31 PROCEDURE — 2580000003 HC RX 258: Performed by: PHYSICIAN ASSISTANT

## 2020-08-31 PROCEDURE — 10060 I&D ABSCESS SIMPLE/SINGLE: CPT

## 2020-08-31 PROCEDURE — 82962 GLUCOSE BLOOD TEST: CPT | Performed by: NURSE PRACTITIONER

## 2020-08-31 PROCEDURE — 6360000004 HC RX CONTRAST MEDICATION: Performed by: PHYSICIAN ASSISTANT

## 2020-08-31 PROCEDURE — 6360000002 HC RX W HCPCS: Performed by: PHYSICIAN ASSISTANT

## 2020-08-31 PROCEDURE — 99213 OFFICE O/P EST LOW 20 MIN: CPT | Performed by: NURSE PRACTITIONER

## 2020-08-31 RX ORDER — POLYETHYLENE GLYCOL 3350 17 G/17G
17 POWDER, FOR SOLUTION ORAL DAILY PRN
Status: DISCONTINUED | OUTPATIENT
Start: 2020-08-31 | End: 2020-09-05 | Stop reason: HOSPADM

## 2020-08-31 RX ORDER — SODIUM CHLORIDE 0.9 % (FLUSH) 0.9 %
10 SYRINGE (ML) INJECTION EVERY 12 HOURS SCHEDULED
Status: DISCONTINUED | OUTPATIENT
Start: 2020-08-31 | End: 2020-09-05 | Stop reason: HOSPADM

## 2020-08-31 RX ORDER — FLUTICASONE PROPIONATE 50 MCG
1 SPRAY, SUSPENSION (ML) NASAL DAILY
Status: DISCONTINUED | OUTPATIENT
Start: 2020-09-01 | End: 2020-09-05 | Stop reason: HOSPADM

## 2020-08-31 RX ORDER — ONDANSETRON 2 MG/ML
4 INJECTION INTRAMUSCULAR; INTRAVENOUS EVERY 6 HOURS PRN
Status: DISCONTINUED | OUTPATIENT
Start: 2020-08-31 | End: 2020-09-05 | Stop reason: HOSPADM

## 2020-08-31 RX ORDER — SODIUM CHLORIDE 0.9 % (FLUSH) 0.9 %
10 SYRINGE (ML) INJECTION PRN
Status: DISCONTINUED | OUTPATIENT
Start: 2020-08-31 | End: 2020-09-05 | Stop reason: HOSPADM

## 2020-08-31 RX ORDER — POTASSIUM CHLORIDE 20 MEQ/1
40 TABLET, EXTENDED RELEASE ORAL PRN
Status: DISCONTINUED | OUTPATIENT
Start: 2020-08-31 | End: 2020-09-05 | Stop reason: HOSPADM

## 2020-08-31 RX ORDER — POTASSIUM CHLORIDE 7.45 MG/ML
10 INJECTION INTRAVENOUS PRN
Status: DISCONTINUED | OUTPATIENT
Start: 2020-08-31 | End: 2020-09-05 | Stop reason: HOSPADM

## 2020-08-31 RX ORDER — CETIRIZINE HYDROCHLORIDE 10 MG/1
10 TABLET ORAL NIGHTLY
Status: DISCONTINUED | OUTPATIENT
Start: 2020-08-31 | End: 2020-09-05 | Stop reason: HOSPADM

## 2020-08-31 RX ORDER — GABAPENTIN 300 MG/1
300 CAPSULE ORAL 3 TIMES DAILY
Status: DISCONTINUED | OUTPATIENT
Start: 2020-08-31 | End: 2020-09-05 | Stop reason: HOSPADM

## 2020-08-31 RX ORDER — ASPIRIN 81 MG/1
81 TABLET ORAL DAILY
Status: DISCONTINUED | OUTPATIENT
Start: 2020-08-31 | End: 2020-09-05 | Stop reason: HOSPADM

## 2020-08-31 RX ORDER — SODIUM CHLORIDE, SODIUM LACTATE, POTASSIUM CHLORIDE, CALCIUM CHLORIDE 600; 310; 30; 20 MG/100ML; MG/100ML; MG/100ML; MG/100ML
1000 INJECTION, SOLUTION INTRAVENOUS ONCE
Status: DISCONTINUED | OUTPATIENT
Start: 2020-08-31 | End: 2020-09-05 | Stop reason: HOSPADM

## 2020-08-31 RX ORDER — GLIPIZIDE 10 MG/1
10 TABLET ORAL
Status: DISCONTINUED | OUTPATIENT
Start: 2020-09-01 | End: 2020-09-05 | Stop reason: HOSPADM

## 2020-08-31 RX ORDER — REPAGLINIDE 0.5 MG/1
1 TABLET ORAL
Status: DISCONTINUED | OUTPATIENT
Start: 2020-09-01 | End: 2020-09-05 | Stop reason: HOSPADM

## 2020-08-31 RX ORDER — ACETAMINOPHEN 650 MG/1
650 SUPPOSITORY RECTAL EVERY 6 HOURS PRN
Status: DISCONTINUED | OUTPATIENT
Start: 2020-08-31 | End: 2020-09-05 | Stop reason: HOSPADM

## 2020-08-31 RX ORDER — PROMETHAZINE HYDROCHLORIDE 12.5 MG/1
12.5 TABLET ORAL EVERY 6 HOURS PRN
Status: DISCONTINUED | OUTPATIENT
Start: 2020-08-31 | End: 2020-09-05 | Stop reason: HOSPADM

## 2020-08-31 RX ORDER — ACETAMINOPHEN 325 MG/1
650 TABLET ORAL EVERY 6 HOURS PRN
Status: DISCONTINUED | OUTPATIENT
Start: 2020-08-31 | End: 2020-09-05 | Stop reason: HOSPADM

## 2020-08-31 RX ORDER — MAGNESIUM SULFATE 1 G/100ML
1 INJECTION INTRAVENOUS PRN
Status: DISCONTINUED | OUTPATIENT
Start: 2020-08-31 | End: 2020-09-05 | Stop reason: HOSPADM

## 2020-08-31 RX ORDER — FAMOTIDINE 20 MG/1
20 TABLET, FILM COATED ORAL 2 TIMES DAILY
Status: DISCONTINUED | OUTPATIENT
Start: 2020-08-31 | End: 2020-09-05 | Stop reason: HOSPADM

## 2020-08-31 RX ORDER — MORPHINE SULFATE 4 MG/ML
4 INJECTION, SOLUTION INTRAMUSCULAR; INTRAVENOUS ONCE
Status: COMPLETED | OUTPATIENT
Start: 2020-08-31 | End: 2020-08-31

## 2020-08-31 RX ADMIN — CETIRIZINE HYDROCHLORIDE 10 MG: 10 TABLET, FILM COATED ORAL at 20:49

## 2020-08-31 RX ADMIN — MORPHINE SULFATE 4 MG: 4 INJECTION, SOLUTION INTRAMUSCULAR; INTRAVENOUS at 17:52

## 2020-08-31 RX ADMIN — SODIUM CHLORIDE, PRESERVATIVE FREE 10 ML: 5 INJECTION INTRAVENOUS at 20:50

## 2020-08-31 RX ADMIN — GABAPENTIN 300 MG: 300 CAPSULE ORAL at 20:50

## 2020-08-31 RX ADMIN — FAMOTIDINE 20 MG: 20 TABLET ORAL at 20:49

## 2020-08-31 RX ADMIN — ASPIRIN 81 MG: 81 TABLET, COATED ORAL at 20:53

## 2020-08-31 RX ADMIN — VANCOMYCIN HYDROCHLORIDE 1500 MG: 10 INJECTION, POWDER, LYOPHILIZED, FOR SOLUTION INTRAVENOUS at 16:55

## 2020-08-31 RX ADMIN — IOPAMIDOL 90 ML: 755 INJECTION, SOLUTION INTRAVENOUS at 15:17

## 2020-08-31 RX ADMIN — METFORMIN HYDROCHLORIDE 1000 MG: 500 TABLET ORAL at 20:53

## 2020-08-31 RX ADMIN — CEFTRIAXONE 1 G: 1 INJECTION, POWDER, FOR SOLUTION INTRAMUSCULAR; INTRAVENOUS at 16:54

## 2020-08-31 RX ADMIN — ACETAMINOPHEN 650 MG: 325 TABLET, FILM COATED ORAL at 20:50

## 2020-08-31 ASSESSMENT — ENCOUNTER SYMPTOMS
NAUSEA: 0
COUGH: 0
PHOTOPHOBIA: 0
BACK PAIN: 0
SHORTNESS OF BREATH: 0
DIARRHEA: 1
SORE THROAT: 0
CHEST TIGHTNESS: 0
EYE DISCHARGE: 0
EYE ITCHING: 0
COLOR CHANGE: 0
COLOR CHANGE: 1
CONSTIPATION: 0
APNEA: 0
EYES NEGATIVE: 1
WHEEZING: 0
VOMITING: 0
SHORTNESS OF BREATH: 0

## 2020-08-31 ASSESSMENT — PAIN SCALES - GENERAL
PAINLEVEL_OUTOF10: 7
PAINLEVEL_OUTOF10: 6
PAINLEVEL_OUTOF10: 6

## 2020-08-31 NOTE — H&P
[place], [place], [address]        DEPARTMENT OF HOSPITALIST MEDICINE        HISTORY & PHYSICAL:          REASON FOR ADMISSION:  Chief Complaint   Patient presents with    Wound Infection     in gluteal fold        HISTORY OF PRESENT ILLNESS:  Judith gO is an 38 y.o. male. He is unfortunate young gentleman with type 2 diabetes mellitus which is uncontrolled and morbid obesity who is complaining of pain in his left buttock for the last 4 to 5 days which is now getting worse. He also feels swelling in his left buttock as well which was painful to touch and fluctuant. He went to his PCPs office today and was sent to the ER for evaluation. Patient was diagnosed with left gluteal abscess, general surgery was consulted who advised the ER to carmine the abscess after giving IV antibiotics, which was done. Patient had copious amount of purulent discharge which was sent for culture. He was given IV antibiotics in the form of Rocephin and vancomycin. His lactic acid was 3.4 hence he got 2 L of IV fluids with septic protocol based on his ideal body weight. He is being admitted for medical management, IV antibiotics, ID and surgery evaluation and treatment and control of his diabetes mellitus.     PAST MEDICAL HISTORY:  Past Medical History:   Diagnosis Date    Degenerative disc disease at L5-S1 level     Hyperlipidemia     Nerve pain     Type 2 diabetes mellitus without complication ([facility] [address]          PAST SURGICAL HISTORY:  Past Surgical History:   Procedure Laterality Date    INCISION AND DRAINAGE Right 7/7/2016    KNEE IRRIGATION AND DEBRIDEMENT  performed by Bart Downey MD at [address] Right 5/31/2016    PERONEAL NERVE RELEASE ; RIGHT FIBULAR HEAD  performed by Bart Downey MD at [facility] OR        SOCIAL HISTORY:  Social History     Socioeconomic History    Marital status:      Spouse name: None    Number of children: None    Years of education: None    Highest education level: None   Occupational History    None   Social Needs    Financial resource strain: None    Food insecurity     Worry: None     Inability: None    Transportation needs     Medical: None     Non-medical: None   Tobacco Use    Smoking status: Current Every Day Smoker     Packs/day: 1.00     Years: 20.00     Pack years: 20.00     Types: Cigarettes    Smokeless tobacco: Never Used   Substance and Sexual Activity    Alcohol use: Yes     Comment: occ    Drug use: No    Sexual activity: None   Lifestyle    Physical activity     Days per week: None     Minutes per session: None    Stress: None   Relationships    Social connections     Talks on phone: None     Gets together: None     Attends Jainism service: None     Active member of club or organization: None     Attends meetings of clubs or organizations: None     Relationship status: None    Intimate partner violence     Fear of current or ex partner: None     Emotionally abused: None     Physically abused: None     Forced sexual activity: None   Other Topics Concern    None   Social History Narrative    None        FAMILY HISTORY:  Family History   Problem Relation Age of Onset    Other Mother         blockage    High Blood Pressure Father     Diabetes Father          ALLERGIES:  No Known Allergies     PRIOR TO ADMISSION MEDS:  Not in a hospital admission.      REVIEW OF SYSTEMS:  Constitutional:  + Low-grade fever, chills, nausea, vomiting, + tiredness and fatigue   Head:  No head injury, facial trauma   Eyes:  No acute visual changes, exudate, trauma   Ears:  No acute hearing loss, earaches   Nose: No nasal discharge, epistaxis   Neck: No new hoarseness, voice change, or new masses   Lungs:   No hemoptysis, pleurisy   Heart:  No chest pressure with exertion, palpitations,    Abdomen:   No new masses, + fluctuating swelling over left buttock   Extremities: +acute pain while ambulating due to swelling over left buttock, no new lesions Skin: + Redness and swelling over left buttock, no cyanosis   Neurologic: No new motor or sensory changes       PHYSICAL EXAM:  /64   Pulse 93   Temp 97.1 °F (36.2 °C)   Resp 20   Ht 6' 1\" (1.854 m)   Wt (!) 401 lb (181.9 kg)   SpO2 97%   BMI 52.91 kg/m²   No intake/output data recorded. PHYSICAL EXAMINATION:    Vital Signs: Please see the chart   MINDY:  Awake, alert, oriented x 3, patient appears tired and fatigued   Head/Eyes:  Normocephalic, atraumatic, EOMI and PERRLA bilaterally   ENT: Moist mucous membranes, nasal passages clear   Neck: Supple, full range of motion, no carotid bruit, trachea midline   Respiratory:   Bilateral decreased air entry in both lung fields, mild B/L crackles, symmetric expansion of chest   Cardiovascular:  Regular rate and rhythm, S1+S2+0, no murmurs/rubs   Urology: No bilateral CVA tenderness, no suprapubic tenderness   Abdomen:   Soft, + fluctuant tenderness over left buttocks, bowel sounds +ve, no organomegaly   Muscle/Joints: Moves all, decreased range of motion, no muscle spasms   Extremities: No clubbing, no cyanosis, no calf tenderness, no edema   Pulses: 2+ bilaterally, symmetrical   Skin: Warm, dry, + erythema/swelling with fluctuation over left buttock   Neurologic: Awake, alert, oriented x 3, cranial nerves II-XII intact, no focal neurological deficits, sensory system intact   Psychiatric: Normal mood, non-suicidal         LABORATORY DATA:    CBC:  Recent Labs     08/31/20  1350   WBC 14.6*   HGB 13.3*   HCT 40.6*        BMP:  Recent Labs     08/31/20  1350      K 4.2      CO2 25   BUN 9   CREATININE 0.7   CALCIUM 9.5     Recent Labs     08/31/20  1350   AST 23   ALT 41   BILITOT 0.5   ALKPHOS 71     Coag Panel: No results for input(s): INR, PROTIME, APTT in the last 72 hours. Cardiac Enzymes: No results for input(s): Leonel Shown in the last 72 hours.   ABGs:No results found for: PHART, PO2ART, CJG7FHV  Urinalysis:  Lab Results Component Value Date    NITRU Negative 04/02/2018    BLOODU Negative 04/02/2018    SPECGRAV 1.025 04/02/2018    GLUCOSEU 100 04/02/2018     A1C: No results for input(s): LABA1C in the last 72 hours. ABG:No results for input(s): PHART, VTO5KNR, PO2ART, CZW2VQG, BEART, HGBAE, U4GXXHGD, CARBOXHGBART in the last 72 hours. EKG:   Please see chart      IMAGING:  Ct Abdomen Pelvis W Iv Contrast Additional Contrast? None    Result Date: 8/31/2020  1. LEFT gluteal soft tissue inflammation extending to the LEFT posterior anus. No visible fistulous tract or large abscess. No evidence of deep extension into the perirectal soft tissues or involvement of the rectum. 2.  The liver is steatotic. 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    Generalized anxiety disorder    Psychophysiological insomnia    Hyperlipidemia associated with type 2 diabetes mellitus ([address]    Type 2 diabetes mellitus with diabetic polyneuropathy, without long-term current use of insulin ([address]    Morbid obesity with BMI of 50.0-59.9, adult ([address]    Sepsis ([address]    Uncontrolled type 2 diabetes mellitus with hyperglycemia (HCC)    Cigarette nicotine dependence with nicotine-induced disorder    Tobacco abuse counseling    Personal history of noncompliance with medical treatment and regimen  Resolved Problems:    * No resolved hospital problems.  *     Admit patient to medical unit under full inpatient status  Patient given IV Rocephin and Vancomycin in the ER  Pharmacy to dose IV vancomycin  Patient underwent I&D of gluteal abscess in the ER  Follow-up on cultures from the I&D  Patient lactic acid level was 3.4  Patient given septic dose of IV fluids based on patient's ideal body weight  Repeat lactic acid level in 4 hours  This is 1 hour evaluation as per sepsis protocol after initial evaluation in the ER for sepsis  Infectious disease consult given for evaluation and further treatment recommendations  General surgery consult given for evaluation and further treatment recommendations  Continue diabetic meds  Accu-Cheks qAC and qHS ordered with low-dose insulin coverage as per protocol  Increase glipizide to 10 mg p.o. twice daily  Patient's last hemoglobin A1c was 8.8 on 7/6/2020 showing poor control of diabetes mellitus  Strictly advised patient to watch diet, exercise regularly and lose a few pounds  Diet/nutrition consult ordered help patient with nutritional counseling      Dependence on nicotine from cigarettes          Tobacco abuse counseling  Patient smokes cigarettes on a chronic basis. Strictly advised patient to cut down on or quit smoking. Nicotine patch offered. ~5 minutes spent on tobacco cessation counseling with the patient. Websites - http://smokefree. gov & LegalWarrants[employer]. com   °Quitlines - 1-800-QUIT-NOW (1-546-716-023-241-9478)   °Smokefree Apps - QuitSTART [place]   °Text Messages - SmokefreeTXT (send the word QUIT to 62505)         Personal history of noncompliance with medical treatment and regimen  STRICTLY advised patient to be compliant with meds and medical recommendations  Advised patient to get established with a PCP upon discharge, take care of meds, diet and bring patient's self and life back on track    Repeat labs in a.m. Electrolyte replacement as per protocol. Patient will be monitored very closely on the floor. Further recommendations as per the hospital course. The patient's management will be taken over by my covering Hospitalist, Dr. Linda Fofana, in a.m . .. I am signing off! Patient  is on DVT prophylaxis  Current medications reviewed  Lab work reviewed  Radiology/Chest x-ray films reviewed  Discussed with the nurse and addressed all questions/concerns  Discussed with Patient and/or Family at the bedside in detail . .. they verbalize understanding and agree with the management plan.      Attestation:  A minimum of two midnights of inpatient hospital care is anticipated to be required based on the patient's clinical condition which requires intensive medical therapy. At this time the patient is not clinically optimized for safe discharge. Magdalena Sanders MD  5:45 PM 8/31/2020      DISCLAIMER: This note was created with electronic voice recognition which does have occasional errors. If you have any questions regarding the content within the note please do not hesitate to contact me. .. Thanks.

## 2020-08-31 NOTE — PROGRESS NOTES
Pharmacy Note  Vancomycin Consult    Arvind Wilson is a 38 y.o. male started on Vancomycin for abscess/cellulitis; consult received from Kylie ORNELAS to manage therapy. Also receiving the following antibiotics: none. Active Problems:    * No active hospital problems. *  Resolved Problems:    * No resolved hospital problems. *      Allergies:  Patient has no known allergies. Temp max: 98.8    Recent Labs     08/31/20  1350   BUN 9       Recent Labs     08/31/20  1350   CREATININE 0.7       Recent Labs     08/31/20  1350   WBC 14.6*       No intake or output data in the 24 hours ending 08/31/20 1631    Culture Date Source Results   ordered wound    08/31/20 blood collected            Ht Readings from Last 1 Encounters:   08/31/20 6' 1\" (1.854 m)        Wt Readings from Last 1 Encounters:   08/31/20 (!) 401 lb (181.9 kg)         Body mass index is 52.91 kg/m². Estimated Creatinine Clearance: 237 mL/min (based on SCr of 0.7 mg/dL). Assessment/Plan:  Will initiate vancomycin 1500 mg IV every 8 hours. Timing of trough level will be determined based on culture results, renal function, and clinical response. Thank you for the consult. Will continue to follow.     Electronically signed by Dontae Russo, [facility] on 8/31/2020 at 4:31 PM

## 2020-08-31 NOTE — ED NOTES
Bed: 21  Expected date:   Expected time:   Means of arrival:   Comments:     Petey Gallegos RN  08/31/20 [id number]

## 2020-08-31 NOTE — PROGRESS NOTES
[address] [facility] U[facility]  [address] [address] [place] [address]  Dept: [phone]  Dept Fax: [phone]  Loc: [phone]    Marquise Sheriff is a 38 y.o. male who presents today for his medical conditions/complaintsas noted below. Marquise Sheriff is c/o of Abscess        HPI:     Abscess   This is a new (went [employer] riding last weekend; noticed discomfort afterwards) problem. The current episode started in the past 7 days (noticed \"bump\" tuesday). The problem has been gradually worsening. Associated symptoms include chills, diaphoresis, fatigue, a fever and myalgias. Pertinent negatives include no chest pain, congestion, nausea, numbness, sore throat, vomiting or weakness. The symptoms are aggravated by walking. He has tried rest for the symptoms. The treatment provided no relief. Patient relates it has been draining spontaneously. Pain is severe and inhibits his ability to walk.      Past Medical History:   Diagnosis Date    Degenerative disc disease at L5-S1 level     Hyperlipidemia     Nerve pain     Type 2 diabetes mellitus without complication [facility])      Past Surgical History:   Procedure Laterality Date    INCISION AND DRAINAGE Right 7/7/2016    KNEE IRRIGATION AND DEBRIDEMENT  performed by Anabela Enrique MD at [facility] Right 5/31/2016    PERONEAL NERVE RELEASE ; RIGHT FIBULAR HEAD  performed by Anabela Enrique MD at [facility] OR       Family History   Problem Relation Age of Onset    Other Mother         blockage    High Blood Pressure Father     Diabetes Father        Social History     Tobacco Use    Smoking status: Current Every Day Smoker     Packs/day: 1.00     Years: 20.00     Pack years: 20.00     Types: Cigarettes    Smokeless tobacco: Never Used   Substance Use Topics    Alcohol use: Yes     Comment: occ      Current Outpatient Medications   Medication Sig Dispense Refill    glipiZIDE (GLUCOTROL XL) 5 MG extended release tablet Musculoskeletal: Positive for myalgias. Skin: Positive for color change. Neurological: Negative for dizziness, speech difficulty, weakness and numbness. Psychiatric/Behavioral: Negative for confusion. All other systems reviewed and are negative. Objective:     Physical Exam  Vitals signs and nursing note reviewed. Constitutional:       General: He is not in acute distress. Appearance: Normal appearance. He is obese. He is ill-appearing. He is not toxic-appearing. HENT:      Head: Normocephalic and atraumatic. Right Ear: External ear normal.      Left Ear: External ear normal.   Eyes:      Extraocular Movements: Extraocular movements intact. Conjunctiva/sclera: Conjunctivae normal.      Pupils: Pupils are equal, round, and reactive to light. Cardiovascular:      Rate and Rhythm: Normal rate and regular rhythm. Heart sounds: Normal heart sounds. No murmur. No gallop. Pulmonary:      Effort: Pulmonary effort is normal. No respiratory distress. Breath sounds: Normal breath sounds. No stridor. No wheezing or rhonchi. Genitourinary:      Musculoskeletal:         General: No swelling or signs of injury. Skin:     General: Skin is warm and dry. Coloration: Skin is pale (pallor to face). Findings: Abscess and erythema present. Comments: Pt noted diaphoretic   Neurological:      General: No focal deficit present. Mental Status: He is alert and oriented to person, place, and time. Motor: No weakness. Psychiatric:         Mood and Affect: Mood normal.       BP (!) 142/78   Pulse 100   Temp 98.8 °F (37.1 °C) (Oral)   Resp 20   Ht 6' 2\" (1.88 m)   Wt (!) 401 lb 9.6 oz (182.2 kg)   SpO2 98%   BMI 51.56 kg/m²     Assessment:       Diagnosis Orders   1. Abscess  POCT Glucose   2.  Cellulitis of buttock  POCT Glucose       Plan:      Orders Placed This Encounter   Procedures    POCT Glucose     Results for orders placed or performed in visit on 08/31/20   POCT Glucose   Result Value Ref Range    Glucose 334 mg/dL    QC OK? Patient is a diabetic. Per patient he has not felt like eating the last few days and has not been taking his medications. Patient is on glipizide, starlix, and metformin. Patient also relates he has no way to check sugar at home. Last A1C 8.8 on 7/6/20. Glucose in office 334. Due to patients history of diabetes, large area of cellulitis along with multiple abscesses, systemic symptoms (fevers, chills, fatigue, loss of appetite, diarrhea), and severe pain I am recommending patient go to ER for higher level of care. I am concerned that patient needs imaging of area to rule out tunneling infection along with IV antibiotics and pain control. Discussed concerns with patient. Patient in agreement to be seen in ED. Patient left in stable condition. Report given to Gilbert James at [facility] ED. No follow-ups on file. No orders of the defined types were placed in this encounter. Patient given educational materials- see patient instructions. Discussed use, benefit, and side effects of prescribedmedications. All patient questions answered. Pt voiced understanding. Reviewedhealth maintenance. Instructed to continue current medications, diet and exercise. Patient agreed with treatment plan. Follow up as directed. There are no Patient Instructions on file for this visit.       Electronically signed by ALBERT Byrd CNP on 8/31/2020 at 1:00 PM

## 2020-08-31 NOTE — LETTER
[facility] 5 Surg Services  [address]  [address] [place] [address]  Phone: [phone]             September 5, 2020    Patient: Dunia Agosto   YOB: 1980   Date of Visit: 8/31/2020       To Whom It May Concern:    Dunia Agosto was seen and treated in our facility  beginning 8/31/2020 until . He may return to work on 09/15/20.       Sincerely,       Lida Tilley RN         Signature:__________________________________

## 2020-08-31 NOTE — ED NOTES
Bed: 22  Expected date:   Expected time:   Means of arrival:   Comments:     Nandini Ornelas RN  08/31/20 [id number]

## 2020-08-31 NOTE — LETTER
[facility] 5 Surg Services  [address]  [address] [place] [address]  Phone: [phone]             September 5, 2020    Patient: Chris Boyce   YOB: 1980   Date of Visit: 8/31/2020       To Whom It May Concern:    Chris Boyce was seen and treated in our facility  beginning 8/31/2020 until 09/05/2020. He may return to work on 09/15/2020.       Sincerely,       Trell Sotelo RN         Signature:__________________________________

## 2020-08-31 NOTE — ED PROVIDER NOTES
[facility] 5 SURG SERVICES  eMERGENCYdEPARTMENT eNCOUnter      Pt Name: Janice Dowling  MRN: [id number]  [place] 1980  Date of evaluation: 8/31/2020  Provider:CECI Soto    CHIEF COMPLAINT       Chief Complaint   Patient presents with    Wound Infection     in gluteal fold         HISTORY OF PRESENT ILLNESS  (Location/Symptom, Timing/Onset, Context/Setting, Quality, Duration, Modifying Factors, Severity.)   Janice Dowling is a 38 y.o. male who presents to the emergency department with complaints of wound infection on his left gluteal cleft involves most of his buttock going down into his rectum sent here by urgent care with concern for possible tunneling. They wanted a CT to be done patient admits to known diabetes well controlled he is morbidly obese. Patient admits to an ingrown hair at this site x1 in the past he thought was either from here or being on 4 mitchell for 2 many hours. He denies any change in his GI output. He admits to discharge including serosanguineous and white possibly pus. He has 2 spots already obvious to me where drainage is wanting to occur. He denies any prior I&D. He denies any fever today but had fever over the past week intermittently. 96.7 here. Unsure of his kidney function he is unsure of his tetanus status that shows that it was updated 2016. Today blood sugar is 334 normal kidney function on July 6 of this year. HPI    Nursing Notes were reviewed and I agree. REVIEW OF SYSTEMS    (2-9 systems for level 4, 10 or more for level 5)     Review of Systems   Constitutional: Negative for activity change, appetite change, chills and fever. HENT: Negative for congestion and dental problem. Eyes: Negative for photophobia, discharge and itching. Respiratory: Negative for apnea, cough and shortness of breath. Cardiovascular: Negative for chest pain. Musculoskeletal: Negative for arthralgias, back pain, gait problem, myalgias and neck pain.    Skin: Positive for wound. Negative for color change, pallor and rash. Neurological: Negative for dizziness, seizures and syncope. Psychiatric/Behavioral: Negative for agitation. The patient is not nervous/anxious. Except as noted above the remainder of the review of systems was reviewed and negative. PAST MEDICAL HISTORY     Past Medical History:   Diagnosis Date    Degenerative disc disease at L5-S1 level     Hyperlipidemia     Nerve pain     Type 2 diabetes mellitus without complication ([facility])          SURGICAL HISTORY       Past Surgical History:   Procedure Laterality Date    INCISION AND DRAINAGE Right 7/7/2016    KNEE IRRIGATION AND DEBRIDEMENT  performed by Spencer Todd MD at [address] Right 5/31/2016    PERONEAL NERVE RELEASE ; RIGHT FIBULAR HEAD  performed by Spencer Todd MD at [address] [facility]       Current Discharge Medication List      CONTINUE these medications which have NOT CHANGED    Details   glipiZIDE (GLUCOTROL XL) 5 MG extended release tablet Take 1 tablet by mouth daily (with breakfast)  Qty: 30 tablet, Refills: 1    Associated Diagnoses: Type 2 diabetes mellitus with diabetic polyneuropathy, without long-term current use of insulin ([facility])      Cetirizine HCl (ZYRTEC ALLERGY) 10 MG CAPS Take 10 mg by mouth nightly  Qty: 90 capsule, Refills: 1      nateglinide (STARLIX) 120 MG tablet Take 1 tablet by mouth 3 times daily (before meals)  Qty: 270 tablet, Refills: 3      gabapentin (NEURONTIN) 300 MG capsule Take 1 capsule by mouth 3 times daily for 30 days.   Qty: 90 capsule, Refills: 5    Associated Diagnoses: Cervical radiculopathy      metFORMIN (GLUCOPHAGE) 1000 MG tablet Take 1 tablet by mouth 2 times daily (with meals)  Qty: 180 tablet, Refills: 3      diclofenac (VOLTAREN) 50 MG EC tablet TAKE 1 TABLET BY MOUTH THREE TIMES DAILY WITH MEALS  Qty: 90 tablet, Refills: 3      aspirin EC 81 MG EC tablet Take 1 tablet by mouth daily  Qty: 90 tablet, Refills: 3      fluticasone (FLONASE) 50 MCG/ACT nasal spray 1 spray by Each Nostril route daily  Qty: 1 Bottle, Refills: 2      methylPREDNISolone (MEDROL, ANDREY,) 4 MG tablet Take by mouth. Qty: 1 kit, Refills: 0             ALLERGIES     Patient has no known allergies.     FAMILY HISTORY       Family History   Problem Relation Age of Onset    Other Mother         blockage    High Blood Pressure Father     Diabetes Father           SOCIAL HISTORY       Social History     Socioeconomic History    Marital status:      Spouse name: None    Number of children: None    Years of education: None    Highest education level: None   Occupational History    None   Social Needs    Financial resource strain: None    Food insecurity     Worry: None     Inability: None    Transportation needs     Medical: None     Non-medical: None   Tobacco Use    Smoking status: Current Every Day Smoker     Packs/day: 1.00     Years: 20.00     Pack years: 20.00     Types: Cigarettes    Smokeless tobacco: Never Used   Substance and Sexual Activity    Alcohol use: Yes     Comment: occ    Drug use: No    Sexual activity: None   Lifestyle    Physical activity     Days per week: None     Minutes per session: None    Stress: None   Relationships    Social connections     Talks on phone: None     Gets together: None     Attends Zoroastrianism service: None     Active member of club or organization: None     Attends meetings of clubs or organizations: None     Relationship status: None    Intimate partner violence     Fear of current or ex partner: None     Emotionally abused: None     Physically abused: None     Forced sexual activity: None   Other Topics Concern    None   Social History Narrative    None       SCREENINGS    Deary Coma Scale  Eye Opening: Spontaneous  Best Verbal Response: Oriented  Best Motor Response: Obeys commands  Juanita Coma Scale Score: 15      PHYSICAL EXAM    (up to 7 forlevel 4, 8 or more for level 5)     ED Triage Vitals [08/31/20 1324]   BP Temp Temp src Pulse Resp SpO2 Height Weight   (!) 148/82 96.7 °F (35.9 °C) -- 119 18 97 % 6' 1\" (1.854 m) (!) 401 lb (181.9 kg)       Physical Exam  Vitals signs and nursing note reviewed. Constitutional:       General: He is not in acute distress. Appearance: Normal appearance. He is well-developed. He is obese. He is not diaphoretic. HENT:      Head: Normocephalic and atraumatic. Right Ear: Tympanic membrane, ear canal and external ear normal.      Left Ear: Tympanic membrane, ear canal and external ear normal.      Nose: Nose normal.      Mouth/Throat:      Mouth: Mucous membranes are moist.   Eyes:      Pupils: Pupils are equal, round, and reactive to light. Neck:      Musculoskeletal: Normal range of motion and neck supple. Trachea: No tracheal deviation. Cardiovascular:      Rate and Rhythm: Normal rate and regular rhythm. Heart sounds: Normal heart sounds. No murmur. Pulmonary:      Effort: Pulmonary effort is normal.      Breath sounds: Normal breath sounds. No stridor. No wheezing. Chest:      Chest wall: No tenderness. Abdominal:      General: Bowel sounds are normal. There is no distension. Palpations: Abdomen is soft. Tenderness: There is no abdominal tenderness. Genitourinary:      Musculoskeletal: Normal range of motion. Skin:     General: Skin is warm and dry. Capillary Refill: Capillary refill takes less than 2 seconds. Neurological:      General: No focal deficit present. Mental Status: He is alert and oriented to person, place, and time. Mental status is at baseline. Psychiatric:         Mood and Affect: Mood normal.         Behavior: Behavior normal.         Thought Content:  Thought content normal.         Judgment: Judgment normal.           DIAGNOSTIC RESULTS     RADIOLOGY:   Non-plain film images such as CT, Ultrasound and MRI are read by the radiologist. Plain radiographic images are visualized and preliminarilyinterpreted by Gabriella Allen MD with the below findings:        Interpretation per the Radiologist below, if available at the time of this note:    CT ABDOMEN PELVIS W IV CONTRAST Additional Contrast? None   Final Result   1. LEFT gluteal soft tissue inflammation extending to the LEFT   posterior anus. No visible fistulous tract or large abscess. No   evidence of deep extension into the perirectal soft tissues or   involvement of the rectum. 2.  The liver is steatotic.    Signed by Dr Julian Raymond on 8/31/2020 3:39 PM          LABS:  Labs Reviewed   CBC WITH AUTO DIFFERENTIAL - Abnormal; Notable for the following components:       Result Value    WBC 14.6 (*)     RBC 4.52 (*)     Hemoglobin 13.3 (*)     Hematocrit 40.6 (*)     MCHC 32.8 (*)     Neutrophils % 83.5 (*)     Lymphocytes % 9.1 (*)     Neutrophils Absolute 12.2 (*)     All other components within normal limits   COMPREHENSIVE METABOLIC PANEL W/ REFLEX TO MG FOR LOW K - Abnormal; Notable for the following components:    Glucose 332 (*)     All other components within normal limits   LACTATE, SEPSIS - Abnormal; Notable for the following components:    Lactic Acid, Sepsis 3.4 (*)     All other components within normal limits    Narrative:     CALL  [facility]  K[facility] tel. ,  Chemistry results called to and read back by Delon/RN/ER, 08/31/2020 14:21, by  JASON   CBC WITH AUTO DIFFERENTIAL - Abnormal; Notable for the following components:    WBC 12.0 (*)     RBC 4.47 (*)     Hemoglobin 13.1 (*)     Hematocrit 39.6 (*)     Neutrophils % 75.1 (*)     Lymphocytes % 15.1 (*)     Neutrophils Absolute 9.0 (*)     All other components within normal limits   BASIC METABOLIC PANEL - Abnormal; Notable for the following components:    Glucose 206 (*)     All other components within normal limits   POCT GLUCOSE - Abnormal; Notable for the following components:    POC Glucose 175 (*)     All other components within normal limits   POCT GLUCOSE - Abnormal; Notable for the following components:    POC Glucose 200 (*)     All other components within normal limits   POCT GLUCOSE - Abnormal; Notable for the following components:    POC Glucose 207 (*)     All other components within normal limits   POCT GLUCOSE - Abnormal; Notable for the following components:    POC Glucose 205 (*)     All other components within normal limits   CULTURE, BLOOD 1    Narrative:     ORDER#: [id number]                          ORDERED BY: HIREN HOUGH  SOURCE: Blood Antecubital-Lef              COLLECTED:  08/31/20 13:50  ANTIBIOTICS AT ZEYAD.:                      RECEIVED :  08/31/20 13:59   CULTURE, BLOOD 2    Narrative:     ORDER#: [id number]                          ORDERED BY: HIREN HOUGH  SOURCE: Blood Antecubital-Rig              COLLECTED:  08/31/20 13:52  ANTIBIOTICS AT ZEYAD.:                      RECEIVED :  08/31/20 14:00   CULTURE, ANAEROBIC AND AEROBIC    Narrative:     ORDER#: [id number]                          ORDERED BY: HIREN HOUGH  SOURCE: Abscess buttock                    COLLECTED:  08/31/20 16:40  ANTIBIOTICS AT ZEYAD.:                      RECEIVED :  08/31/20 16:42   MAGNESIUM   PHOSPHORUS   LACTIC ACID, PLASMA   VANCOMYCIN, TROUGH   POCT GLUCOSE   POCT GLUCOSE   POCT GLUCOSE   POCT GLUCOSE   POCT GLUCOSE   POCT GLUCOSE       All other labs were within normal range or notreturned as of this dictation.     RE-ASSESSMENT        EMERGENCY DEPARTMENT COURSE and DIFFERENTIAL DIAGNOSIS/MDM:   Vitals:    Vitals:    09/01/20 0241 09/01/20 0618 09/01/20 1037 09/01/20 1349   BP: 114/69 110/67 110/62 125/79   Pulse: 78 76 85 90   Resp: 18 18 18 16   Temp: 96.6 °F (35.9 °C) 96.2 °F (35.7 °C) 98 °F (36.7 °C) 98 °F (36.7 °C)   TempSrc: Temporal Temporal Temporal Temporal   SpO2: 93% 94% 95% 90%   Weight:       Height:           MDM  I spoke with Dr. Nancylee Primrose who is on-call for general surgery she agrees to see in consult she agrees that simply lancing and starting on antibiotic should do well for this patient given his pulse lactic and white count I think it be appropriate with his poorly controlled diabetes and obesity to use utilize IV antibiotics admission and observation over discharged with oral antibiotics. She agrees. My attending is in agreement as well we admit to the hospitalist service. PROCEDURES:    Incision/Drainage    Date/Time: 9/1/2020 9:59 AM  Performed by: CECI Rasmussen  Authorized by: Janean Heimlich, MD             FINAL IMPRESSION      1. Abscess of anal or rectal region    2. Poorly controlled diabetes mellitus ([facility]    3.  Morbid obesity ([facility]          DISPOSITION/PLAN   DISPOSITION Admitted 08/31/2020 05:21:49 PM      PATIENT REFERRED TO:  Kimi Nelson MD  [facility] [id number]  [place] [address] [facility]  [address] [facility] [phone]            DISCHARGE MEDICATIONS:  Current Discharge Medication List          (Please note that portions of this note were completed with a voice recognition program.  Efforts were made to edit the dictations but occasionallywords are mis-transcribed.)    Florentino Rasmussen [address] [place] [address]  09/01/20 1708

## 2020-09-01 LAB
ANION GAP SERPL CALCULATED.3IONS-SCNC: 11 MMOL/L (ref 7–19)
BASOPHILS ABSOLUTE: 0 K/UL (ref 0–0.2)
BASOPHILS RELATIVE PERCENT: 0.3 % (ref 0–1)
BUN BLDV-MCNC: 12 MG/DL (ref 6–20)
CALCIUM SERPL-MCNC: 9.2 MG/DL (ref 8.6–10)
CHLORIDE BLD-SCNC: 99 MMOL/L (ref 98–111)
CO2: 27 MMOL/L (ref 22–29)
CREAT SERPL-MCNC: 0.6 MG/DL (ref 0.5–1.2)
EOSINOPHILS ABSOLUTE: 0.3 K/UL (ref 0–0.6)
EOSINOPHILS RELATIVE PERCENT: 2.2 % (ref 0–5)
GFR AFRICAN AMERICAN: >59
GFR NON-AFRICAN AMERICAN: >60
GLUCOSE BLD-MCNC: 175 MG/DL (ref 70–99)
GLUCOSE BLD-MCNC: 200 MG/DL (ref 70–99)
GLUCOSE BLD-MCNC: 205 MG/DL (ref 70–99)
GLUCOSE BLD-MCNC: 206 MG/DL (ref 74–109)
GLUCOSE BLD-MCNC: 207 MG/DL (ref 70–99)
HCT VFR BLD CALC: 39.6 % (ref 42–52)
HEMOGLOBIN: 13.1 G/DL (ref 14–18)
IMMATURE GRANULOCYTES #: 0.1 K/UL
LACTIC ACID: 1.1 MMOL/L (ref 0.5–1.9)
LYMPHOCYTES ABSOLUTE: 1.8 K/UL (ref 1.1–4.5)
LYMPHOCYTES RELATIVE PERCENT: 15.1 % (ref 20–40)
MAGNESIUM: 2.4 MG/DL (ref 1.6–2.6)
MCH RBC QN AUTO: 29.3 PG (ref 27–31)
MCHC RBC AUTO-ENTMCNC: 33.1 G/DL (ref 33–37)
MCV RBC AUTO: 88.6 FL (ref 80–94)
MONOCYTES ABSOLUTE: 0.8 K/UL (ref 0–0.9)
MONOCYTES RELATIVE PERCENT: 6.4 % (ref 0–10)
NEUTROPHILS ABSOLUTE: 9 K/UL (ref 1.5–7.5)
NEUTROPHILS RELATIVE PERCENT: 75.1 % (ref 50–65)
PDW BLD-RTO: 13.1 % (ref 11.5–14.5)
PERFORMED ON: ABNORMAL
PHOSPHORUS: 2.8 MG/DL (ref 2.5–4.5)
PLATELET # BLD: 345 K/UL (ref 130–400)
PMV BLD AUTO: 10.3 FL (ref 9.4–12.4)
POTASSIUM SERPL-SCNC: 3.9 MMOL/L (ref 3.5–5)
RBC # BLD: 4.47 M/UL (ref 4.7–6.1)
SODIUM BLD-SCNC: 137 MMOL/L (ref 136–145)
VANCOMYCIN TROUGH: 11.1 UG/ML (ref 10–20)
WBC # BLD: 12 K/UL (ref 4.8–10.8)

## 2020-09-01 PROCEDURE — 2580000003 HC RX 258: Performed by: HOSPITALIST

## 2020-09-01 PROCEDURE — 6370000000 HC RX 637 (ALT 250 FOR IP): Performed by: HOSPITALIST

## 2020-09-01 PROCEDURE — 85025 COMPLETE CBC W/AUTO DIFF WBC: CPT

## 2020-09-01 PROCEDURE — 6360000002 HC RX W HCPCS: Performed by: HOSPITALIST

## 2020-09-01 PROCEDURE — 6360000002 HC RX W HCPCS: Performed by: INTERNAL MEDICINE

## 2020-09-01 PROCEDURE — 80048 BASIC METABOLIC PNL TOTAL CA: CPT

## 2020-09-01 PROCEDURE — 99251 PR INITL INPATIENT CONSULT NEW/ESTAB PT 20 MIN: CPT | Performed by: SURGERY

## 2020-09-01 PROCEDURE — 36415 COLL VENOUS BLD VENIPUNCTURE: CPT

## 2020-09-01 PROCEDURE — 83605 ASSAY OF LACTIC ACID: CPT

## 2020-09-01 PROCEDURE — 82947 ASSAY GLUCOSE BLOOD QUANT: CPT

## 2020-09-01 PROCEDURE — 84100 ASSAY OF PHOSPHORUS: CPT

## 2020-09-01 PROCEDURE — 0Y910ZZ DRAINAGE OF LEFT BUTTOCK, OPEN APPROACH: ICD-10-PCS | Performed by: FAMILY MEDICINE

## 2020-09-01 PROCEDURE — 80202 ASSAY OF VANCOMYCIN: CPT

## 2020-09-01 PROCEDURE — 1210000000 HC MED SURG R&B

## 2020-09-01 PROCEDURE — 83735 ASSAY OF MAGNESIUM: CPT

## 2020-09-01 PROCEDURE — 6370000000 HC RX 637 (ALT 250 FOR IP): Performed by: FAMILY MEDICINE

## 2020-09-01 PROCEDURE — 2580000003 HC RX 258: Performed by: INTERNAL MEDICINE

## 2020-09-01 RX ORDER — DEXTROSE MONOHYDRATE 25 G/50ML
12.5 INJECTION, SOLUTION INTRAVENOUS PRN
Status: DISCONTINUED | OUTPATIENT
Start: 2020-09-01 | End: 2020-09-05 | Stop reason: HOSPADM

## 2020-09-01 RX ORDER — NICOTINE 21 MG/24HR
1 PATCH, TRANSDERMAL 24 HOURS TRANSDERMAL DAILY
Status: DISCONTINUED | OUTPATIENT
Start: 2020-09-01 | End: 2020-09-01

## 2020-09-01 RX ORDER — NICOTINE POLACRILEX 4 MG
15 LOZENGE BUCCAL PRN
Status: DISCONTINUED | OUTPATIENT
Start: 2020-09-01 | End: 2020-09-05 | Stop reason: HOSPADM

## 2020-09-01 RX ORDER — HYDROCODONE BITARTRATE AND ACETAMINOPHEN 7.5; 325 MG/1; MG/1
1 TABLET ORAL EVERY 6 HOURS PRN
Status: DISCONTINUED | OUTPATIENT
Start: 2020-09-01 | End: 2020-09-03

## 2020-09-01 RX ORDER — NICOTINE 21 MG/24HR
1 PATCH, TRANSDERMAL 24 HOURS TRANSDERMAL DAILY
Status: DISCONTINUED | OUTPATIENT
Start: 2020-09-01 | End: 2020-09-05 | Stop reason: HOSPADM

## 2020-09-01 RX ORDER — DEXTROSE MONOHYDRATE 50 MG/ML
100 INJECTION, SOLUTION INTRAVENOUS PRN
Status: DISCONTINUED | OUTPATIENT
Start: 2020-09-01 | End: 2020-09-05 | Stop reason: HOSPADM

## 2020-09-01 RX ADMIN — METFORMIN HYDROCHLORIDE 1000 MG: 500 TABLET ORAL at 17:22

## 2020-09-01 RX ADMIN — FAMOTIDINE 20 MG: 20 TABLET ORAL at 08:36

## 2020-09-01 RX ADMIN — FLUTICASONE PROPIONATE 1 SPRAY: 50 SPRAY, METERED NASAL at 08:51

## 2020-09-01 RX ADMIN — GABAPENTIN 300 MG: 300 CAPSULE ORAL at 08:36

## 2020-09-01 RX ADMIN — INSULIN LISPRO 2 UNITS: 100 INJECTION, SOLUTION INTRAVENOUS; SUBCUTANEOUS at 12:29

## 2020-09-01 RX ADMIN — ASPIRIN 81 MG: 81 TABLET, COATED ORAL at 08:36

## 2020-09-01 RX ADMIN — GLIPIZIDE 10 MG: 10 TABLET ORAL at 20:35

## 2020-09-01 RX ADMIN — ACETAMINOPHEN 650 MG: 325 TABLET, FILM COATED ORAL at 08:47

## 2020-09-01 RX ADMIN — VANCOMYCIN HYDROCHLORIDE 1500 MG: 10 INJECTION, POWDER, LYOPHILIZED, FOR SOLUTION INTRAVENOUS at 12:01

## 2020-09-01 RX ADMIN — VANCOMYCIN HYDROCHLORIDE 1500 MG: 10 INJECTION, POWDER, LYOPHILIZED, FOR SOLUTION INTRAVENOUS at 01:28

## 2020-09-01 RX ADMIN — HYDROCODONE BITARTRATE AND ACETAMINOPHEN 1 TABLET: 7.5; 325 TABLET ORAL at 20:35

## 2020-09-01 RX ADMIN — REPAGLINIDE 1 MG: 0.5 TABLET ORAL at 16:47

## 2020-09-01 RX ADMIN — SODIUM CHLORIDE, PRESERVATIVE FREE 10 ML: 5 INJECTION INTRAVENOUS at 08:52

## 2020-09-01 RX ADMIN — FAMOTIDINE 20 MG: 20 TABLET ORAL at 20:35

## 2020-09-01 RX ADMIN — VANCOMYCIN HYDROCHLORIDE 1500 MG: 10 INJECTION, POWDER, LYOPHILIZED, FOR SOLUTION INTRAVENOUS at 18:21

## 2020-09-01 RX ADMIN — HYDROCODONE BITARTRATE AND ACETAMINOPHEN 1 TABLET: 7.5; 325 TABLET ORAL at 13:45

## 2020-09-01 RX ADMIN — SODIUM CHLORIDE, PRESERVATIVE FREE 10 ML: 5 INJECTION INTRAVENOUS at 20:35

## 2020-09-01 RX ADMIN — CETIRIZINE HYDROCHLORIDE 10 MG: 10 TABLET, FILM COATED ORAL at 20:35

## 2020-09-01 RX ADMIN — ENOXAPARIN SODIUM 40 MG: 100 INJECTION SUBCUTANEOUS at 08:35

## 2020-09-01 RX ADMIN — GABAPENTIN 300 MG: 300 CAPSULE ORAL at 13:45

## 2020-09-01 RX ADMIN — GABAPENTIN 300 MG: 300 CAPSULE ORAL at 20:35

## 2020-09-01 RX ADMIN — PIPERACILLIN SODIUM AND TAZOBACTAM SODIUM 4.5 G: 4; .5 INJECTION, POWDER, LYOPHILIZED, FOR SOLUTION INTRAVENOUS at 23:33

## 2020-09-01 RX ADMIN — METFORMIN HYDROCHLORIDE 1000 MG: 500 TABLET ORAL at 08:36

## 2020-09-01 RX ADMIN — INSULIN LISPRO 2 UNITS: 100 INJECTION, SOLUTION INTRAVENOUS; SUBCUTANEOUS at 17:22

## 2020-09-01 RX ADMIN — INSULIN LISPRO 2 UNITS: 100 INJECTION, SOLUTION INTRAVENOUS; SUBCUTANEOUS at 08:35

## 2020-09-01 ASSESSMENT — ENCOUNTER SYMPTOMS
CONSTIPATION: 0
NAUSEA: 0
BACK PAIN: 0
VOMITING: 0
SHORTNESS OF BREATH: 0
SORE THROAT: 0
ABDOMINAL DISTENTION: 0
EYE PAIN: 0
COLOR CHANGE: 0
COUGH: 1
CHEST TIGHTNESS: 0
RHINORRHEA: 1
DIARRHEA: 0
EYE REDNESS: 0
ABDOMINAL PAIN: 0

## 2020-09-01 ASSESSMENT — PAIN DESCRIPTION - ONSET
ONSET: ON-GOING
ONSET: ON-GOING

## 2020-09-01 ASSESSMENT — PAIN DESCRIPTION - FREQUENCY
FREQUENCY: CONTINUOUS
FREQUENCY: CONTINUOUS

## 2020-09-01 ASSESSMENT — PAIN DESCRIPTION - LOCATION
LOCATION: BUTTOCKS
LOCATION: BUTTOCKS

## 2020-09-01 ASSESSMENT — PAIN SCALES - GENERAL
PAINLEVEL_OUTOF10: 8
PAINLEVEL_OUTOF10: 4
PAINLEVEL_OUTOF10: 8
PAINLEVEL_OUTOF10: 5
PAINLEVEL_OUTOF10: 7
PAINLEVEL_OUTOF10: 4

## 2020-09-01 ASSESSMENT — PAIN - FUNCTIONAL ASSESSMENT
PAIN_FUNCTIONAL_ASSESSMENT: PREVENTS OR INTERFERES SOME ACTIVE ACTIVITIES AND ADLS
PAIN_FUNCTIONAL_ASSESSMENT: PREVENTS OR INTERFERES SOME ACTIVE ACTIVITIES AND ADLS

## 2020-09-01 ASSESSMENT — PAIN DESCRIPTION - PROGRESSION
CLINICAL_PROGRESSION: GRADUALLY IMPROVING
CLINICAL_PROGRESSION: GRADUALLY IMPROVING

## 2020-09-01 ASSESSMENT — PAIN DESCRIPTION - PAIN TYPE
TYPE: ACUTE PAIN
TYPE: ACUTE PAIN

## 2020-09-01 ASSESSMENT — PAIN DESCRIPTION - DESCRIPTORS
DESCRIPTORS: ACHING;SORE
DESCRIPTORS: ACHING;SORE

## 2020-09-01 ASSESSMENT — PAIN DESCRIPTION - ORIENTATION
ORIENTATION: RIGHT;MID
ORIENTATION: RIGHT;MID

## 2020-09-01 NOTE — PROGRESS NOTES
Progress Note    Date:9/1/2020       Room:0511/511-01  Patient Name:Pérez Pinedo     YOB: 1980     Age:40 y.o. Subjective   Interval History Status: unchanged. Patient seen and examined this a.m. and the mother present at the bedside as well as nursing staff. Patient is a continues to complain of pain in the left gluteal region. Inquiring as to when he can have a meal remains n.p.o.  Currently denies any headache, change in vision, chest pain, abdominal pain, nausea vomiting, fevers or chills. Cumulative hospital course: Patient admitted 8/31 scented to the emergency room with complaints of worsening swelling of the left buttock region painful to touch as well as fluctuant. Patient underwent lancing of the area in the emergency room with wound cultures. General surgery/infectious disease consultation, patient placed on vancomycin status post 1 dose of ceftriaxone. Blood cultures in process, lactic acid has resolved. Review of Systems   Review of Systems  ROS: 14 point review of systems is negative except as specifically addressed above.   Medications   Scheduled Meds:    nicotine  1 patch Transdermal Daily    vancomycin  1,500 mg Intravenous Q8H    vancomycin (VANCOCIN) intermittent dosing (placeholder)   Other RX Placeholder    fluticasone  1 spray Each Nostril Daily    cetirizine  10 mg Oral Nightly    repaglinide  1 mg Oral TID AC    gabapentin  300 mg Oral TID    metFORMIN  1,000 mg Oral BID WC    aspirin EC  81 mg Oral Daily    glipiZIDE  10 mg Oral QAM AC    sodium chloride flush  10 mL Intravenous 2 times per day    famotidine  20 mg Oral BID    enoxaparin  40 mg Subcutaneous Daily    insulin lispro  0-6 Units Subcutaneous TID WC    insulin lispro  0-3 Units Subcutaneous Nightly     Continuous Infusions:    lactated ringers       PRN Meds: sodium chloride flush, potassium chloride **OR** potassium alternative oral replacement **OR** potassium chloride, magnesium sulfate, acetaminophen **OR** acetaminophen, polyethylene glycol, promethazine **OR** ondansetron    Past History    Past Medical History:   has a past medical history of Degenerative disc disease at L5-S1 level, Hyperlipidemia, Nerve pain, and Type 2 diabetes mellitus without complication ([address] Social History:   reports that he has been smoking cigarettes. He has a 20.00 pack-year smoking history. He has never used smokeless tobacco. He reports current alcohol use. He reports that he does not use drugs. Family History:   Family History   Problem Relation Age of Onset    Other Mother         blockage    High Blood Pressure Father     Diabetes Father        Physical Examination      Vitals:  /67   Pulse 76   Temp 96.2 °F (35.7 °C) (Temporal)   Resp 18   Ht 6' 1\" (1.854 m)   Wt (!) 401 lb (181.9 kg)   SpO2 94%   BMI 52.91 kg/m²   Temp (24hrs), Av.4 °F (36.3 °C), Min:96.2 °F (35.7 °C), Max:99 °F (37.2 °C)      I/O (24Hr): Intake/Output Summary (Last 24 hours) at 2020 1032  Last data filed at 2020 0241  Gross per 24 hour   Intake 360 ml   Output 100 ml   Net 260 ml       Physical Exam  Vitals signs and nursing note reviewed. Constitutional:       General: He is not in acute distress. Appearance: He is obese. He is not diaphoretic. HENT:      Head: Normocephalic and atraumatic. Nose: Nose normal.   Eyes:      General:         Right eye: No discharge. Left eye: No discharge. Conjunctiva/sclera: Conjunctivae normal.   Neck:      Musculoskeletal: Normal range of motion. Cardiovascular:      Rate and Rhythm: Normal rate and regular rhythm. Pulmonary:      Effort: Pulmonary effort is normal.      Breath sounds: No wheezing or rales. Abdominal:      General: Bowel sounds are normal.      Tenderness: There is no abdominal tenderness. There is no guarding or rebound. Genitourinary:      Musculoskeletal: Normal range of motion.    Neurological: Mental Status: He is alert. Labs/Imaging/Diagnostics   Labs:  CBC:  Recent Labs     08/31/20  1350   WBC 14.6*   RBC 4.52*   HGB 13.3*   HCT 40.6*   MCV 89.8   RDW 12.9        CHEMISTRIES:  Recent Labs     08/31/20  1241 08/31/20  1350   NA  --  141   K  --  4.2   CL  --  101   CO2  --  25   BUN  --  9   CREATININE  --  0.7   GLUCOSE 334 332*     PT/INR:No results for input(s): PROTIME, INR in the last 72 hours. APTT:No results for input(s): APTT in the last 72 hours. LIVER PROFILE:  Recent Labs     08/31/20  1350   AST 23   ALT 41   BILITOT 0.5   ALKPHOS 71       Imaging Last 24 Hours:  Ct Abdomen Pelvis W Iv Contrast Additional Contrast? None    Result Date: 8/31/2020  Exam: CT ABDOMEN PELVIS W IV CONTRAST - 8/31/2020 2:00 PM Indication: Concern for tracking of abscess, LEFT buttock boil Comparison: None available. DLP: 2324 mGy cm. In order to have a CT radiation dose as low as reasonably achievable, Automated Exposure Control was utilized for adjustment of the mA and/or KV according to patient size. Findings: Subtle tissue inflammation in the medial LEFT gluteal soft tissues extending into the LEFT perianal region. No fistulous tract identified although inflammation does abut the LEFT posterior aspect of the anus. No evidence of tracking into the perirectal soft tissues or rectum. No large organized fluid collection/abscess. Clear lung bases. The liver is steatotic. No gallstones or gallbladder wall thickening. No biliary ductal dilatation. Small calcification the pancreatic tail may be related to prior insult. The spleen and adrenal glands appear unremarkable. No urolithiasis or hydronephrosis. No solid renal mass. Urinary bladder appears unremarkable. Colonic diverticulum are noted. There is no evidence of bowel obstruction or active bowel inflammation. A normal appendix is identified. No ascites or free pelvic fluid. No pelvic mass or pelvic collection. Normal caliber abdominal aorta.  No enlarged retroperitoneal, mesenteric, pelvic, or inguinal lymph nodes. No aggressive bone lesions identified. 1.  LEFT gluteal soft tissue inflammation extending to the LEFT posterior anus. No visible fistulous tract or large abscess. No evidence of deep extension into the perirectal soft tissues or involvement of the rectum. 2.  The liver is steatotic.  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 8/31/2020 Yes    Generalized anxiety disorder 8/31/2020 Yes    Psychophysiological insomnia 8/31/2020 Yes    Hyperlipidemia associated with type 2 diabetes mellitus ([address] 8/31/2020 Yes    Type 2 diabetes mellitus with diabetic polyneuropathy, without long-term current use of insulin ([address] 8/31/2020 Yes    Morbid obesity with BMI of 50.0-59.9, adult ([address] 8/31/2020 Yes    Sepsis ([address] 8/31/2020 Yes    Uncontrolled type 2 diabetes mellitus with hyperglycemia ([address] 8/31/2020 Yes    Cigarette nicotine dependence with nicotine-induced disorder 8/31/2020 Yes    Tobacco abuse counseling 8/31/2020 Yes    Personal history of noncompliance with medical treatment and regimen 8/31/2020 Yes                      Plan:        Abscess, gluteal, left  Sepsis  -Currently improving  -Evidenced by CT abdomen and pelvis 8/31  -Status post lancing, wound culture -few WBCs, few gram-positive cocci, additional incubation required  -Blood cultures in process  -Patient continues on vancomycin  -Lactic acid is normalized, continue with intravenous fluids  -Antiemetics PRN, pain control modalities  -General surgery/infectious disease consultations appreciate recommendation    Uncontrolled type 2 diabetes with hyperglycemia-chronic condition, hold oral anti-hypoglycemics, sliding scale insulin, Accu-Cheks q. ECHO scoliosis    Chronic Benign Essential Hypertension   - Stable   - Continue current medications   - PRN medications ordered   - Will continue to monitor     Dyslipidemia   - Controlled   - Continue Statin therapy   Lab Results   Component Value Date    CHOL 185 07/06/2020   ,   Lab Results   Component Value Date    HDL 36 (L) 07/06/2020     Lab Results   Component Value Date    LDLCALC 115 07/06/2020   ,   Lab Results   Component Value Date    TRIG 170 (H) 07/06/2020     Hepatic steatosis -evidenced by CT abdomen and pelvis, correlated by patient's obesity, hyperlipidemia, hypertension      Electronically signed by   Tiffany Harris   Internal Medicine Hospitalist  On 9/1/2020  At 10:35 AM    EMR Dragon/Transcription disclaimer:   Much of this encounter note is an electronic transcription/translation of spoken language to printed text.  The electronic translation of spoken language may permit erroneous, or at times, nonsensical words or phrases to be inadvertently transcribed; although attempts have made to review the note for such errors, some may still exist.

## 2020-09-01 NOTE — CONSULTS
[facility] ([place], [place]) SurgeryConsult Note    Patient ID: Jasmeet Piedra  38 y.o.  male  YOB: 1980    Admitting Diagnosis: Perianal abscess [K61.0]    Chief Complaint:  Chief Complaint   Patient presents with    Wound Infection     in gluteal fold       Subjective:    Mr. Bryn Rico is a 38 y.o. male who presented yesterday to the ED with complaints of pain on his butt. He states that about seven days ago the area started to be sore, but has worsened and now has some swelling to the area. He has had some subjective fever at home with pain and some mild drainage from the area. He has had these in the past. His BM's have been normal.  He has some trouble walking due to the pain. He denies chills, chest pain, SOB, n/v/d/c.       Past Medical History:   Diagnosis Date    Degenerative disc disease at L5-S1 level     Hyperlipidemia     Nerve pain     Type 2 diabetes mellitus without complication ([facility] [address]      Past Surgical History:   Procedure Laterality Date    INCISION AND DRAINAGE Right 7/7/2016    KNEE IRRIGATION AND DEBRIDEMENT  performed by Tereza Bernabe MD at [address] Right 5/31/2016    PERONEAL NERVE RELEASE ; RIGHT FIBULAR HEAD  performed by Tereza Bernabe MD at [address] [facility] OR     Current Facility-Administered Medications   Medication Dose Route Frequency Provider Last Rate Last Dose    nicotine (NICODERM CQ) 21 MG/24HR 1 patch  1 patch Transdermal Daily Elio Leiva MD   1 patch at 09/01/20 1241    glucose (GLUTOSE) 40 % oral gel 15 g  15 g Oral PRN Edward Palmer MD        dextrose 50 % IV solution  12.5 g Intravenous PRN Edward Palmer MD        glucagon (rDNA) injection 1 mg  1 mg Intramuscular PRN Edward Palmer MD        dextrose 5 % solution  100 mL/hr Intravenous PRN Edward Palmer MD        HYDROcodone-acetaminophen (NORCO) 7.5-325 MG per tablet 1 tablet  1 tablet Oral Q6H PRN Edward Palmer MD        vancomycin (VANCOCIN) 1,500 mg in dextrose 5 % 500 mL IVPB  1,500 mg Intravenous Q8H Waldemar Herndon  mL/hr at 09/01/20 1201 1,500 mg at 09/01/20 1201    vancomycin (VANCOCIN) intermittent dosing (placeholder)   Other RX Placeholder Waldemar Herndon MD        lactated ringers infusion 1,000 mL  1,000 mL Intravenous Once Waldemar Herndon MD        fluticasone (FLONASE) 50 MCG/ACT nasal spray 1 spray  1 spray Each Nostril Daily Waldemar Herndon MD   1 spray at 09/01/20 0851    cetirizine (ZYRTEC) tablet 10 mg  10 mg Oral Nightly Waldemar Herndon MD   10 mg at 08/31/20 2049    repaglinide (PRANDIN) tablet 1 mg  1 mg Oral TID AC Waldemar Herndon MD        gabapentin (NEURONTIN) capsule 300 mg  300 mg Oral TID Waldemar Herndon MD   300 mg at 09/01/20 0836    metFORMIN (GLUCOPHAGE) tablet 1,000 mg  1,000 mg Oral BID WC Waldemar Herndon MD   1,000 mg at 09/01/20 0836    aspirin EC tablet 81 mg  81 mg Oral Daily Waldemar Herndon MD   81 mg at 09/01/20 0836    glipiZIDE (GLUCOTROL) tablet 10 mg  10 mg Oral QAM AC Waldemar Herndon MD        sodium chloride flush 0.9 % injection 10 mL  10 mL Intravenous 2 times per day Waldemar Herndon MD   10 mL at 09/01/20 0852    sodium chloride flush 0.9 % injection 10 mL  10 mL Intravenous PRN Waldemar Herndon MD        potassium chloride (KLOR-CON M) extended release tablet 40 mEq  40 mEq Oral PRN Waldemar Herndon MD        Or    potassium bicarb-citric acid (EFFER-K) effervescent tablet 40 mEq  40 mEq Oral PRN Waldemar Herndon MD        Or    potassium chloride 10 mEq/100 mL IVPB (Peripheral Line)  10 mEq Intravenous PRN Waldemar Herndon MD        magnesium sulfate 1 g in dextrose 5% 100 mL IVPB  1 g Intravenous PRN Waldemar Herndon MD        acetaminophen (TYLENOL) tablet 650 mg  650 mg Oral Q6H PRN Waldemar Herndon MD   650 mg at 09/01/20 0847    Or    acetaminophen (TYLENOL) suppository 650 mg  650 mg Rectal Q6H PRN Waldemar Herndon MD        polyethylene glycol (GLYCOLAX) packet 17 g  17 g Oral Daily PRN Waldemar Herndon MD        promethazine (PHENERGAN) tablet 12.5 mg  12.5 mg Oral Q6H PRN Waldemar Herndon MD        Or    ondansetron (ZOFRAN) injection 4 mg  4 mg Intravenous Q6H PRN Waldemar Herndon MD        famotidine (PEPCID) tablet 20 mg  20 mg Oral BID Waldemar Herndon MD   20 mg at 09/01/20 0836    enoxaparin (LOVENOX) injection 40 mg  40 mg Subcutaneous Daily Waldemar Herndon MD   40 mg at 09/01/20 0835    insulin lispro (HUMALOG) injection vial 0-6 Units  0-6 Units Subcutaneous TID WC Waldemar Herndon MD   2 Units at 09/01/20 1229    insulin lispro (HUMALOG) injection vial 0-3 Units  0-3 Units Subcutaneous Nightly Waldemar Herndon MD         Allergies: Patient has no known allergies. Family History   Problem Relation Age of Onset    Other Mother         blockage    High Blood Pressure Father     Diabetes Father        Social History     Tobacco Use    Smoking status: Current Every Day Smoker     Packs/day: 1.00     Years: 20.00     Pack years: 20.00     Types: Cigarettes    Smokeless tobacco: Never Used   Substance Use Topics    Alcohol use: Yes     Comment: occ       Review of Systems   Constitutional: Positive for activity change, appetite change, fatigue and fever. Negative for unexpected weight change. HENT: Positive for postnasal drip and rhinorrhea. Negative for hearing loss, nosebleeds and sore throat. Eyes: Negative for pain, redness and visual disturbance. Respiratory: Positive for cough. Negative for chest tightness and shortness of breath. Cardiovascular: Negative for chest pain, palpitations and leg swelling. Gastrointestinal: Negative for abdominal distention, abdominal pain, constipation, diarrhea, nausea and vomiting. Endocrine: Negative for cold intolerance, heat intolerance and polydipsia. Genitourinary: Positive for frequency. Negative for difficulty urinating and urgency. Musculoskeletal: Positive for gait problem. Negative for back pain, joint swelling and neck pain. Skin: Positive for wound. Negative for color change and rash.    Allergic/Immunologic: Negative for environmental allergies and food allergies. Neurological: Negative for seizures, light-headedness and headaches. Hematological: Negative for adenopathy. Does not bruise/bleed easily. Psychiatric/Behavioral: Positive for dysphoric mood. Negative for confusion, sleep disturbance and suicidal ideas. The patient is nervous/anxious. Objective:   /62   Pulse 85   Temp 98 °F (36.7 °C) (Temporal)   Resp 18   Ht 6' 1\" (1.854 m)   Wt (!) 401 lb (181.9 kg)   SpO2 95%   BMI 52.91 kg/m²       Intake/Output Summary (Last 24 hours) at 9/1/2020 1325  Last data filed at 9/1/2020 0241  Gross per 24 hour   Intake 360 ml   Output 100 ml   Net 260 ml     Physical Exam  Vitals signs reviewed. Constitutional:       Appearance: He is well-developed. He is obese. HENT:      Head: Normocephalic and atraumatic. Eyes:      Pupils: Pupils are equal, round, and reactive to light. Neck:      Musculoskeletal: Normal range of motion and neck supple. Cardiovascular:      Rate and Rhythm: Normal rate and regular rhythm. Heart sounds: Normal heart sounds. Pulmonary:      Effort: Pulmonary effort is normal.      Breath sounds: Normal breath sounds. No wheezing or rales. Abdominal:      General: There is no distension. Palpations: Abdomen is soft. There is no mass. Tenderness: There is no abdominal tenderness. There is no guarding or rebound. Musculoskeletal: Normal range of motion. Lymphadenopathy:      Cervical: No cervical adenopathy. Skin:     General: Skin is warm and dry. Findings: Abscess (left gluteal abscess, drained with packing in place. Sero-purulent drainage. No odor.), acne and wound present. Comments: Erythematous changes around site of abscess; non-tender around anus. Neurological:      Mental Status: He is alert and oriented to person, place, and time. Psychiatric:         Behavior: Behavior normal.         Thought Content:  Thought content normal. Judgment: Judgment normal.         Data:  CBC:   Recent Labs     08/31/20  1350 09/01/20  1141   WBC 14.6* 12.0*   RBC 4.52* 4.47*   HGB 13.3* 13.1*   HCT 40.6* 39.6*   MCV 89.8 88.6   RDW 12.9 13.1    345     BMP:   Recent Labs     08/31/20  1241 08/31/20  1350   NA  --  141   K  --  4.2   CL  --  101   CO2  --  25   ANIONGAP  --  15   GLUCOSE 334 332*   CREATININE  --  0.7   LABGLOM  --  >60   CALCIUM  --  9.5     LFT:   Recent Labs     08/31/20  1350   PROT 7.5   LABALBU 3.6   BILITOT 0.5   ALKPHOS 71   ALT 41   AST 23       Assessment:       Abscess, gluteal, left    Type 2 diabetes mellitus with diabetic polyneuropathy, without long-term current use of insulin (HCC)    Morbid obesity with BMI of 50.0-59.9, adult ([facility] [address]    Uncontrolled type 2 diabetes mellitus with hyperglycemia (HCC)    Cigarette nicotine dependence with nicotine-induced disorder    Personal history of noncompliance with medical treatment and regimen    Plan:     Wound is open and draining nicely. Recommend daily or BID packing changes with saline irrigation. Mr. Migdalia Celestin is going to see the most improvement in his wounds from strict glucose control which we discussed. He does not require surgical intervention.       Electronically signed by Kaitlin Zhang DO on 0/9/3564 at 1:25 PM

## 2020-09-01 NOTE — PLAN OF CARE
Problem: Skin Integrity:  Goal: Will show no infection signs and symptoms  Description: Will show no infection signs and symptoms  9/1/2020 1733 by Marisela Rubio RN  Outcome: Ongoing  9/1/2020 0518 by John Maldonado RN  Outcome: Met This Shift  9/1/2020 0440 by Kim Casarez RN  Outcome: Ongoing  Goal: Absence of new skin breakdown  Description: Absence of new skin breakdown  9/1/2020 1733 by Marisela Rubio RN  Outcome: Ongoing  9/1/2020 0518 by John Maldonado RN  Outcome: Met This Shift  9/1/2020 0440 by Kim Casarez RN  Outcome: Ongoing

## 2020-09-02 LAB
ANION GAP SERPL CALCULATED.3IONS-SCNC: 13 MMOL/L (ref 7–19)
BUN BLDV-MCNC: 7 MG/DL (ref 6–20)
CALCIUM SERPL-MCNC: 8.8 MG/DL (ref 8.6–10)
CHLORIDE BLD-SCNC: 102 MMOL/L (ref 98–111)
CO2: 25 MMOL/L (ref 22–29)
CREAT SERPL-MCNC: 0.7 MG/DL (ref 0.5–1.2)
GFR AFRICAN AMERICAN: >59
GFR NON-AFRICAN AMERICAN: >60
GLUCOSE BLD-MCNC: 113 MG/DL (ref 70–99)
GLUCOSE BLD-MCNC: 182 MG/DL (ref 74–109)
GLUCOSE BLD-MCNC: 187 MG/DL (ref 70–99)
GLUCOSE BLD-MCNC: 210 MG/DL (ref 70–99)
GLUCOSE BLD-MCNC: 235 MG/DL (ref 70–99)
HCT VFR BLD CALC: 38.5 % (ref 42–52)
HEMOGLOBIN: 13 G/DL (ref 14–18)
MAGNESIUM: 2.2 MG/DL (ref 1.6–2.6)
MCH RBC QN AUTO: 29.4 PG (ref 27–31)
MCHC RBC AUTO-ENTMCNC: 33.8 G/DL (ref 33–37)
MCV RBC AUTO: 87.1 FL (ref 80–94)
PDW BLD-RTO: 12.9 % (ref 11.5–14.5)
PERFORMED ON: ABNORMAL
PLATELET # BLD: 359 K/UL (ref 130–400)
PMV BLD AUTO: 9.5 FL (ref 9.4–12.4)
POTASSIUM REFLEX MAGNESIUM: 3.5 MMOL/L (ref 3.5–5)
RBC # BLD: 4.42 M/UL (ref 4.7–6.1)
SODIUM BLD-SCNC: 140 MMOL/L (ref 136–145)
VANCOMYCIN TROUGH: 11 UG/ML (ref 10–20)
WBC # BLD: 10.8 K/UL (ref 4.8–10.8)

## 2020-09-02 PROCEDURE — 99231 SBSQ HOSP IP/OBS SF/LOW 25: CPT | Performed by: SURGERY

## 2020-09-02 PROCEDURE — 83735 ASSAY OF MAGNESIUM: CPT

## 2020-09-02 PROCEDURE — 80202 ASSAY OF VANCOMYCIN: CPT

## 2020-09-02 PROCEDURE — 6370000000 HC RX 637 (ALT 250 FOR IP): Performed by: FAMILY MEDICINE

## 2020-09-02 PROCEDURE — 6370000000 HC RX 637 (ALT 250 FOR IP): Performed by: HOSPITALIST

## 2020-09-02 PROCEDURE — 6360000002 HC RX W HCPCS: Performed by: PHYSICIAN ASSISTANT

## 2020-09-02 PROCEDURE — 2580000003 HC RX 258: Performed by: PHYSICIAN ASSISTANT

## 2020-09-02 PROCEDURE — 6360000002 HC RX W HCPCS: Performed by: INTERNAL MEDICINE

## 2020-09-02 PROCEDURE — 82947 ASSAY GLUCOSE BLOOD QUANT: CPT

## 2020-09-02 PROCEDURE — 80048 BASIC METABOLIC PNL TOTAL CA: CPT

## 2020-09-02 PROCEDURE — 36415 COLL VENOUS BLD VENIPUNCTURE: CPT

## 2020-09-02 PROCEDURE — 1210000000 HC MED SURG R&B

## 2020-09-02 PROCEDURE — 6360000002 HC RX W HCPCS: Performed by: HOSPITALIST

## 2020-09-02 PROCEDURE — 85027 COMPLETE CBC AUTOMATED: CPT

## 2020-09-02 PROCEDURE — 2580000003 HC RX 258: Performed by: INTERNAL MEDICINE

## 2020-09-02 RX ADMIN — VANCOMYCIN HYDROCHLORIDE 1500 MG: 10 INJECTION, POWDER, LYOPHILIZED, FOR SOLUTION INTRAVENOUS at 20:45

## 2020-09-02 RX ADMIN — FAMOTIDINE 20 MG: 20 TABLET ORAL at 07:39

## 2020-09-02 RX ADMIN — VANCOMYCIN HYDROCHLORIDE 1500 MG: 10 INJECTION, POWDER, LYOPHILIZED, FOR SOLUTION INTRAVENOUS at 07:39

## 2020-09-02 RX ADMIN — METFORMIN HYDROCHLORIDE 1000 MG: 500 TABLET ORAL at 07:39

## 2020-09-02 RX ADMIN — HYDROCODONE BITARTRATE AND ACETAMINOPHEN 1 TABLET: 7.5; 325 TABLET ORAL at 06:26

## 2020-09-02 RX ADMIN — METFORMIN HYDROCHLORIDE 1000 MG: 500 TABLET ORAL at 17:40

## 2020-09-02 RX ADMIN — GABAPENTIN 300 MG: 300 CAPSULE ORAL at 20:44

## 2020-09-02 RX ADMIN — INSULIN LISPRO 2 UNITS: 100 INJECTION, SOLUTION INTRAVENOUS; SUBCUTANEOUS at 13:27

## 2020-09-02 RX ADMIN — PIPERACILLIN SODIUM AND TAZOBACTAM SODIUM 4.5 G: 4; .5 INJECTION, POWDER, LYOPHILIZED, FOR SOLUTION INTRAVENOUS at 06:36

## 2020-09-02 RX ADMIN — REPAGLINIDE 1 MG: 0.5 TABLET ORAL at 11:11

## 2020-09-02 RX ADMIN — VANCOMYCIN HYDROCHLORIDE 1500 MG: 10 INJECTION, POWDER, LYOPHILIZED, FOR SOLUTION INTRAVENOUS at 13:27

## 2020-09-02 RX ADMIN — FLUTICASONE PROPIONATE 1 SPRAY: 50 SPRAY, METERED NASAL at 07:39

## 2020-09-02 RX ADMIN — GABAPENTIN 300 MG: 300 CAPSULE ORAL at 16:09

## 2020-09-02 RX ADMIN — CETIRIZINE HYDROCHLORIDE 10 MG: 10 TABLET, FILM COATED ORAL at 20:44

## 2020-09-02 RX ADMIN — HYDROCODONE BITARTRATE AND ACETAMINOPHEN 1 TABLET: 7.5; 325 TABLET ORAL at 12:03

## 2020-09-02 RX ADMIN — ASPIRIN 81 MG: 81 TABLET, COATED ORAL at 07:39

## 2020-09-02 RX ADMIN — GABAPENTIN 300 MG: 300 CAPSULE ORAL at 07:39

## 2020-09-02 RX ADMIN — FAMOTIDINE 20 MG: 20 TABLET ORAL at 20:44

## 2020-09-02 RX ADMIN — HYDROCODONE BITARTRATE AND ACETAMINOPHEN 1 TABLET: 7.5; 325 TABLET ORAL at 18:41

## 2020-09-02 RX ADMIN — ENOXAPARIN SODIUM 40 MG: 100 INJECTION SUBCUTANEOUS at 07:39

## 2020-09-02 RX ADMIN — REPAGLINIDE 1 MG: 0.5 TABLET ORAL at 06:25

## 2020-09-02 RX ADMIN — INSULIN LISPRO 1 UNITS: 100 INJECTION, SOLUTION INTRAVENOUS; SUBCUTANEOUS at 07:43

## 2020-09-02 RX ADMIN — REPAGLINIDE 1 MG: 0.5 TABLET ORAL at 16:10

## 2020-09-02 RX ADMIN — INSULIN LISPRO 1 UNITS: 100 INJECTION, SOLUTION INTRAVENOUS; SUBCUTANEOUS at 22:03

## 2020-09-02 RX ADMIN — PIPERACILLIN SODIUM AND TAZOBACTAM SODIUM 4.5 G: 4; .5 INJECTION, POWDER, LYOPHILIZED, FOR SOLUTION INTRAVENOUS at 16:09

## 2020-09-02 RX ADMIN — VANCOMYCIN HYDROCHLORIDE 1500 MG: 10 INJECTION, POWDER, LYOPHILIZED, FOR SOLUTION INTRAVENOUS at 00:11

## 2020-09-02 ASSESSMENT — PAIN SCALES - GENERAL
PAINLEVEL_OUTOF10: 5
PAINLEVEL_OUTOF10: 8
PAINLEVEL_OUTOF10: 7
PAINLEVEL_OUTOF10: 4
PAINLEVEL_OUTOF10: 10

## 2020-09-02 NOTE — PROGRESS NOTES
Pharmacy Vancomycin Consult     Vancomycin Day: 3  Current Dosin mg IV Q 6 hours    Temp max:  98    Recent Labs     20  1141 20  0615   BUN 12 7       Recent Labs     20  1141 20  0615   CREATININE 0.6 0.7       Recent Labs     20  1141 20  0615   WBC 12.0* 10.8         Intake/Output Summary (Last 24 hours) at 2020 0728  Last data filed at 2020 1825  Gross per 24 hour   Intake 685.22 ml   Output 250 ml   Net 435.22 ml     Culture Date Source Results   20 blood No growth   20 Wound-buttock Few Gm + cocci in pairs                Ht Readings from Last 1 Encounters:   20 6' 1\" (1.854 m)        Wt Readings from Last 1 Encounters:   20 (!) 401 lb (181.9 kg)         Body mass index is 52.91 kg/m². Estimated Creatinine Clearance: 237 mL/min (based on SCr of 0.7 mg/dL). Trough: 11    Assessment/Plan: Level good. Continue Vancomycin 1500 mg IV Q 6 hours.     Electronically signed by CHRISTIAN Fang [facility] on 2020 at 7:28 AM

## 2020-09-02 NOTE — PROGRESS NOTES
[facility] Surgery Progress Note    Chief Complaint:   Chief Complaint   Patient presents with    Wound Infection     in gluteal fold       SUBJECTIVE:  Mr. Angela Abdi is a 38 y.o. male is seen and examined at bedside. He complains of pain at the area that is worse with ambulation. He has showered and had the wounds cleaned earlier today. He is afebrile and tolerating his diet. He denies fever, chills, chest pain, SOB, n/v/d/c.      OBJECTIVE:  /75   Pulse 88   Temp 96.6 °F (35.9 °C) (Temporal)   Resp 18   Ht 6' 1\" (1.854 m)   Wt (!) 401 lb (181.9 kg)   SpO2 94%   BMI 52.91 kg/m²   CONSTITUTIONAL: Alert, appropriate, no acute distress  SKIN: warm, dry with no rashes or lesions  HEENT: NCAT, Non icteric, PERR. Trachea Midline. CHEST/LUNGS: CTA bilaterally. Normal respiratory effort. CARDIOVASCULAR: RRR, No edema. ABDOMEN: soft, ND, appropriately TTP, +BS. Continued drainage from openings, induration present. Wounds cleansed and packed. NEUROLOGIC: CN II-XI grossly intact, no motor or sensory deficits   MUSCULOSKELETAL: No clubbing or cyanosis. PSYCHIATRIC:  Normal Mood and Affect. Alert and Oriented.     Labs:  CBC:   Recent Labs     08/31/20  1350 09/01/20  1141 09/02/20  0615   WBC 14.6* 12.0* 10.8   HGB 13.3* 13.1* 13.0*    345 359     BMP:    Recent Labs     08/31/20  1350 09/01/20  1141 09/02/20  0615    137 140   K 4.2 3.9 3.5    99 102   CO2 25 27 25   BUN 9 12 7   CREATININE 0.7 0.6 0.7   GLUCOSE 332* 206* 182*       ASSESSMENT:  Principal Problem:    Abscess, gluteal, left    Type 2 diabetes mellitus with diabetic polyneuropathy, without long-term current use of insulin (HCC)    Morbid obesity with BMI of 50.0-59.9, adult ([facility])    Uncontrolled type 2 diabetes mellitus with hyperglycemia ([facility])    Cigarette nicotine dependence with nicotine-induced disorder    Tobacco abuse counseling    Personal history of noncompliance with medical treatment and regimen    PLAN:  Continue daily or BID packing changes and irrigation to the area. Continue Antibiotics. WBC improving. Strict glucose control. Smoking cessation.        Preethi Cueto DO   Electronically Signed on 9/2/2020 at 11:49 AM

## 2020-09-02 NOTE — PROGRESS NOTES
Pharmacy Vancomycin Consult     Vancomycin Day: 2  Current Dosinmg IV q8hr    Temp max:  98    Recent Labs     20  1350 20  1141   BUN 9 12       Recent Labs     20  1350 20  1141   CREATININE 0.7 0.6       Recent Labs     20  1350 20  1141   WBC 14.6* 12.0*         Intake/Output Summary (Last 24 hours) at 2020 1858  Last data filed at 2020 1825  Gross per 24 hour   Intake 565.22 ml   Output 350 ml   Net 215.22 ml       Culture Date Source Results   20 blood No growth   20 Wound-buttock Few Gm + cocci in pairs            Ht Readings from Last 1 Encounters:   20 6' 1\" (1.854 m)        Wt Readings from Last 1 Encounters:   20 (!) 401 lb (181.9 kg)         Body mass index is 52.91 kg/m². Estimated Creatinine Clearance: 277 mL/min (based on SCr of 0.6 mg/dL). Trough: 11.1 drawn at 5.5 hrs from dose--actual level would be around 7.2    Assessment/Plan: Adjust Vancomycin to 1500mg IV q6hr. Vancomycin trough level on 3rd dose.     Electronically signed by Ranee Fothergill, [facility] on 2020 at 6:58 PM

## 2020-09-02 NOTE — PROGRESS NOTES
Progress Note    Date:9/2/2020       Room:0511/511-01  Patient Name:Pérez Pinedo     YOB: 1980     Age:40 y.o. Subjective   Interval History Status: improved. Patient admitted 8/31 from the emergency room with complaints of worsening swelling of the left buttock region painful to touch as well as fluctuant. Patient underwent lancing of the area in the emergency room with wound cultures. General surgery/infectious diseasefollowing, patient on vancomycin and Zosyn, currently awaiting finalization of wound culture for antibiotic regimen by ID. Seen this morning in the room with family member present. Denied any acute overnight event. Have some associated pain in buttock area but well controlled with pain management. Denied any chest pain or shortness of breath. Tolerating dressing changes without overt uncontrolled pain. Review of Systems   Review of Systems  12 point system reviewed and negative except as stated above.     Medications   Scheduled Meds:    piperacillin-tazobactam  4.5 g Intravenous Q8H    nicotine  1 patch Transdermal Daily    vancomycin  1,500 mg Intravenous Q6H    vancomycin (VANCOCIN) intermittent dosing (placeholder)   Other RX Placeholder    fluticasone  1 spray Each Nostril Daily    cetirizine  10 mg Oral Nightly    repaglinide  1 mg Oral TID AC    gabapentin  300 mg Oral TID    metFORMIN  1,000 mg Oral BID WC    aspirin EC  81 mg Oral Daily    glipiZIDE  10 mg Oral QAM AC    sodium chloride flush  10 mL Intravenous 2 times per day    famotidine  20 mg Oral BID    enoxaparin  40 mg Subcutaneous Daily    insulin lispro  0-6 Units Subcutaneous TID WC    insulin lispro  0-3 Units Subcutaneous Nightly     Continuous Infusions:    dextrose      lactated ringers       PRN Meds: glucose, dextrose, glucagon (rDNA), dextrose, HYDROcodone-acetaminophen, sodium chloride flush, potassium chloride **OR** potassium alternative oral replacement **OR** potassium chloride, magnesium sulfate, acetaminophen **OR** acetaminophen, polyethylene glycol, promethazine **OR** ondansetron    Past History    Past Medical History:   has a past medical history of Degenerative disc disease at L5-S1 level, Hyperlipidemia, Nerve pain, and Type 2 diabetes mellitus without complication ([address] Social History:   reports that he has been smoking cigarettes. He has a 20.00 pack-year smoking history. He has never used smokeless tobacco. He reports current alcohol use. He reports that he does not use drugs. Family History:   Family History   Problem Relation Age of Onset    Other Mother         blockage    High Blood Pressure Father     Diabetes Father        Physical Examination      Vitals:  /71   Pulse 94   Temp 96.9 °F (36.1 °C) (Temporal)   Resp 18   Ht 6' 1\" (1.854 m)   Wt (!) 401 lb (181.9 kg)   SpO2 93%   BMI 52.91 kg/m²   Temp (24hrs), Av.1 °F (36.2 °C), Min:96.6 °F (35.9 °C), Max:98.1 °F (36.7 °C)      I/O (24Hr): Intake/Output Summary (Last 24 hours) at 2020 1448  Last data filed at 2020 1254  Gross per 24 hour   Intake 1085.22 ml   Output 1050 ml   Net 35.22 ml       Physical Exam  General: Morbid obesity, no acute distress lying comfortably in bed. HEENT: Atraumatic normocephalic  Cardiac: Normal S1-S2 no murmurs rub or gallop. Respiratory: clear To auscultation bilaterally, no rhonchi or rales  Abdomen: Truncal obesity, nontender to palpation, no organomegaly noted. : Left dressing intact, no drainage noted. (Dressing just changed) no overt erythema noted.   Extremities: no tenderness, no edema, moves all extremities  Psych: Affect normal and good eye contact      Labs/Imaging/Diagnostics   Labs:  CBC:  Recent Labs     20  1350 20  1141 20  0615   WBC 14.6* 12.0* 10.8   RBC 4.52* 4.47* 4.42*   HGB 13.3* 13.1* 13.0*   HCT 40.6* 39.6* 38.5*   MCV 89.8 88.6 87.1   RDW 12.9 13.1 12.9    345 359 CHEMISTRIES:  Recent Labs     08/31/20  1350 09/01/20  1141 09/02/20  0615    137 140   K 4.2 3.9 3.5    99 102   CO2 25 27 25   BUN 9 12 7   CREATININE 0.7 0.6 0.7   GLUCOSE 332* 206* 182*   PHOS  --  2.8  --    MG  --  2.4 2.2     PT/INR:No results for input(s): PROTIME, INR in the last 72 hours. APTT:No results for input(s): APTT in the last 72 hours. LIVER PROFILE:  Recent Labs     08/31/20  1350   AST 23   ALT 41   BILITOT 0.5   ALKPHOS 71       Imaging Last 24 Hours:  Ct Abdomen Pelvis W Iv Contrast Additional Contrast? None    Result Date: 8/31/2020  Exam: CT ABDOMEN PELVIS W IV CONTRAST - 8/31/2020 2:00 PM Indication: Concern for tracking of abscess, LEFT buttock boil Comparison: None available. DLP: 2324 mGy cm. In order to have a CT radiation dose as low as reasonably achievable, Automated Exposure Control was utilized for adjustment of the mA and/or KV according to patient size. Findings: Subtle tissue inflammation in the medial LEFT gluteal soft tissues extending into the LEFT perianal region. No fistulous tract identified although inflammation does abut the LEFT posterior aspect of the anus. No evidence of tracking into the perirectal soft tissues or rectum. No large organized fluid collection/abscess. Clear lung bases. The liver is steatotic. No gallstones or gallbladder wall thickening. No biliary ductal dilatation. Small calcification the pancreatic tail may be related to prior insult. The spleen and adrenal glands appear unremarkable. No urolithiasis or hydronephrosis. No solid renal mass. Urinary bladder appears unremarkable. Colonic diverticulum are noted. There is no evidence of bowel obstruction or active bowel inflammation. A normal appendix is identified. No ascites or free pelvic fluid. No pelvic mass or pelvic collection. Normal caliber abdominal aorta. No enlarged retroperitoneal, mesenteric, pelvic, or inguinal lymph nodes. No aggressive bone lesions identified.     1. LEFT gluteal soft tissue inflammation extending to the LEFT posterior anus. No visible fistulous tract or large abscess. No evidence of deep extension into the perirectal soft tissues or involvement of the rectum. 2.  The liver is steatotic.  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 8/31/2020 Yes    Generalized anxiety disorder 8/31/2020 Yes    Psychophysiological insomnia 8/31/2020 Yes    Hyperlipidemia associated with type 2 diabetes mellitus ([address] 8/31/2020 Yes    Type 2 diabetes mellitus with diabetic polyneuropathy, without long-term current use of insulin ([address] 8/31/2020 Yes    Morbid obesity with BMI of 50.0-59.9, adult ([address] 8/31/2020 Yes    Sepsis ([address] 8/31/2020 Yes    Uncontrolled type 2 diabetes mellitus with hyperglycemia ([facility] [address] 8/31/2020 Yes    Cigarette nicotine dependence with nicotine-induced disorder 8/31/2020 Yes    Tobacco abuse counseling 8/31/2020 Yes    Personal history of noncompliance with medical treatment and regimen 8/31/2020 Yes                      Plan:          Abscess, gluteal, left  Sepsis; now resolved  -Currently improving  -Evidenced by CT abdomen and pelvis 8/31  -Status post lancing, wound culture -few WBCs, few gram-positive cocci,  awaiting finalization of organism  -Blood cultures no growth to date  -Patient continues on vancomycin and Zosyn  -Antiemetics PRN, pain control modalities  -General surgery/infectious disease; appreciate recommendation     Uncontrolled type 2 diabetes with hyperglycemia-chronic condition, hold oral anti-hypoglycemics, sliding scale insulin, Accu-Cheks q.      Chronic Benign Essential Hypertension   - Continue current medications   - PRN medications ordered   - Will continue to monitor      Dyslipidemia   - Continue Statin therapy     Hepatic steatosis -evidenced by CT abdomen and pelvis, correlated by patient's obesity, hyperlipidemia, hypertension        Code: Full  DVT prophylaxis: Enoxaparin  Diet: Diabetic  Disposition: Pending finalization of wound culture for proper antibiotic regimen on discharge.       Electronically signed by hCoco Campos MD on 9/2/20 at 2:48 PM CDT

## 2020-09-02 NOTE — CONSULTS
Comprehensive Nutrition Assessment    Type and Reason for Visit:  Initial, Consult    Nutrition Recommendations/Plan: continue with encouragement to modify current dietary habits    Nutrition Assessment:  Pt is well nourished AEB fat and muscle mass. Pt is not at risk for nutritional compromise. However pt is in need of additional nutrients d/t perianal abcess. Received consult for DM education. Information given with explanations to pt and Mom. Pt listened, but answers most statements with I already do that. Aware  & has refrig in cab. Discussed things he could put in there like SF coffee creamer. Discussed grams of CHO per meal--understood. Will continue to encourage adherance    Malnutrition Assessment:  Malnutrition Status:  No malnutrition    Context:  Acute Illness     Findings of the 6 clinical characteristics of malnutrition:  Energy Intake:  No significant decrease in energy intake  Weight Loss:  No significant weight loss     Body Fat Loss:  No significant body fat loss     Muscle Mass Loss:  No significant muscle mass loss    Fluid Accumulation:  No significant fluid accumulation Extremities   Strength:  Not Performed    Nutrition Related Findings:  well nourished-morbidly obese      Wounds:  Open Wounds, Skin Tears(perianal abcess)       Current Nutrition Therapies:    DIET CARB CONTROL;     Anthropometric Measures:  · Height: 6' 1\" (185.4 cm)  · Current Body Weight: 401 lb (181.9 kg)   · Admission Body Weight: 401 lb (181.9 kg)(stated)    · Usual Body Weight:       · Ideal Body Weight: 184 lbs; % Ideal Body Weight     · BMI: 52.9  · Adjusted Body Weight:  ; No Adjustment   · BMI Categories: Obese Class 3 (BMI 40.0 or greater)       Nutrition Diagnosis:   · Increased nutrient needs related to increase demand for energy/nutrients as evidenced by wounds      Nutrition Interventions:   Food and/or Nutrient Delivery:  Continue Current Diet, Start Oral Nutrition Supplement  Nutrition Education/Counseling:  Education completed, Counseling completed   Coordination of Nutrition Care:  Continued Inpatient Monitoring    Goals:  po intake 75% or greater. Weight decrease 1-3# per week.   Accuchek's/Glucose level 150 or less       Nutrition Monitoring and Evaluation:   Behavioral-Environmental Outcomes:  Readiness for Change   Food/Nutrient Intake Outcomes:  Food and Nutrient Intake, Supplement Intake  Physical Signs/Symptoms Outcomes:  Biochemical Data, Nutrition Focused Physical Findings, Skin, Weight     Discharge Planning:    Continue current diet     Electronically signed by Amanad Claros, MS, RD, LD on 9/2/20 at 12:27 PM CDT    Contact: [phone]

## 2020-09-02 NOTE — PLAN OF CARE
Problem: Skin Integrity:  Goal: Will show no infection signs and symptoms  Description: Will show no infection signs and symptoms  9/2/2020 0136 by Jaquelin Trujillo RN  Outcome: Ongoing  9/1/2020 1733 by Azar Rios RN  Outcome: Ongoing  Goal: Absence of new skin breakdown  Description: Absence of new skin breakdown  9/2/2020 0136 by Jaquelin Trujillo RN  Outcome: Ongoing  9/1/2020 1733 by Azar Rios RN  Outcome: Ongoing     Problem: Pain:  Goal: Pain level will decrease  Description: Pain level will decrease  Outcome: Ongoing  Goal: Control of acute pain  Description: Control of acute pain  Outcome: Ongoing  Goal: Control of chronic pain  Description: Control of chronic pain  Outcome: Ongoing     Problem: Falls - Risk of:  Goal: Will remain free from falls  Description: Will remain free from falls  Outcome: Ongoing  Goal: Absence of physical injury  Description: Absence of physical injury  Outcome: Ongoing

## 2020-09-02 NOTE — PROGRESS NOTES
Infectious Diseases Progress Note    Patient:  Sejal Garrido  YOB: 1980  MRN: [id number]   Admit date: 8/31/2020   Admitting Physician: Dhara Sandhu MD  Primary Care Physician: Anshul Vega MD    Chief Complaint/Interval History: He is without new symptoms. Talked with him about getting up to shower chair. He is agreeable. He has had some ongoing drainage. No new symptoms.     In/Out    Intake/Output Summary (Last 24 hours) at 9/2/2020 0759  Last data filed at 9/1/2020 1825  Gross per 24 hour   Intake 685.22 ml   Output 250 ml   Net 435.22 ml     Allergies: No Known Allergies  Current Meds: nicotine (NICODERM CQ) 21 MG/24HR 1 patch, Daily  glucose (GLUTOSE) 40 % oral gel 15 g, PRN  dextrose 50 % IV solution, PRN  glucagon (rDNA) injection 1 mg, PRN  dextrose 5 % solution, PRN  HYDROcodone-acetaminophen (NORCO) 7.5-325 MG per tablet 1 tablet, Q6H PRN  vancomycin (VANCOCIN) 1,500 mg in dextrose 5 % 500 mL IVPB, Q6H  piperacillin-tazobactam (ZOSYN) 4.5 g in dextrose 5 % 100 mL IVPB (mini-bag), Q8H  vancomycin (VANCOCIN) intermittent dosing (placeholder), RX Placeholder  lactated ringers infusion 1,000 mL, Once  fluticasone (FLONASE) 50 MCG/ACT nasal spray 1 spray, Daily  cetirizine (ZYRTEC) tablet 10 mg, Nightly  repaglinide (PRANDIN) tablet 1 mg, TID AC  gabapentin (NEURONTIN) capsule 300 mg, TID  metFORMIN (GLUCOPHAGE) tablet 1,000 mg, BID WC  aspirin EC tablet 81 mg, Daily  glipiZIDE (GLUCOTROL) tablet 10 mg, QAM AC  sodium chloride flush 0.9 % injection 10 mL, 2 times per day  sodium chloride flush 0.9 % injection 10 mL, PRN  potassium chloride (KLOR-CON M) extended release tablet 40 mEq, PRN    Or  potassium bicarb-citric acid (EFFER-K) effervescent tablet 40 mEq, PRN    Or  potassium chloride 10 mEq/100 mL IVPB (Peripheral Line), PRN  magnesium sulfate 1 g in dextrose 5% 100 mL IVPB, PRN  acetaminophen (TYLENOL) tablet 650 mg, Q6H PRN    Or  acetaminophen (TYLENOL) suppository 650 mg, Q6H PRN  polyethylene glycol (GLYCOLAX) packet 17 g, Daily PRN  promethazine (PHENERGAN) tablet 12.5 mg, Q6H PRN    Or  ondansetron (ZOFRAN) injection 4 mg, Q6H PRN  famotidine (PEPCID) tablet 20 mg, BID  enoxaparin (LOVENOX) injection 40 mg, Daily  insulin lispro (HUMALOG) injection vial 0-6 Units, TID WC  insulin lispro (HUMALOG) injection vial 0-3 Units, Nightly      Review of Systems no cardiopulmonary complaints    VitalSigns:  /71   Pulse 84   Temp 96.7 °F (35.9 °C) (Temporal)   Resp 18   Ht 6' 1\" (1.854 m)   Wt (!) 401 lb (181.9 kg)   SpO2 97%   BMI 52.91 kg/m²      Physical Exam  Buttock area slightly less induration. Ongoing purulent drainage. Lab Results:  CBC:   Recent Labs     08/31/20  1350 09/01/20  1141 09/02/20  0615   WBC 14.6* 12.0* 10.8   HGB 13.3* 13.1* 13.0*    345 359     BMP:  Recent Labs     08/31/20  1350 09/01/20  1141 09/02/20  0615    137 140   K 4.2 3.9 3.5    99 102   CO2 25 27 25   BUN 9 12 7   CREATININE 0.7 0.6 0.7   GLUCOSE 332* 206* 182*     CultureResults: Cultures staph aureus and beta strep.     Radiology: None    Additional Studies Reviewed:  None    Impression:  Buttock abscess/carbuncle-staph aureus and beta strep  Diabetes mellitus  Obesity  Tobacco use    Recommendations:  Continue vancomycin and Zosyn  Await susceptibility testing  Continue packing  Feel he would benefit from being up to shower chair facilitate removal of any crusting and ongoing drainage     Graciela Paul MD

## 2020-09-02 NOTE — CONSULTS
INFECTIOUS DISEASES CONSULT NOTE    Patient:  Modesta Aguilera 38 y.o. male  ROOM # [unfilled]  YOB: 1980  MRN: [id number]  CSN:  [id number]  Admit date: 8/31/2020   Admitting Physician: Shyla Burk MD  Primary Care Physician: Fernando Guajardo MD  REFERRING PROVIDER: No ref. provider found    Reason for Consultation: Perianal abscess with sepsis. History of Present Illness/Chief Complaint: 79-year-old man. He indicates he developed soreness superior gluteal cleft area. Indicates there was some tracking of the discomfort more posteriorly toward the anal area. Indicates symptoms started about 5 to 7 days ago and have been progressive. He presented to the  emergency room. He has had drainage from at least 2 open areas in the left medial buttock area. He has been on antibiotic treatment. Infectious disease asked to evaluate and offer recommendations.     Current Scheduled Medications:    nicotine  1 patch Transdermal Daily    [START ON 9/2/2020] vancomycin  1,500 mg Intravenous Q6H    vancomycin (VANCOCIN) intermittent dosing (placeholder)   Other RX Placeholder    fluticasone  1 spray Each Nostril Daily    cetirizine  10 mg Oral Nightly    repaglinide  1 mg Oral TID AC    gabapentin  300 mg Oral TID    metFORMIN  1,000 mg Oral BID WC    aspirin EC  81 mg Oral Daily    glipiZIDE  10 mg Oral QAM AC    sodium chloride flush  10 mL Intravenous 2 times per day    famotidine  20 mg Oral BID    enoxaparin  40 mg Subcutaneous Daily    insulin lispro  0-6 Units Subcutaneous TID WC    insulin lispro  0-3 Units Subcutaneous Nightly     Current PRN Medications:  glucose, dextrose, glucagon (rDNA), dextrose, HYDROcodone-acetaminophen, sodium chloride flush, potassium chloride **OR** potassium alternative oral replacement **OR** potassium chloride, magnesium sulfate, acetaminophen **OR** acetaminophen, polyethylene glycol, promethazine **OR** ondansetron    Allergies:  No Known Allergies    Past of deep extension into the perirectal soft tissues or    involvement of the rectum. 2.  The liver is steatotic.     Signed by Dr Elijah Quan on 8/31/2020 3:39 PM       Additional Studies Reviewed:     Impression:   Buttock abscess/carbuncle  Diabetes mellitus  Obesity  Tobacco use    Recommendations:    Continue vancomycin  Add Zosyn  Continue packing  Await culture results    Kip De La Cruz MD  09/01/20  9:54 PM

## 2020-09-03 LAB
ANION GAP SERPL CALCULATED.3IONS-SCNC: 14 MMOL/L (ref 7–19)
BUN BLDV-MCNC: 12 MG/DL (ref 6–20)
CALCIUM SERPL-MCNC: 9.3 MG/DL (ref 8.6–10)
CHLORIDE BLD-SCNC: 105 MMOL/L (ref 98–111)
CO2: 23 MMOL/L (ref 22–29)
CREAT SERPL-MCNC: 0.9 MG/DL (ref 0.5–1.2)
GFR AFRICAN AMERICAN: >59
GFR NON-AFRICAN AMERICAN: >60
GLUCOSE BLD-MCNC: 145 MG/DL (ref 70–99)
GLUCOSE BLD-MCNC: 166 MG/DL (ref 70–99)
GLUCOSE BLD-MCNC: 171 MG/DL (ref 74–109)
GLUCOSE BLD-MCNC: 172 MG/DL (ref 70–99)
GLUCOSE BLD-MCNC: 190 MG/DL (ref 70–99)
HCT VFR BLD CALC: 39 % (ref 42–52)
HEMOGLOBIN: 12.7 G/DL (ref 14–18)
MAGNESIUM: 2.2 MG/DL (ref 1.6–2.6)
MCH RBC QN AUTO: 29.5 PG (ref 27–31)
MCHC RBC AUTO-ENTMCNC: 32.6 G/DL (ref 33–37)
MCV RBC AUTO: 90.5 FL (ref 80–94)
PDW BLD-RTO: 12.9 % (ref 11.5–14.5)
PERFORMED ON: ABNORMAL
PLATELET # BLD: 362 K/UL (ref 130–400)
PMV BLD AUTO: 9.6 FL (ref 9.4–12.4)
POTASSIUM REFLEX MAGNESIUM: 3.5 MMOL/L (ref 3.5–5)
RBC # BLD: 4.31 M/UL (ref 4.7–6.1)
SODIUM BLD-SCNC: 142 MMOL/L (ref 136–145)
VANCOMYCIN TROUGH: 31.1 UG/ML (ref 10–20)
WBC # BLD: 10.3 K/UL (ref 4.8–10.8)

## 2020-09-03 PROCEDURE — 6370000000 HC RX 637 (ALT 250 FOR IP): Performed by: FAMILY MEDICINE

## 2020-09-03 PROCEDURE — 85027 COMPLETE CBC AUTOMATED: CPT

## 2020-09-03 PROCEDURE — 6360000002 HC RX W HCPCS: Performed by: HOSPITALIST

## 2020-09-03 PROCEDURE — 6370000000 HC RX 637 (ALT 250 FOR IP): Performed by: HOSPITALIST

## 2020-09-03 PROCEDURE — 2580000003 HC RX 258: Performed by: PHYSICIAN ASSISTANT

## 2020-09-03 PROCEDURE — 80048 BASIC METABOLIC PNL TOTAL CA: CPT

## 2020-09-03 PROCEDURE — 83735 ASSAY OF MAGNESIUM: CPT

## 2020-09-03 PROCEDURE — 80202 ASSAY OF VANCOMYCIN: CPT

## 2020-09-03 PROCEDURE — 2580000003 HC RX 258: Performed by: INTERNAL MEDICINE

## 2020-09-03 PROCEDURE — 82947 ASSAY GLUCOSE BLOOD QUANT: CPT

## 2020-09-03 PROCEDURE — 36415 COLL VENOUS BLD VENIPUNCTURE: CPT

## 2020-09-03 PROCEDURE — 6360000002 HC RX W HCPCS: Performed by: INTERNAL MEDICINE

## 2020-09-03 PROCEDURE — 99231 SBSQ HOSP IP/OBS SF/LOW 25: CPT | Performed by: SURGERY

## 2020-09-03 PROCEDURE — 6360000002 HC RX W HCPCS: Performed by: PHYSICIAN ASSISTANT

## 2020-09-03 PROCEDURE — 1210000000 HC MED SURG R&B

## 2020-09-03 RX ORDER — HYDROCODONE BITARTRATE AND ACETAMINOPHEN 7.5; 325 MG/1; MG/1
1 TABLET ORAL EVERY 4 HOURS PRN
Status: DISCONTINUED | OUTPATIENT
Start: 2020-09-03 | End: 2020-09-05 | Stop reason: HOSPADM

## 2020-09-03 RX ADMIN — VANCOMYCIN HYDROCHLORIDE 1500 MG: 10 INJECTION, POWDER, LYOPHILIZED, FOR SOLUTION INTRAVENOUS at 02:58

## 2020-09-03 RX ADMIN — GABAPENTIN 300 MG: 300 CAPSULE ORAL at 08:07

## 2020-09-03 RX ADMIN — VANCOMYCIN HYDROCHLORIDE 1500 MG: 10 INJECTION, POWDER, LYOPHILIZED, FOR SOLUTION INTRAVENOUS at 08:07

## 2020-09-03 RX ADMIN — METFORMIN HYDROCHLORIDE 1000 MG: 500 TABLET ORAL at 16:42

## 2020-09-03 RX ADMIN — HYDROCODONE BITARTRATE AND ACETAMINOPHEN 1 TABLET: 7.5; 325 TABLET ORAL at 03:39

## 2020-09-03 RX ADMIN — HYDROCODONE BITARTRATE AND ACETAMINOPHEN 1 TABLET: 7.5; 325 TABLET ORAL at 18:31

## 2020-09-03 RX ADMIN — INSULIN LISPRO 1 UNITS: 100 INJECTION, SOLUTION INTRAVENOUS; SUBCUTANEOUS at 08:07

## 2020-09-03 RX ADMIN — VANCOMYCIN HYDROCHLORIDE 1500 MG: 10 INJECTION, POWDER, LYOPHILIZED, FOR SOLUTION INTRAVENOUS at 14:28

## 2020-09-03 RX ADMIN — ENOXAPARIN SODIUM 40 MG: 100 INJECTION SUBCUTANEOUS at 09:43

## 2020-09-03 RX ADMIN — FLUTICASONE PROPIONATE 1 SPRAY: 50 SPRAY, METERED NASAL at 09:44

## 2020-09-03 RX ADMIN — PIPERACILLIN SODIUM AND TAZOBACTAM SODIUM 4.5 G: 4; .5 INJECTION, POWDER, LYOPHILIZED, FOR SOLUTION INTRAVENOUS at 23:08

## 2020-09-03 RX ADMIN — PIPERACILLIN SODIUM AND TAZOBACTAM SODIUM 4.5 G: 4; .5 INJECTION, POWDER, LYOPHILIZED, FOR SOLUTION INTRAVENOUS at 00:01

## 2020-09-03 RX ADMIN — PIPERACILLIN SODIUM AND TAZOBACTAM SODIUM 4.5 G: 4; .5 INJECTION, POWDER, LYOPHILIZED, FOR SOLUTION INTRAVENOUS at 17:57

## 2020-09-03 RX ADMIN — INSULIN LISPRO 1 UNITS: 100 INJECTION, SOLUTION INTRAVENOUS; SUBCUTANEOUS at 13:38

## 2020-09-03 RX ADMIN — METFORMIN HYDROCHLORIDE 1000 MG: 500 TABLET ORAL at 08:07

## 2020-09-03 RX ADMIN — ASPIRIN 81 MG: 81 TABLET, COATED ORAL at 09:43

## 2020-09-03 RX ADMIN — FAMOTIDINE 20 MG: 20 TABLET ORAL at 20:22

## 2020-09-03 RX ADMIN — INSULIN LISPRO 1 UNITS: 100 INJECTION, SOLUTION INTRAVENOUS; SUBCUTANEOUS at 22:48

## 2020-09-03 RX ADMIN — REPAGLINIDE 1 MG: 0.5 TABLET ORAL at 16:42

## 2020-09-03 RX ADMIN — GABAPENTIN 300 MG: 300 CAPSULE ORAL at 20:23

## 2020-09-03 RX ADMIN — CETIRIZINE HYDROCHLORIDE 10 MG: 10 TABLET, FILM COATED ORAL at 20:23

## 2020-09-03 RX ADMIN — GLIPIZIDE 10 MG: 10 TABLET ORAL at 20:27

## 2020-09-03 RX ADMIN — REPAGLINIDE 1 MG: 0.5 TABLET ORAL at 08:08

## 2020-09-03 RX ADMIN — REPAGLINIDE 1 MG: 0.5 TABLET ORAL at 13:39

## 2020-09-03 RX ADMIN — GABAPENTIN 300 MG: 300 CAPSULE ORAL at 14:28

## 2020-09-03 RX ADMIN — HYDROCODONE BITARTRATE AND ACETAMINOPHEN 1 TABLET: 7.5; 325 TABLET ORAL at 10:09

## 2020-09-03 RX ADMIN — FAMOTIDINE 20 MG: 20 TABLET ORAL at 08:08

## 2020-09-03 RX ADMIN — HYDROCODONE BITARTRATE AND ACETAMINOPHEN 1 TABLET: 7.5; 325 TABLET ORAL at 23:08

## 2020-09-03 ASSESSMENT — PAIN SCALES - GENERAL
PAINLEVEL_OUTOF10: 7
PAINLEVEL_OUTOF10: 3
PAINLEVEL_OUTOF10: 2
PAINLEVEL_OUTOF10: 7
PAINLEVEL_OUTOF10: 2
PAINLEVEL_OUTOF10: 9
PAINLEVEL_OUTOF10: 7

## 2020-09-03 ASSESSMENT — PAIN DESCRIPTION - LOCATION
LOCATION: BUTTOCKS
LOCATION: BUTTOCKS

## 2020-09-03 ASSESSMENT — PAIN DESCRIPTION - DESCRIPTORS: DESCRIPTORS: ACHING;SORE

## 2020-09-03 ASSESSMENT — PAIN DESCRIPTION - PAIN TYPE
TYPE: ACUTE PAIN
TYPE: ACUTE PAIN

## 2020-09-03 ASSESSMENT — PAIN DESCRIPTION - FREQUENCY: FREQUENCY: CONTINUOUS

## 2020-09-03 ASSESSMENT — PAIN DESCRIPTION - PROGRESSION: CLINICAL_PROGRESSION: GRADUALLY IMPROVING

## 2020-09-03 ASSESSMENT — PAIN - FUNCTIONAL ASSESSMENT: PAIN_FUNCTIONAL_ASSESSMENT: PREVENTS OR INTERFERES SOME ACTIVE ACTIVITIES AND ADLS

## 2020-09-03 ASSESSMENT — PAIN DESCRIPTION - ONSET: ONSET: ON-GOING

## 2020-09-03 NOTE — PROGRESS NOTES
[facility] General Surgery Progress Note    Chief Complaint:   Chief Complaint   Patient presents with    Wound Infection     in gluteal fold     SUBJECTIVE:  Mr. Felice Pratt is a 38 y.o. male is seen and examined at bedside. He complains of pain in the area of his incisions. He recently had a dressing changed. Continued with some purulent drainage per the nurse. He denies fever, chills, chest pain, SOB, n/v/d/c.      OBJECTIVE:  /83   Pulse 91   Temp 97.8 °F (36.6 °C) (Temporal)   Resp 18   Ht 6' 1\" (1.854 m)   Wt (!) 401 lb (181.9 kg)   SpO2 95%   BMI 52.91 kg/m²   CONSTITUTIONAL: Alert, appropriate, no acute distress  SKIN: warm, dry with no rashes or lesions  HEENT: NCAT, Non icteric, PERR. Trachea Midline. CHEST/LUNGS: CTA bilaterally. Normal respiratory effort. CARDIOVASCULAR: RRR, No edema. ABDOMEN: soft, ND, appropriately TTP, +BS  NEUROLOGIC: CN II-XI grossly intact, no motor or sensory deficits   MUSCULOSKELETAL: No clubbing or cyanosis. PSYCHIATRIC:  Normal Mood and Affect. Alert and Oriented. Labs:  CBC:   Recent Labs     09/01/20  1141 09/02/20  0615 09/03/20  0319   WBC 12.0* 10.8 10.3   HGB 13.1* 13.0* 12.7*    359 362     BMP:    Recent Labs     09/01/20  1141 09/02/20  0615 09/03/20  0319    140 142   K 3.9 3.5 3.5   CL 99 102 105   CO2 27 25 23   BUN 12 7 12   CREATININE 0.6 0.7 0.9   GLUCOSE 206* 182* 171*       ASSESSMENT:    Abscess, gluteal, left    Type 2 diabetes mellitus with diabetic polyneuropathy, without long-term current use of insulin (HCC)    Morbid obesity with BMI of 50.0-59.9, adult (HCC)      Uncontrolled type 2 diabetes mellitus with hyperglycemia (HCC)    Cigarette nicotine dependence with nicotine-induced disorder    Tobacco abuse counseling    Personal history of noncompliance with medical treatment and regimen    PLAN:  Continued BID packing changes with saline irrigation. Antibiotics per ID. Continued strict glucose control.   Nicotine patches and smoking cessation strongly encouraged.      Bob, DO   Electronically Signed on 9/3/2020 at 12:54 PM

## 2020-09-03 NOTE — PROGRESS NOTES
Infectious Diseases Progress Note    Patient:  Martin Leon  YOB: 1980  MRN: [id number]   Admit date: 8/31/2020   Admitting Physician: Elham Pineda MD  Primary Care Physician: Dany Smith MD    Chief Complaint/Interval History:   No new symptoms. Some buttock area discomfort. Overall stable discomfort. No new involved areas. Continued drainage.     In/Out    Intake/Output Summary (Last 24 hours) at 9/3/2020 0827  Last data filed at 9/3/2020 0001  Gross per 24 hour   Intake 1646 ml   Output 800 ml   Net 846 ml     Allergies: No Known Allergies  Current Meds: piperacillin-tazobactam (ZOSYN) 4.5 g in dextrose 5 % 100 mL IVPB (mini-bag), Q8H  nicotine (NICODERM CQ) 21 MG/24HR 1 patch, Daily  glucose (GLUTOSE) 40 % oral gel 15 g, PRN  dextrose 50 % IV solution, PRN  glucagon (rDNA) injection 1 mg, PRN  dextrose 5 % solution, PRN  HYDROcodone-acetaminophen (NORCO) 7.5-325 MG per tablet 1 tablet, Q6H PRN  vancomycin (VANCOCIN) 1,500 mg in dextrose 5 % 500 mL IVPB, Q6H  vancomycin (VANCOCIN) intermittent dosing (placeholder), RX Placeholder  lactated ringers infusion 1,000 mL, Once  fluticasone (FLONASE) 50 MCG/ACT nasal spray 1 spray, Daily  cetirizine (ZYRTEC) tablet 10 mg, Nightly  repaglinide (PRANDIN) tablet 1 mg, TID AC  gabapentin (NEURONTIN) capsule 300 mg, TID  metFORMIN (GLUCOPHAGE) tablet 1,000 mg, BID WC  aspirin EC tablet 81 mg, Daily  glipiZIDE (GLUCOTROL) tablet 10 mg, QAM AC  sodium chloride flush 0.9 % injection 10 mL, 2 times per day  sodium chloride flush 0.9 % injection 10 mL, PRN  potassium chloride (KLOR-CON M) extended release tablet 40 mEq, PRN    Or  potassium bicarb-citric acid (EFFER-K) effervescent tablet 40 mEq, PRN    Or  potassium chloride 10 mEq/100 mL IVPB (Peripheral Line), PRN  magnesium sulfate 1 g in dextrose 5% 100 mL IVPB, PRN  acetaminophen (TYLENOL) tablet 650 mg, Q6H PRN    Or  acetaminophen (TYLENOL) suppository 650 mg, Q6H PRN  polyethylene glycol (GLYCOLAX) packet 17 g, Daily PRN  promethazine (PHENERGAN) tablet 12.5 mg, Q6H PRN    Or  ondansetron (ZOFRAN) injection 4 mg, Q6H PRN  famotidine (PEPCID) tablet 20 mg, BID  enoxaparin (LOVENOX) injection 40 mg, Daily  insulin lispro (HUMALOG) injection vial 0-6 Units, TID WC  insulin lispro (HUMALOG) injection vial 0-3 Units, Nightly      Review of Systems see HPI    VitalSigns:  /65   Pulse 80   Temp 97.8 °F (36.6 °C) (Temporal)   Resp 18   Ht 6' 1\" (1.854 m)   Wt (!) 401 lb (181.9 kg)   SpO2 95%   BMI 52.91 kg/m²      Physical Exam  Slowly decreasing tenderness and induration involved area on buttock. Lab Results:  CBC:   Recent Labs     09/01/20  1141 09/02/20  0615 09/03/20  0319   WBC 12.0* 10.8 10.3   HGB 13.1* 13.0* 12.7*    359 362     BMP:  Recent Labs     09/01/20  1141 09/02/20  0615 09/03/20  0319    140 142   K 3.9 3.5 3.5   CL 99 102 105   CO2 27 25 23   BUN 12 7 12   CREATININE 0.6 0.7 0.9   GLUCOSE 206* 182* 171*     CultureResults:  Wound cultures:  Anaerobic Culture  08/31/2020  4:40 PM  [facility]    No anaerobes to date,will hold 4 days    Organism Abnormal    08/31/2020  4:40 PM  [facility]    Staph aureus MRSA    WOUND/ABSCESS Abnormal    08/31/2020  4:40 PM  [address] [facility]    Moderate growth   CONTACT PRECAUTIONS INDICATED    Organism Abnormal    08/31/2020  4:40 PM  [address] [facility]    Strep pyogenes (Beta Strep Group A)    WOUND/ABSCESS  08/31/2020  4:40 PM  [address] [place] [address] Lab    Light growth   No further workup   Susceptibility testing of penicillin and other beta lactams is   not necessary for beta hemolytic Streptococci since resistant   strains have not been identified.  (CLSI M100)    Testing Performed By     Lab - Abbreviation  Name  Director  Address  Valid Date Range    [facility] [address] [place] [facility]  Jossue Brady MD  [address]   [address] [facility] [place] [address]  08/30/17 0733-Present    Narrative   Performed by: [facility]   ORDER#: [id number]                          ORDERED BY: HIREN HOUGH   SOURCE: Abscess buttock                    COLLECTED:  08/31/20 16:40   ANTIBIOTICS AT [facility]. :                      RECEIVED :  08/31/20 16:42    Susceptibility     Staph aureus mrsa (1)     Antibiotic  Interpretation  CASSIE  Status     benzylpenicillin  Resistant  >=0.5  mcg/mL      clindamycin  Resistant  >=4  mcg/mL      erythromycin  Resistant  >=8  mcg/mL      inducible clindamycin resistance  Negative  neg  mcg/mL      oxacillin  Resistant  >=4  mcg/mL      tetracycline  Sensitive  <=1  mcg/mL      trimethoprim-sulfamethoxazole  Sensitive  <=10  mcg/mL      vancomycin  Sensitive  1  mcg/mL       Radiology: None    Additional Studies Reviewed:  None    Impression:  Buttock abscess/carbuncle-MRSA and group A strep    Recommendations:  Discontinue vancomycin  Discontinue Zosyn  Begin ceftaroline  We will continue getting him up to shower 1-2 times daily to facilitate some warm water pressure removal and facilitate ongoing drainage  Hopefully change to oral antibiotics in the next 1 to 2 days    Kaitlin Betts MD

## 2020-09-03 NOTE — PROGRESS NOTES
Progress Note    Date:9/3/2020       Room:0511/511-01  Patient Name:Pérez Pinedo     YOB: 1980     Age:40 y.o. Subjective   Interval History Status: improved. Patient admitted 8/31 from the emergency room with complaints of worsening swelling of the left buttock region painful to touch as well as fluctuant. Patient underwent lancing of the area in the emergency room with wound cultures. General surgery/infectious diseasefollowing, patient on vancomycin and Zosyn, cultures growing staph aureus as well as strep pyogenes, awaiting susceptibility and finalization of antibiotic regimen by ID. Seen this morning in the room with wife present. Denied any acute overnight event. Have some associated pain in buttock area but well controlled with pain management, stated still having some purulent drainage. Denied any chest pain or shortness of breath. Tolerating dressing changes without overt uncontrolled pain. Review of Systems   Review of Systems  12 point system reviewed and negative except as stated above.     Medications   Scheduled Meds:    piperacillin-tazobactam  4.5 g Intravenous Q8H    nicotine  1 patch Transdermal Daily    vancomycin  1,500 mg Intravenous Q6H    vancomycin (VANCOCIN) intermittent dosing (placeholder)   Other RX Placeholder    fluticasone  1 spray Each Nostril Daily    cetirizine  10 mg Oral Nightly    repaglinide  1 mg Oral TID AC    gabapentin  300 mg Oral TID    metFORMIN  1,000 mg Oral BID WC    aspirin EC  81 mg Oral Daily    glipiZIDE  10 mg Oral QAM AC    sodium chloride flush  10 mL Intravenous 2 times per day    famotidine  20 mg Oral BID    enoxaparin  40 mg Subcutaneous Daily    insulin lispro  0-6 Units Subcutaneous TID WC    insulin lispro  0-3 Units Subcutaneous Nightly     Continuous Infusions:    dextrose      lactated ringers       PRN Meds: HYDROcodone-acetaminophen, glucose, dextrose, glucagon (rDNA), dextrose, sodium chloride flush, potassium chloride **OR** potassium alternative oral replacement **OR** potassium chloride, magnesium sulfate, acetaminophen **OR** acetaminophen, polyethylene glycol, promethazine **OR** ondansetron    Past History    Past Medical History:   has a past medical history of Degenerative disc disease at L5-S1 level, Hyperlipidemia, Nerve pain, and Type 2 diabetes mellitus without complication ([address] Social History:   reports that he has been smoking cigarettes. He has a 20.00 pack-year smoking history. He has never used smokeless tobacco. He reports current alcohol use. He reports that he does not use drugs. Family History:   Family History   Problem Relation Age of Onset    Other Mother         blockage    High Blood Pressure Father     Diabetes Father        Physical Examination      Vitals:  /77   Pulse 92   Temp 98.5 °F (36.9 °C) (Temporal)   Resp 18   Ht 6' 1\" (1.854 m)   Wt (!) 401 lb (181.9 kg)   SpO2 91%   BMI 52.91 kg/m²   Temp (24hrs), Av.9 °F (36.6 °C), Min:96.8 °F (36 °C), Max:99.1 °F (37.3 °C)      I/O (24Hr): Intake/Output Summary (Last 24 hours) at 9/3/2020 1502  Last data filed at 9/3/2020 0001  Gross per 24 hour   Intake 1246 ml   Output --   Net 1246 ml       Physical Exam  General: Morbid obesity, no acute distress lying comfortably in bed. HEENT: Atraumatic normocephalic  Cardiac: Normal S1-S2 no murmurs rub or gallop. Respiratory: clear To auscultation bilaterally, no rhonchi or rales  Abdomen: Truncal obesity, nontender to palpation, no organomegaly noted. : Left dressing intact, no drainage noted. no overt erythema noted.   Extremities: no tenderness, no edema, moves all extremities  Psych: Affect normal and good eye contact      Labs/Imaging/Diagnostics   Labs:  CBC:  Recent Labs     20  1141 20  0615 20  0319   WBC 12.0* 10.8 10.3   RBC 4.47* 4.42* 4.31*   HGB 13.1* 13.0* 12.7*   HCT 39.6* 38.5* 39.0*   MCV 88.6 87.1 90.5   RDW 13.1 12.9 12.9    359 362     CHEMISTRIES:  Recent Labs     09/01/20  1141 09/02/20  0615 09/03/20  0319    140 142   K 3.9 3.5 3.5   CL 99 102 105   CO2 27 25 23   BUN 12 7 12   CREATININE 0.6 0.7 0.9   GLUCOSE 206* 182* 171*   PHOS 2.8  --   --    MG 2.4 2.2 2.2     PT/INR:No results for input(s): PROTIME, INR in the last 72 hours. APTT:No results for input(s): APTT in the last 72 hours. LIVER PROFILE:  No results for input(s): AST, ALT, BILIDIR, BILITOT, ALKPHOS in the last 72 hours. Imaging Last 24 Hours:  Ct Abdomen Pelvis W Iv Contrast Additional Contrast? None    Result Date: 8/31/2020  Exam: CT ABDOMEN PELVIS W IV CONTRAST - 8/31/2020 2:00 PM Indication: Concern for tracking of abscess, LEFT buttock boil Comparison: None available. DLP: 2324 mGy cm. In order to have a CT radiation dose as low as reasonably achievable, Automated Exposure Control was utilized for adjustment of the mA and/or KV according to patient size. Findings: Subtle tissue inflammation in the medial LEFT gluteal soft tissues extending into the LEFT perianal region. No fistulous tract identified although inflammation does abut the LEFT posterior aspect of the anus. No evidence of tracking into the perirectal soft tissues or rectum. No large organized fluid collection/abscess. Clear lung bases. The liver is steatotic. No gallstones or gallbladder wall thickening. No biliary ductal dilatation. Small calcification the pancreatic tail may be related to prior insult. The spleen and adrenal glands appear unremarkable. No urolithiasis or hydronephrosis. No solid renal mass. Urinary bladder appears unremarkable. Colonic diverticulum are noted. There is no evidence of bowel obstruction or active bowel inflammation. A normal appendix is identified. No ascites or free pelvic fluid. No pelvic mass or pelvic collection. Normal caliber abdominal aorta. No enlarged retroperitoneal, mesenteric, pelvic, or inguinal lymph nodes.  No aggressive bone lesions identified. 1.  LEFT gluteal soft tissue inflammation extending to the LEFT posterior anus. No visible fistulous tract or large abscess. No evidence of deep extension into the perirectal soft tissues or involvement of the rectum. 2.  The liver is steatotic.  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 8/31/2020 Yes    Generalized anxiety disorder 8/31/2020 Yes    Psychophysiological insomnia 8/31/2020 Yes    Hyperlipidemia associated with type 2 diabetes mellitus ([address] 8/31/2020 Yes    Type 2 diabetes mellitus with diabetic polyneuropathy, without long-term current use of insulin ([address] 8/31/2020 Yes    Morbid obesity with BMI of 50.0-59.9, adult ([address] 8/31/2020 Yes    Sepsis ([address] 8/31/2020 Yes    Uncontrolled type 2 diabetes mellitus with hyperglycemia ([address] 8/31/2020 Yes    Cigarette nicotine dependence with nicotine-induced disorder 8/31/2020 Yes    Tobacco abuse counseling 8/31/2020 Yes    Personal history of noncompliance with medical treatment and regimen 8/31/2020 Yes                      Plan:          Abscess, gluteal, left  Sepsis; now resolved  -Currently improving  -Evidenced by CT abdomen and pelvis 8/31  -Status post lancing, wound culture, cultures growing staph aureus as well as strep pyogenes, awaiting susceptibility and finalization of antibiotic regimen by ID.  -Blood cultures no growth to date  -Patient continues on vancomycin and Zosyn  -Antiemetics PRN, pain control modalities  -General surgery/infectious disease; appreciate recommendation     Uncontrolled type 2 diabetes with hyperglycemia-chronic condition, hold oral anti-hypoglycemics, sliding scale insulin, Accu-Cheks q.      Chronic Benign Essential Hypertension   - Continue current medications   - PRN medications ordered   - Will continue to monitor

## 2020-09-04 LAB
ANAEROBIC CULTURE: ABNORMAL
ANION GAP SERPL CALCULATED.3IONS-SCNC: 10 MMOL/L (ref 7–19)
BUN BLDV-MCNC: 16 MG/DL (ref 6–20)
CALCIUM SERPL-MCNC: 9.5 MG/DL (ref 8.6–10)
CHLORIDE BLD-SCNC: 106 MMOL/L (ref 98–111)
CO2: 28 MMOL/L (ref 22–29)
CREAT SERPL-MCNC: 1.6 MG/DL (ref 0.5–1.2)
GFR AFRICAN AMERICAN: 58
GFR NON-AFRICAN AMERICAN: 48
GLUCOSE BLD-MCNC: 133 MG/DL (ref 70–99)
GLUCOSE BLD-MCNC: 142 MG/DL (ref 70–99)
GLUCOSE BLD-MCNC: 154 MG/DL (ref 74–109)
GLUCOSE BLD-MCNC: 160 MG/DL (ref 70–99)
GLUCOSE BLD-MCNC: 207 MG/DL (ref 70–99)
GRAM STAIN RESULT: ABNORMAL
HCT VFR BLD CALC: 39.1 % (ref 42–52)
HEMOGLOBIN: 12.8 G/DL (ref 14–18)
MCH RBC QN AUTO: 29.6 PG (ref 27–31)
MCHC RBC AUTO-ENTMCNC: 32.7 G/DL (ref 33–37)
MCV RBC AUTO: 90.3 FL (ref 80–94)
ORGANISM: ABNORMAL
ORGANISM: ABNORMAL
PDW BLD-RTO: 13.1 % (ref 11.5–14.5)
PERFORMED ON: ABNORMAL
PLATELET # BLD: 380 K/UL (ref 130–400)
PMV BLD AUTO: 9.5 FL (ref 9.4–12.4)
POTASSIUM REFLEX MAGNESIUM: 3.7 MMOL/L (ref 3.5–5)
RBC # BLD: 4.33 M/UL (ref 4.7–6.1)
SODIUM BLD-SCNC: 144 MMOL/L (ref 136–145)
WBC # BLD: 11.3 K/UL (ref 4.8–10.8)
WOUND/ABSCESS: ABNORMAL
WOUND/ABSCESS: ABNORMAL

## 2020-09-04 PROCEDURE — 82947 ASSAY GLUCOSE BLOOD QUANT: CPT

## 2020-09-04 PROCEDURE — 99231 SBSQ HOSP IP/OBS SF/LOW 25: CPT | Performed by: SURGERY

## 2020-09-04 PROCEDURE — 2580000003 HC RX 258: Performed by: INTERNAL MEDICINE

## 2020-09-04 PROCEDURE — 85027 COMPLETE CBC AUTOMATED: CPT

## 2020-09-04 PROCEDURE — 80048 BASIC METABOLIC PNL TOTAL CA: CPT

## 2020-09-04 PROCEDURE — 36415 COLL VENOUS BLD VENIPUNCTURE: CPT

## 2020-09-04 PROCEDURE — 6370000000 HC RX 637 (ALT 250 FOR IP): Performed by: HOSPITALIST

## 2020-09-04 PROCEDURE — 6360000002 HC RX W HCPCS: Performed by: INTERNAL MEDICINE

## 2020-09-04 PROCEDURE — 6360000002 HC RX W HCPCS: Performed by: HOSPITALIST

## 2020-09-04 PROCEDURE — 1210000000 HC MED SURG R&B

## 2020-09-04 RX ORDER — DOCUSATE SODIUM 100 MG/1
100 CAPSULE, LIQUID FILLED ORAL 2 TIMES DAILY
Status: DISCONTINUED | OUTPATIENT
Start: 2020-09-04 | End: 2020-09-05 | Stop reason: HOSPADM

## 2020-09-04 RX ORDER — SENNA PLUS 8.6 MG/1
1 TABLET ORAL 2 TIMES DAILY
Status: DISCONTINUED | OUTPATIENT
Start: 2020-09-04 | End: 2020-09-05 | Stop reason: HOSPADM

## 2020-09-04 RX ADMIN — INSULIN LISPRO 1 UNITS: 100 INJECTION, SOLUTION INTRAVENOUS; SUBCUTANEOUS at 22:10

## 2020-09-04 RX ADMIN — ENOXAPARIN SODIUM 40 MG: 100 INJECTION SUBCUTANEOUS at 08:40

## 2020-09-04 RX ADMIN — REPAGLINIDE 1 MG: 0.5 TABLET ORAL at 08:41

## 2020-09-04 RX ADMIN — GABAPENTIN 300 MG: 300 CAPSULE ORAL at 20:32

## 2020-09-04 RX ADMIN — ASPIRIN 81 MG: 81 TABLET, COATED ORAL at 08:41

## 2020-09-04 RX ADMIN — METFORMIN HYDROCHLORIDE 1000 MG: 500 TABLET ORAL at 08:41

## 2020-09-04 RX ADMIN — METFORMIN HYDROCHLORIDE 1000 MG: 500 TABLET ORAL at 16:08

## 2020-09-04 RX ADMIN — REPAGLINIDE 1 MG: 0.5 TABLET ORAL at 16:07

## 2020-09-04 RX ADMIN — FAMOTIDINE 20 MG: 20 TABLET ORAL at 08:41

## 2020-09-04 RX ADMIN — GLIPIZIDE 10 MG: 10 TABLET ORAL at 20:32

## 2020-09-04 RX ADMIN — FLUTICASONE PROPIONATE 1 SPRAY: 50 SPRAY, METERED NASAL at 10:25

## 2020-09-04 RX ADMIN — HYDROCODONE BITARTRATE AND ACETAMINOPHEN 1 TABLET: 7.5; 325 TABLET ORAL at 22:10

## 2020-09-04 RX ADMIN — HYDROCODONE BITARTRATE AND ACETAMINOPHEN 1 TABLET: 7.5; 325 TABLET ORAL at 18:12

## 2020-09-04 RX ADMIN — CETIRIZINE HYDROCHLORIDE 10 MG: 10 TABLET, FILM COATED ORAL at 20:32

## 2020-09-04 RX ADMIN — FAMOTIDINE 20 MG: 20 TABLET ORAL at 20:34

## 2020-09-04 RX ADMIN — HYDROCODONE BITARTRATE AND ACETAMINOPHEN 1 TABLET: 7.5; 325 TABLET ORAL at 08:41

## 2020-09-04 RX ADMIN — GABAPENTIN 300 MG: 300 CAPSULE ORAL at 14:44

## 2020-09-04 RX ADMIN — HYDROCODONE BITARTRATE AND ACETAMINOPHEN 1 TABLET: 7.5; 325 TABLET ORAL at 13:18

## 2020-09-04 RX ADMIN — CEFTAROLINE FOSAMIL 600 MG: 600 POWDER, FOR SOLUTION INTRAVENOUS at 14:30

## 2020-09-04 RX ADMIN — SENNOSIDES 8.6 MG: 8.6 TABLET, FILM COATED ORAL at 08:41

## 2020-09-04 RX ADMIN — DOCUSATE SODIUM 100 MG: 100 CAPSULE, LIQUID FILLED ORAL at 08:40

## 2020-09-04 RX ADMIN — CEFTAROLINE FOSAMIL 600 MG: 600 POWDER, FOR SOLUTION INTRAVENOUS at 01:26

## 2020-09-04 RX ADMIN — GABAPENTIN 300 MG: 300 CAPSULE ORAL at 08:41

## 2020-09-04 RX ADMIN — INSULIN LISPRO 1 UNITS: 100 INJECTION, SOLUTION INTRAVENOUS; SUBCUTANEOUS at 08:40

## 2020-09-04 ASSESSMENT — PAIN DESCRIPTION - LOCATION
LOCATION: BUTTOCKS

## 2020-09-04 ASSESSMENT — PAIN SCALES - GENERAL
PAINLEVEL_OUTOF10: 7
PAINLEVEL_OUTOF10: 10
PAINLEVEL_OUTOF10: 4
PAINLEVEL_OUTOF10: 6
PAINLEVEL_OUTOF10: 7
PAINLEVEL_OUTOF10: 4

## 2020-09-04 ASSESSMENT — PAIN DESCRIPTION - FREQUENCY
FREQUENCY: CONTINUOUS
FREQUENCY: CONTINUOUS

## 2020-09-04 ASSESSMENT — PAIN DESCRIPTION - PAIN TYPE
TYPE: ACUTE PAIN

## 2020-09-04 ASSESSMENT — PAIN DESCRIPTION - ORIENTATION
ORIENTATION: LEFT

## 2020-09-04 ASSESSMENT — PAIN DESCRIPTION - ONSET
ONSET: ON-GOING
ONSET: ON-GOING

## 2020-09-04 ASSESSMENT — PAIN DESCRIPTION - DESCRIPTORS
DESCRIPTORS: ACHING;CONSTANT
DESCRIPTORS: ACHING;CONSTANT

## 2020-09-04 NOTE — PLAN OF CARE
Problem: Skin Integrity:  Goal: Will show no infection signs and symptoms  Description: Will show no infection signs and symptoms  Outcome: Ongoing  Goal: Absence of new skin breakdown  Description: Absence of new skin breakdown  Outcome: Ongoing     Problem: Pain:  Description: Pain management should include both nonpharmacologic and pharmacologic interventions.   Goal: Pain level will decrease  Description: Pain level will decrease  Outcome: Ongoing  Goal: Control of acute pain  Description: Control of acute pain  Outcome: Ongoing  Goal: Control of chronic pain  Description: Control of chronic pain  Outcome: Ongoing     Problem: Falls - Risk of:  Goal: Will remain free from falls  Description: Will remain free from falls  Outcome: Ongoing  Goal: Absence of physical injury  Description: Absence of physical injury  Outcome: Ongoing

## 2020-09-04 NOTE — PROGRESS NOTES
Infectious Diseases Progress Note    Patient:  Marquise Sheriff  YOB: 1980  MRN: [id number]   Admit date: 8/31/2020   Admitting Physician: Osmani Oscar MD  Primary Care Physician: Petra Dill MD    Chief Complaint/Interval History: He indicates it took a little while for him to get his pain medicine this morning after he had requested it. He just had pain medicine is more comfortable. Wife learning how to do dressing changes.     In/Out    Intake/Output Summary (Last 24 hours) at 9/4/2020 0755  Last data filed at 9/4/2020 0734  Gross per 24 hour   Intake 5119 ml   Output 1100 ml   Net 4019 ml     Allergies: No Known Allergies  Current Meds: senna (SENOKOT) tablet 8.6 mg, BID  docusate sodium (COLACE) capsule 100 mg, BID  HYDROcodone-acetaminophen (NORCO) 7.5-325 MG per tablet 1 tablet, Q4H PRN  ceftaroline fosamil (TEFLARO) 600 mg in dextrose 5 % 50 mL IVPB, Q12H  nicotine (NICODERM CQ) 21 MG/24HR 1 patch, Daily  glucose (GLUTOSE) 40 % oral gel 15 g, PRN  dextrose 50 % IV solution, PRN  glucagon (rDNA) injection 1 mg, PRN  dextrose 5 % solution, PRN  lactated ringers infusion 1,000 mL, Once  fluticasone (FLONASE) 50 MCG/ACT nasal spray 1 spray, Daily  cetirizine (ZYRTEC) tablet 10 mg, Nightly  repaglinide (PRANDIN) tablet 1 mg, TID AC  gabapentin (NEURONTIN) capsule 300 mg, TID  metFORMIN (GLUCOPHAGE) tablet 1,000 mg, BID WC  aspirin EC tablet 81 mg, Daily  glipiZIDE (GLUCOTROL) tablet 10 mg, QAM AC  sodium chloride flush 0.9 % injection 10 mL, 2 times per day  sodium chloride flush 0.9 % injection 10 mL, PRN  potassium chloride (KLOR-CON M) extended release tablet 40 mEq, PRN    Or  potassium bicarb-citric acid (EFFER-K) effervescent tablet 40 mEq, PRN    Or  potassium chloride 10 mEq/100 mL IVPB (Peripheral Line), PRN  magnesium sulfate 1 g in dextrose 5% 100 mL IVPB, PRN  acetaminophen (TYLENOL) tablet 650 mg, Q6H PRN    Or  acetaminophen (TYLENOL) suppository 650 mg, Q6H PRN  polyethylene glycol (GLYCOLAX) packet 17 g, Daily PRN  promethazine (PHENERGAN) tablet 12.5 mg, Q6H PRN    Or  ondansetron (ZOFRAN) injection 4 mg, Q6H PRN  famotidine (PEPCID) tablet 20 mg, BID  enoxaparin (LOVENOX) injection 40 mg, Daily  insulin lispro (HUMALOG) injection vial 0-6 Units, TID WC  insulin lispro (HUMALOG) injection vial 0-3 Units, Nightly      Review of Systems no diarrhea or rash    VitalSigns:  /83   Pulse 81   Temp 98.6 °F (37 °C) (Temporal)   Resp 18   Ht 6' 1\" (1.854 m)   Wt (!) 401 lb (181.9 kg)   SpO2 96%   BMI 52.91 kg/m²      Physical Exam  Line/IV site: No erythema, warmth, induration, or tenderness. Exam improving. Less induration less drainage. Lab Results:  CBC:   Recent Labs     09/02/20  0615 09/03/20 0319 09/04/20  0354   WBC 10.8 10.3 11.3*   HGB 13.0* 12.7* 12.8*    362 380     BMP:  Recent Labs     09/02/20  0615 09/03/20  0319 09/04/20  0354    142 144   K 3.5 3.5 3.7    105 106   CO2 25 23 28   BUN 7 12 16   CREATININE 0.7 0.9 1.6*   GLUCOSE 182* 171* 154*     Radiology: None    Additional Studies Reviewed:  None    Impression:  Large buttock carbuncle secondary to MRSA and beta strep. Wound showing improvement. Slight increase in creatinine    Recommendations:  Previous treatment with vancomycin and Zosyn discontinued yesterday. Continue treatment with ceftaroline  Continue local wound care  Repeat renal function in a.m.   If continued improvement in wound and improved renal function tomorrow likely transition to oral antibiotic treatment and possible discharge home if okay with others    Martha Torres MD

## 2020-09-04 NOTE — PROGRESS NOTES
Progress Note    Date:9/4/2020       Room:0511/511-01  Patient Name:Pérez Pinedo     YOB: 1980     Age:40 y.o. Subjective   Interval History Status: improved. Patient admitted 8/31 from the emergency room with complaints of worsening swelling of the left buttock region painful to touch as well as fluctuant. Patient underwent lancing of the area in the emergency room with wound cultures. General surgery/infectious diseasefollowing, patient initially on vancomycin and Zosyn then transitioned to ceftoraline, cultures growing staph aureus as well as strep pyogenes. Seen this morning in the room, patient concerned that might be developing pus pocket as not able to pack wound deeply. Informed dressing will be done this afternoon with nurse to reassess. Have some associated pain in buttock area but well controlled with pain management, stated still having some purulent drainage. Denied any chest pain or shortness of breath. Review of Systems   Review of Systems  12 point system reviewed and negative except as stated above.       Medications   Scheduled Meds:    senna  1 tablet Oral BID    docusate sodium  100 mg Oral BID    ceftaroline fosamil (TEFLARO) IVPB  600 mg Intravenous Q12H    nicotine  1 patch Transdermal Daily    fluticasone  1 spray Each Nostril Daily    cetirizine  10 mg Oral Nightly    repaglinide  1 mg Oral TID AC    gabapentin  300 mg Oral TID    metFORMIN  1,000 mg Oral BID WC    aspirin EC  81 mg Oral Daily    glipiZIDE  10 mg Oral QAM AC    sodium chloride flush  10 mL Intravenous 2 times per day    famotidine  20 mg Oral BID    enoxaparin  40 mg Subcutaneous Daily    insulin lispro  0-6 Units Subcutaneous TID WC    insulin lispro  0-3 Units Subcutaneous Nightly     Continuous Infusions:    dextrose      lactated ringers       PRN Meds: HYDROcodone-acetaminophen, glucose, dextrose, glucagon (rDNA), dextrose, sodium chloride flush, potassium chloride **OR** potassium alternative oral replacement **OR** potassium chloride, magnesium sulfate, acetaminophen **OR** acetaminophen, polyethylene glycol, promethazine **OR** ondansetron    Past History    Past Medical History:   has a past medical history of Degenerative disc disease at L5-S1 level, Hyperlipidemia, Nerve pain, and Type 2 diabetes mellitus without complication ([address] Social History:   reports that he has been smoking cigarettes. He has a 20.00 pack-year smoking history. He has never used smokeless tobacco. He reports current alcohol use. He reports that he does not use drugs. Family History:   Family History   Problem Relation Age of Onset    Other Mother         blockage    High Blood Pressure Father     Diabetes Father        Physical Examination      Vitals:  BP (!) 142/76   Pulse 80   Temp 98.2 °F (36.8 °C) (Temporal)   Resp 18   Ht 6' 1\" (1.854 m)   Wt (!) 401 lb (181.9 kg)   SpO2 94%   BMI 52.91 kg/m²   Temp (24hrs), Av.9 °F (36.6 °C), Min:96.8 °F (36 °C), Max:98.6 °F (37 °C)      I/O (24Hr): Intake/Output Summary (Last 24 hours) at 2020 1149  Last data filed at 2020 0734  Gross per 24 hour   Intake 4549 ml   Output 1700 ml   Net 2849 ml       Physical Exam  General: Morbid obesity, no acute distress lying comfortably in bed. HEENT: Atraumatic normocephalic  Cardiac: Normal S1-S2 no murmurs rub or gallop. Respiratory: clear To auscultation bilaterally, no rhonchi or rales  Abdomen: Truncal obesity, nontender to palpation, no organomegaly noted. : Left dressing intact, mild drainage noted. no overt erythema noted.   Extremities: no tenderness, no edema, moves all extremities  Psych: Affect normal and good eye contact      Labs/Imaging/Diagnostics   Labs:  CBC:  Recent Labs     20  0615 20  0319 20  0354   WBC 10.8 10.3 11.3*   RBC 4.42* 4.31* 4.33*   HGB 13.0* 12.7* 12.8*   HCT 38.5* 39.0* 39.1*   MCV 87.1 90.5 90.3   RDW 12.9 12.9 13.1    362 380     CHEMISTRIES:  Recent Labs     09/02/20  0615 09/03/20  0319 09/04/20  0354    142 144   K 3.5 3.5 3.7    105 106   CO2 25 23 28   BUN 7 12 16   CREATININE 0.7 0.9 1.6*   GLUCOSE 182* 171* 154*   MG 2.2 2.2  --      PT/INR:No results for input(s): PROTIME, INR in the last 72 hours. APTT:No results for input(s): APTT in the last 72 hours. LIVER PROFILE:  No results for input(s): AST, ALT, BILIDIR, BILITOT, ALKPHOS in the last 72 hours. Imaging Last 24 Hours:  Ct Abdomen Pelvis W Iv Contrast Additional Contrast? None    Result Date: 8/31/2020  Exam: CT ABDOMEN PELVIS W IV CONTRAST - 8/31/2020 2:00 PM Indication: Concern for tracking of abscess, LEFT buttock boil Comparison: None available. DLP: 2324 mGy cm. In order to have a CT radiation dose as low as reasonably achievable, Automated Exposure Control was utilized for adjustment of the mA and/or KV according to patient size. Findings: Subtle tissue inflammation in the medial LEFT gluteal soft tissues extending into the LEFT perianal region. No fistulous tract identified although inflammation does abut the LEFT posterior aspect of the anus. No evidence of tracking into the perirectal soft tissues or rectum. No large organized fluid collection/abscess. Clear lung bases. The liver is steatotic. No gallstones or gallbladder wall thickening. No biliary ductal dilatation. Small calcification the pancreatic tail may be related to prior insult. The spleen and adrenal glands appear unremarkable. No urolithiasis or hydronephrosis. No solid renal mass. Urinary bladder appears unremarkable. Colonic diverticulum are noted. There is no evidence of bowel obstruction or active bowel inflammation. A normal appendix is identified. No ascites or free pelvic fluid. No pelvic mass or pelvic collection. Normal caliber abdominal aorta. No enlarged retroperitoneal, mesenteric, pelvic, or inguinal lymph nodes. No aggressive bone lesions identified.     1.  LEFT gluteal soft tissue inflammation extending to the LEFT posterior anus. No visible fistulous tract or large abscess. No evidence of deep extension into the perirectal soft tissues or involvement of the rectum. 2.  The liver is steatotic.  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 8/31/2020 Yes    Generalized anxiety disorder 8/31/2020 Yes    Psychophysiological insomnia 8/31/2020 Yes    Hyperlipidemia associated with type 2 diabetes mellitus ([address] 8/31/2020 Yes    Type 2 diabetes mellitus with diabetic polyneuropathy, without long-term current use of insulin ([address] 8/31/2020 Yes    Morbid obesity with BMI of 50.0-59.9, adult ([address] 8/31/2020 Yes    Sepsis ([address] 8/31/2020 Yes    Uncontrolled type 2 diabetes mellitus with hyperglycemia ([facility] 8/31/2020 Yes    Cigarette nicotine dependence with nicotine-induced disorder 8/31/2020 Yes    Tobacco abuse counseling 8/31/2020 Yes    Personal history of noncompliance with medical treatment and regimen 8/31/2020 Yes                      Plan:          Abscess, gluteal, left  Sepsis; now resolved  -Currently improving  -Evidenced by CT abdomen and pelvis 8/31  -Status post lancing, wound culture, cultures growing staph aureus as well as strep pyogenes  -Blood cultures no growth to date  -Initially on vancomycin and Zosyn; now on ceftaroline; hopeful for oral regimen at discharge  -Antiemetics PRN, pain control modalities  -General surgery/infectious disease; appreciate recommendation     Uncontrolled type 2 diabetes with hyperglycemia-chronic condition, hold oral anti-hypoglycemics, sliding scale insulin, Accu-Cheks q.      Chronic Benign Essential Hypertension   - Continue current medications   - PRN medications ordered   - Will continue to monitor      Dyslipidemia   - Continue Statin therapy     Hepatic steatosis -evidenced by CT abdomen and pelvis, correlated by patient's obesity, hyperlipidemia, hypertension        Code: Full  DVT prophylaxis: Enoxaparin  Diet: Diabetic  Disposition: Pending finalization of antibiotic regimen (hopefully oral regimen)      Electronically signed by   Patito Miller   Internal Medicine Hospitalist  On 9/4/2020  At 11:49 AM    EMR Dragon/Transcription disclaimer:   Much of this encounter note is an electronic transcription/translation of spoken language to printed text.  The electronic translation of spoken language may permit erroneous, or at times, nonsensical words or phrases to be inadvertently transcribed; although attempts have made to review the note for such errors, some may still exist.

## 2020-09-04 NOTE — PROGRESS NOTES
[facility] General Surgery Progress Note    Chief Complaint:   Chief Complaint   Patient presents with    Wound Infection     in gluteal fold       SUBJECTIVE:  Mr. oRn Garcia is a 38 y.o. male is seen and examined at bedside. He notes pain with dressing changes but otherwise he is doing well. He is tolerating his diet and able to ambulate. The dressings are being changed by his female friend, and he has concerns she is not packing them deep enough because she doesn't want to hurt him. He denies fever, chills, chest pain, SOB, n/v/d/c.      OBJECTIVE:  BP (!) 142/76   Pulse 80   Temp 98.2 °F (36.8 °C) (Temporal)   Resp 18   Ht 6' 1\" (1.854 m)   Wt (!) 401 lb (181.9 kg)   SpO2 94%   BMI 52.91 kg/m²   CONSTITUTIONAL: Alert, appropriate, no acute distress. Obese. SKIN: warm, dry with no rashes or lesions  HEENT: NCAT, Non icteric, PERR. Trachea Midline. CHEST/LUNGS: CTA bilaterally. Normal respiratory effort. CARDIOVASCULAR: RRR, No edema. ABDOMEN: soft, ND, appropriately TTP, +BS. Tiny amount of packing in wound. Serous drainage. No foul odor or purulence at this point. Surrounding induration present. NEUROLOGIC: CN II-XI grossly intact, no motor or sensory deficits   MUSCULOSKELETAL: No clubbing or cyanosis. PSYCHIATRIC:  Normal Mood and Affect. Alert and Oriented.     Labs:  CBC:   Recent Labs     09/02/20  0615 09/03/20 0319 09/04/20  0354   WBC 10.8 10.3 11.3*   HGB 13.0* 12.7* 12.8*    362 380     BMP:    Recent Labs     09/02/20  0615 09/03/20  0319 09/04/20  0354    142 144   K 3.5 3.5 3.7    105 106   CO2 25 23 28   BUN 7 12 16   CREATININE 0.7 0.9 1.6*   GLUCOSE 182* 171* 154*       ASSESSMENT:    Abscess, gluteal, left    Morbid obesity with BMI of 50.0-59.9, adult (HCC)    Uncontrolled type 2 diabetes mellitus with hyperglycemia (HCC)    Cigarette nicotine dependence with nicotine-induced disorder    Tobacco abuse counseling    Personal history of noncompliance with medical treatment and regimen    PLAN:  Teflaro started per ID. Repeat Cr tomorrow. Recommend deeper packing changes BID. Discussed with patient and nursing. Re-packed at bedside. Patient tolerated well. Surgically stable for discharge. May follow in the office as needed. Dr. Len Singh is covering the weekend and will be available if needed.     Tato García DO   Electronically Signed on 9/4/2020 at 1:13 PM

## 2020-09-04 NOTE — PROGRESS NOTES
Nutrition Assessment     Type and Reason for Visit: Reassess    Nutrition Assessment:  Pt with good PO intake to meet nutritional needs. Wound healing ONS ordered. Will continue to monitor clinical course. Malnutrition Assessment:  Malnutrition Status: No malnutrition    Current Nutrition Therapies:    DIET CARB CONTROL; Dietary Nutrition Supplements: Wound Healing Oral Supplement    Anthropometric Measures:  · Height: 6' 1\" (185.4 cm)  · Current Body Wt: 401 lb (181.9 kg)   · BMI: 52.9    Nutrition Diagnosis:   · Increased nutrient needs related to increase demand for energy/nutrients as evidenced by wounds    Nutrition Interventions:   Food and/or Nutrient Delivery:  Continue Current Diet, Continue Oral Nutrition Supplement  Nutrition Education/Counseling:  Education completed   Coordination of Nutrition Care:  Continued Inpatient Monitoring    Goals:  po intake 75% or greater. Weight decrease 1-3# per week.   Accuchek's/Glucose level 150 or less       Nutrition Monitoring and Evaluation:   Behavioral-Environmental Outcomes:  Readiness for Change   Food/Nutrient Intake Outcomes:  Food and Nutrient Intake, Supplement Intake  Physical Signs/Symptoms Outcomes:  Biochemical Data, Nutrition Focused Physical Findings, Skin, Weight     Discharge Planning:    Continue current diet     Electronically signed by Donya Barnes, MS, RD, LD on 9/4/20 at 1:23 PM CDT    Contact: [phone]

## 2020-09-05 VITALS
RESPIRATION RATE: 14 BRPM | TEMPERATURE: 97 F | HEART RATE: 80 BPM | WEIGHT: 315 LBS | HEIGHT: 73 IN | SYSTOLIC BLOOD PRESSURE: 141 MMHG | BODY MASS INDEX: 41.75 KG/M2 | OXYGEN SATURATION: 96 % | DIASTOLIC BLOOD PRESSURE: 87 MMHG

## 2020-09-05 LAB
ANION GAP SERPL CALCULATED.3IONS-SCNC: 13 MMOL/L (ref 7–19)
BLOOD CULTURE, ROUTINE: NORMAL
BUN BLDV-MCNC: 19 MG/DL (ref 6–20)
CALCIUM SERPL-MCNC: 9.2 MG/DL (ref 8.6–10)
CHLORIDE BLD-SCNC: 106 MMOL/L (ref 98–111)
CO2: 27 MMOL/L (ref 22–29)
CREAT SERPL-MCNC: 1.6 MG/DL (ref 0.5–1.2)
CULTURE, BLOOD 2: NORMAL
GFR AFRICAN AMERICAN: 58
GFR NON-AFRICAN AMERICAN: 48
GLUCOSE BLD-MCNC: 124 MG/DL (ref 74–109)
GLUCOSE BLD-MCNC: 207 MG/DL (ref 70–99)
HCT VFR BLD CALC: 39.7 % (ref 42–52)
HEMOGLOBIN: 12.7 G/DL (ref 14–18)
MCH RBC QN AUTO: 29.2 PG (ref 27–31)
MCHC RBC AUTO-ENTMCNC: 32 G/DL (ref 33–37)
MCV RBC AUTO: 91.3 FL (ref 80–94)
PDW BLD-RTO: 13 % (ref 11.5–14.5)
PERFORMED ON: ABNORMAL
PLATELET # BLD: 407 K/UL (ref 130–400)
PMV BLD AUTO: 9.6 FL (ref 9.4–12.4)
POTASSIUM REFLEX MAGNESIUM: 4.5 MMOL/L (ref 3.5–5)
RBC # BLD: 4.35 M/UL (ref 4.7–6.1)
SODIUM BLD-SCNC: 146 MMOL/L (ref 136–145)
WBC # BLD: 9.8 K/UL (ref 4.8–10.8)

## 2020-09-05 PROCEDURE — 6360000002 HC RX W HCPCS: Performed by: INTERNAL MEDICINE

## 2020-09-05 PROCEDURE — 6370000000 HC RX 637 (ALT 250 FOR IP): Performed by: HOSPITALIST

## 2020-09-05 PROCEDURE — 85027 COMPLETE CBC AUTOMATED: CPT

## 2020-09-05 PROCEDURE — 2580000003 HC RX 258: Performed by: INTERNAL MEDICINE

## 2020-09-05 PROCEDURE — 80048 BASIC METABOLIC PNL TOTAL CA: CPT

## 2020-09-05 PROCEDURE — 36415 COLL VENOUS BLD VENIPUNCTURE: CPT

## 2020-09-05 PROCEDURE — 82947 ASSAY GLUCOSE BLOOD QUANT: CPT

## 2020-09-05 PROCEDURE — 6360000002 HC RX W HCPCS: Performed by: HOSPITALIST

## 2020-09-05 RX ORDER — LINEZOLID 600 MG/1
600 TABLET, FILM COATED ORAL 2 TIMES DAILY
Qty: 14 TABLET | Refills: 0 | Status: SHIPPED | OUTPATIENT
Start: 2020-09-05 | End: 2020-09-05 | Stop reason: HOSPADM

## 2020-09-05 RX ORDER — PSEUDOEPHEDRINE HCL 30 MG
100 TABLET ORAL 2 TIMES DAILY
Qty: 20 CAPSULE | Refills: 0 | Status: SHIPPED | OUTPATIENT
Start: 2020-09-05 | End: 2020-09-15

## 2020-09-05 RX ORDER — HYDROCODONE BITARTRATE AND ACETAMINOPHEN 7.5; 325 MG/1; MG/1
1 TABLET ORAL EVERY 6 HOURS PRN
Qty: 12 TABLET | Refills: 0 | Status: SHIPPED | OUTPATIENT
Start: 2020-09-05 | End: 2020-09-08

## 2020-09-05 RX ORDER — CEPHALEXIN 500 MG/1
1000 CAPSULE ORAL 3 TIMES DAILY
Qty: 42 CAPSULE | Refills: 0 | Status: SHIPPED | OUTPATIENT
Start: 2020-09-05 | End: 2020-09-12

## 2020-09-05 RX ORDER — DOXYCYCLINE HYCLATE 100 MG
100 TABLET ORAL 2 TIMES DAILY
Qty: 14 TABLET | Refills: 0 | Status: SHIPPED | OUTPATIENT
Start: 2020-09-05 | End: 2020-09-12

## 2020-09-05 RX ORDER — SENNA PLUS 8.6 MG/1
1 TABLET ORAL 2 TIMES DAILY
Qty: 60 TABLET | Refills: 0 | Status: SHIPPED | OUTPATIENT
Start: 2020-09-05 | End: 2020-10-05

## 2020-09-05 RX ADMIN — CEFTAROLINE FOSAMIL 600 MG: 600 POWDER, FOR SOLUTION INTRAVENOUS at 00:30

## 2020-09-05 RX ADMIN — DOCUSATE SODIUM 100 MG: 100 CAPSULE, LIQUID FILLED ORAL at 08:47

## 2020-09-05 RX ADMIN — HYDROCODONE BITARTRATE AND ACETAMINOPHEN 1 TABLET: 7.5; 325 TABLET ORAL at 10:07

## 2020-09-05 RX ADMIN — INSULIN LISPRO 2 UNITS: 100 INJECTION, SOLUTION INTRAVENOUS; SUBCUTANEOUS at 08:46

## 2020-09-05 RX ADMIN — HYDROCODONE BITARTRATE AND ACETAMINOPHEN 1 TABLET: 7.5; 325 TABLET ORAL at 05:26

## 2020-09-05 RX ADMIN — METFORMIN HYDROCHLORIDE 1000 MG: 500 TABLET ORAL at 08:47

## 2020-09-05 RX ADMIN — GABAPENTIN 300 MG: 300 CAPSULE ORAL at 08:47

## 2020-09-05 RX ADMIN — CEFTAROLINE FOSAMIL 600 MG: 600 POWDER, FOR SOLUTION INTRAVENOUS at 11:39

## 2020-09-05 RX ADMIN — SENNOSIDES 8.6 MG: 8.6 TABLET, FILM COATED ORAL at 08:47

## 2020-09-05 RX ADMIN — ASPIRIN 81 MG: 81 TABLET, COATED ORAL at 08:47

## 2020-09-05 RX ADMIN — ENOXAPARIN SODIUM 40 MG: 100 INJECTION SUBCUTANEOUS at 08:47

## 2020-09-05 RX ADMIN — REPAGLINIDE 1 MG: 0.5 TABLET ORAL at 11:39

## 2020-09-05 RX ADMIN — REPAGLINIDE 1 MG: 0.5 TABLET ORAL at 06:45

## 2020-09-05 RX ADMIN — FAMOTIDINE 20 MG: 20 TABLET ORAL at 08:47

## 2020-09-05 ASSESSMENT — PAIN DESCRIPTION - PROGRESSION: CLINICAL_PROGRESSION: GRADUALLY IMPROVING

## 2020-09-05 ASSESSMENT — PAIN SCALES - GENERAL
PAINLEVEL_OUTOF10: 0
PAINLEVEL_OUTOF10: 7
PAINLEVEL_OUTOF10: 4
PAINLEVEL_OUTOF10: 7

## 2020-09-05 ASSESSMENT — PAIN DESCRIPTION - LOCATION: LOCATION: BUTTOCKS

## 2020-09-05 ASSESSMENT — PAIN DESCRIPTION - PAIN TYPE: TYPE: ACUTE PAIN

## 2020-09-05 ASSESSMENT — PAIN DESCRIPTION - ONSET: ONSET: ON-GOING

## 2020-09-05 ASSESSMENT — PAIN - FUNCTIONAL ASSESSMENT: PAIN_FUNCTIONAL_ASSESSMENT: PREVENTS OR INTERFERES SOME ACTIVE ACTIVITIES AND ADLS

## 2020-09-05 ASSESSMENT — PAIN DESCRIPTION - DESCRIPTORS: DESCRIPTORS: ACHING;CONSTANT

## 2020-09-05 ASSESSMENT — PAIN DESCRIPTION - ORIENTATION: ORIENTATION: LEFT

## 2020-09-05 NOTE — DISCHARGE SUMMARY
dressing intact, no drainage noted. no overt erythema noted. Extremities: no tenderness, no edema, moves all extremities  Psych: Affect normal and good eye contact      Consultants:   PHARMACY TO DOSE VANCOMYCIN  IP CONSULT TO GENERAL SURGERY  IP CONSULT TO INFECTIOUS DISEASES  IP CONSULT TO DIETITIAN    Time Spent on Discharge:  >30 minutes were spent in patient examination, evaluation, counseling as well as medication reconciliation, prescriptions for required medications, discharge plan and follow up. Surgeries/Procedures Performed:  NONE    Significant Diagnostic Studies:   Recent Labs:  CBC:   Lab Results   Component Value Date    WBC 9.8 09/05/2020    RBC 4.35 09/05/2020    HGB 12.7 09/05/2020    HCT 39.7 09/05/2020    MCV 91.3 09/05/2020    MCH 29.2 09/05/2020    MCHC 32.0 09/05/2020    RDW 13.0 09/05/2020     09/05/2020     BMP:    Lab Results   Component Value Date    GLUCOSE 124 09/05/2020     09/05/2020    K 4.5 09/05/2020     09/05/2020    CO2 27 09/05/2020    ANIONGAP 13 09/05/2020    BUN 19 09/05/2020    CREATININE 1.6 09/05/2020    CALCIUM 9.2 09/05/2020    LABGLOM 48 09/05/2020    GFRAA 58 09/05/2020       Radiology Last 7 Days:  Ct Abdomen Pelvis W Iv Contrast Additional Contrast? None    Result Date: 8/31/2020  1. LEFT gluteal soft tissue inflammation extending to the LEFT posterior anus. No visible fistulous tract or large abscess. No evidence of deep extension into the perirectal soft tissues or involvement of the rectum. 2.  The liver is steatotic.  Signed by Dr Alison Leon on 8/31/2020 3:39 PM      Discharge Plan   Disposition: Home    Provider Follow-Up:   Ever Burk MD  [address] [place] [address] [id number]  [address] [facility] [address] [facility]  [address] [place] [address]  [phone]            Patient Instructions   Diet: diabetic diet    Activity: activity as tolerated      Discharge Medications         Medication List      START taking these medications    cephALEXin 500 MG capsule  Commonly known as:  KEFLEX  Take 2 capsules by mouth 3 times daily for 7 days     docusate 100 MG Caps  Commonly known as:  COLACE, DULCOLAX  Take 100 mg by mouth 2 times daily for 10 days     doxycycline hyclate 100 MG tablet  Commonly known as:  VIBRA-TABS  Take 1 tablet by mouth 2 times daily for 7 days     HYDROcodone-acetaminophen 7.5-325 MG per tablet  Commonly known as:  NORCO  Take 1 tablet by mouth every 6 hours as needed for Pain for up to 3 days. senna 8.6 MG tablet  Commonly known as:  SENOKOT  Take 1 tablet by mouth 2 times daily        CONTINUE taking these medications    aspirin EC 81 MG EC tablet  Take 1 tablet by mouth daily     diclofenac 50 MG EC tablet  Commonly known as:  VOLTAREN  TAKE 1 TABLET BY MOUTH THREE TIMES DAILY WITH MEALS     fluticasone 50 MCG/ACT nasal spray  Commonly known as:  FLONASE  1 spray by Each Nostril route daily     gabapentin 300 MG capsule  Commonly known as:  NEURONTIN  Take 1 capsule by mouth 3 times daily for 30 days.      glipiZIDE 5 MG extended release tablet  Commonly known as:  GLUCOTROL XL  Take 1 tablet by mouth daily (with breakfast)     metFORMIN 1000 MG tablet  Commonly known as:  GLUCOPHAGE  Take 1 tablet by mouth 2 times daily (with meals)     nateglinide 120 MG tablet  Commonly known as:  STARLIX  Take 1 tablet by mouth 3 times daily (before meals)     ZyrTEC Allergy 10 MG Caps  Generic drug:  Cetirizine HCl  Take 10 mg by mouth nightly        STOP taking these medications    methylPREDNISolone 4 MG tablet  Commonly known as:  MEDROL (ANDREY)           Where to Get Your Medications      These medications were sent to [address] [place] [address], [address]  [address] [place] [address] [address] [place] [id number]    Phone:  [phone]   · cephALEXin 500 MG capsule  · docusate 100 MG Caps  · doxycycline hyclate 100 MG tablet  · senna 8.6 MG

## 2020-09-05 NOTE — PROGRESS NOTES
Line), PRN  magnesium sulfate 1 g in dextrose 5% 100 mL IVPB, PRN  acetaminophen (TYLENOL) tablet 650 mg, Q6H PRN    Or  acetaminophen (TYLENOL) suppository 650 mg, Q6H PRN  polyethylene glycol (GLYCOLAX) packet 17 g, Daily PRN  promethazine (PHENERGAN) tablet 12.5 mg, Q6H PRN    Or  ondansetron (ZOFRAN) injection 4 mg, Q6H PRN  famotidine (PEPCID) tablet 20 mg, BID  enoxaparin (LOVENOX) injection 40 mg, Daily  insulin lispro (HUMALOG) injection vial 0-6 Units, TID WC  insulin lispro (HUMALOG) injection vial 0-3 Units, Nightly      Review of Systems no nausea, diarrhea, or rash. VitalSigns:  BP (!) 141/87   Pulse 80   Temp 97 °F (36.1 °C) (Temporal)   Resp 14   Ht 6' 1\" (1.854 m)   Wt (!) 401 lb (181.9 kg)   SpO2 96%   BMI 52.91 kg/m²      Physical Exam  Line/IV site: No erythema, warmth, induration, or tenderness. Left gluteal fold area improved. Less tenderness. Less induration. Less drainage. Improved erythema. Lab Results:  CBC:   Recent Labs     09/03/20 0319 09/04/20  0354 09/05/20  0443   WBC 10.3 11.3* 9.8   HGB 12.7* 12.8* 12.7*    380 407*     BMP:  Recent Labs     09/03/20 0319 09/04/20  0354 09/05/20  0443    144 146*   K 3.5 3.7 4.5    106 106   CO2 23 28 27   BUN 12 16 19   CREATININE 0.9 1.6* 1.6*   GLUCOSE 171* 154* 124*     CultureResults:  Wound culture - MRSA and Streptococcus pyogenes:  Susceptibility     Staph aureus mrsa (1)     Antibiotic  Interpretation  CASSIE  Status     benzylpenicillin  Resistant  >=0.5  mcg/mL      clindamycin  Resistant  >=4  mcg/mL      erythromycin  Resistant  >=8  mcg/mL      inducible clindamycin resistance  Negative  neg  mcg/mL      oxacillin  Resistant  >=4  mcg/mL      tetracycline  Sensitive  <=1  mcg/mL      trimethoprim-sulfamethoxazole  Sensitive  <=10  mcg/mL      vancomycin  Sensitive  1  mcg/mL        Radiology: None    Additional Studies Reviewed:  None    Impression:  1. Carbuncle buttock/left gluteal fold area. Ongoing improvement. MRSA and Streptococcus pyogenes on culture. 2.  Acute renal insufficiency-stabilizing  3. Diabetes mellitus    Recommendations:  Okay with me for discharge home  Linezolid would be easier to use as it would cover both pathogens. Unfortunately linezolid cost prohibitive. Talked with patient and wife about avoiding sun exposure on doxycycline. Would recommend combination of doxycycline and Keflex. He is going to see Dr. Jossie Bowers next week for follow-up and assessment of renal function. Would recommend the following with regard to antibiotic treatment:  Keflex 1000 mg orally every 8 hours for 1 week  Doxycycline 1 mg orally every 12 hours for 1 week  Would be happy to see in follow-up in 2 weeks if patient would like additional ID follow-up  If he has adequate follow-up with his primary care physician and/or general surgery and would prefer to  avoid having to see multiple physicians he could follow-up with them and with me as needed.     Graciela Paul MD

## 2020-09-06 ASSESSMENT — ENCOUNTER SYMPTOMS
COUGH: 0
APNEA: 0
COLOR CHANGE: 0
EYE ITCHING: 0
BACK PAIN: 0
SHORTNESS OF BREATH: 0
PHOTOPHOBIA: 0
EYE DISCHARGE: 0

## 2020-09-06 NOTE — ED PROVIDER NOTES
[facility] 5 SURG SERVICES  eMERGENCYdEPARTMENT eNCOUnter      Pt Name: Ana Desai  MRN: [id number]  Flacogfkemar 1980  Date of evaluation: 8/31/2020  Provider:CECI Soto    CHIEF COMPLAINT       Chief Complaint   Patient presents with    Wound Infection     in gluteal fold         HISTORY OF PRESENT ILLNESS  (Location/Symptom, Timing/Onset, Context/Setting, Quality, Duration, Modifying Factors, Severity.)   Ana Desai is a 38 y.o. male who presents to the emergency department with complaints of araceli-  Rectal abscess. Patient was sent here by urgent care nurse practitioner was concern for possible tract or tunneling from abscess about the left side gluteal cleft into rectum as the erythema surrounding goes up into his rectum. He is a type II diabetic he feels it is well controlled 334 blood glucose. Normal kidney function last visit on 6 July he denies any fever he somewhat tachycardic during triage. Morbidly obese he denies any other complaints including GI output changes. He noticed these a couple days ago admits to 1 abscess before on his bottom from long periods while riding 4 [employer] that resolved spontaneously no current antibiotics on board. [facility]    Nursing Notes were reviewed and I agree. REVIEW OF SYSTEMS    (2-9 systems for level 4, 10 or more for level 5)     Review of Systems   Constitutional: Negative for activity change, appetite change, chills and fever. HENT: Negative for congestion and dental problem. Eyes: Negative for photophobia, discharge and itching. Respiratory: Negative for apnea, cough and shortness of breath. Cardiovascular: Negative for chest pain. Musculoskeletal: Negative for arthralgias, back pain, gait problem, myalgias and neck pain. Skin: Positive for wound. Negative for color change, pallor and rash. Neurological: Negative for dizziness, seizures and syncope. Psychiatric/Behavioral: Negative for agitation. The patient is not nervous/anxious. Except as noted above the remainder of the review of systems was reviewed and negative. PAST MEDICAL HISTORY     Past Medical History:   Diagnosis Date    Degenerative disc disease at L5-S1 level     Hyperlipidemia     Nerve pain     Type 2 diabetes mellitus without complication ([facility] [address]          SURGICAL HISTORY       Past Surgical History:   Procedure Laterality Date    INCISION AND DRAINAGE Right 7/7/2016    KNEE IRRIGATION AND DEBRIDEMENT  performed by Leah Kaur MD at [address] Right 5/31/2016    PERONEAL NERVE RELEASE ; RIGHT FIBULAR HEAD  performed by Leah Kaur MD at [address] [place] [address]       Discharge Medication List as of 9/5/2020 11:49 AM      CONTINUE these medications which have NOT CHANGED    Details   glipiZIDE (GLUCOTROL XL) 5 MG extended release tablet Take 1 tablet by mouth daily (with breakfast), Disp-30 tablet,R-1Normal      Cetirizine HCl (ZYRTEC ALLERGY) 10 MG CAPS Take 10 mg by mouth nightly, Disp-90 capsule, R-1Normal      nateglinide (STARLIX) 120 MG tablet Take 1 tablet by mouth 3 times daily (before meals), Disp-270 tablet, R-3Normal      gabapentin (NEURONTIN) 300 MG capsule Take 1 capsule by mouth 3 times daily for 30 days. , Disp-90 capsule, R-5Normal      metFORMIN (GLUCOPHAGE) 1000 MG tablet Take 1 tablet by mouth 2 times daily (with meals), Disp-180 tablet, R-3Normal      diclofenac (VOLTAREN) 50 MG EC tablet TAKE 1 TABLET BY MOUTH THREE TIMES DAILY WITH MEALS, Disp-90 tablet, R-3Normal      aspirin EC 81 MG EC tablet Take 1 tablet by mouth daily, Disp-90 tablet, R-3Normal      fluticasone (FLONASE) 50 MCG/ACT nasal spray 1 spray by Each Nostril route daily, Disp-1 Bottle, R-2Normal             ALLERGIES     Patient has no known allergies.     FAMILY HISTORY       Family History   Problem Relation Age of Onset    Other Mother         blockage    High Blood Pressure Father     Diabetes Father           SOCIAL HISTORY       Social History     Socioeconomic History    Marital status:      Spouse name: None    Number of children: None    Years of education: None    Highest education level: None   Occupational History    None   Social Needs    Financial resource strain: None    Food insecurity     Worry: None     Inability: None    Transportation needs     Medical: None     Non-medical: None   Tobacco Use    Smoking status: Current Every Day Smoker     Packs/day: 1.00     Years: 20.00     Pack years: 20.00     Types: Cigarettes    Smokeless tobacco: Never Used   Substance and Sexual Activity    Alcohol use: Yes     Comment: occ    Drug use: No    Sexual activity: None   Lifestyle    Physical activity     Days per week: None     Minutes per session: None    Stress: None   Relationships    Social connections     Talks on phone: None     Gets together: None     Attends Congregational service: None     Active member of club or organization: None     Attends meetings of clubs or organizations: None     Relationship status: None    Intimate partner violence     Fear of current or ex partner: None     Emotionally abused: None     Physically abused: None     Forced sexual activity: None   Other Topics Concern    None   Social History Narrative    None       SCREENINGS    Juanita Coma Scale  Eye Opening: Spontaneous  Best Verbal Response: Oriented  Best Motor Response: Obeys commands  [place] [employer] Coma Scale Score: 15      PHYSICAL EXAM    (up to 7 forlevel 4, 8 or more for level 5)     ED Triage Vitals   BP Temp Temp Source Pulse Resp SpO2 Height Weight   08/31/20 1324 08/31/20 1324 08/31/20 1804 08/31/20 1324 08/31/20 1324 08/31/20 1324 08/31/20 1324 08/31/20 1324   (!) 148/82 96.7 °F (35.9 °C) Oral 119 18 97 % 6' 1\" (1.854 m) (!) 401 lb (181.9 kg)       Physical Exam  Vitals signs and nursing note reviewed. Constitutional:       General: He is not in acute distress. Appearance: He is well-developed. He is obese.  He is not diaphoretic. HENT:      Head: Normocephalic and atraumatic. Right Ear: External ear normal.      Left Ear: External ear normal.   Eyes:      Pupils: Pupils are equal, round, and reactive to light. Neck:      Musculoskeletal: Normal range of motion and neck supple. Trachea: No tracheal deviation. Cardiovascular:      Rate and Rhythm: Normal rate and regular rhythm. Heart sounds: Normal heart sounds. No murmur. Pulmonary:      Effort: Pulmonary effort is normal.      Breath sounds: Normal breath sounds. No stridor. No wheezing. Chest:      Chest wall: No tenderness. Abdominal:      General: Bowel sounds are normal. There is no distension. Palpations: Abdomen is soft. Tenderness: There is no abdominal tenderness. Genitourinary:     Rectum: Normal.       Musculoskeletal: Normal range of motion. General: Tenderness present. Skin:     General: Skin is warm and dry. Capillary Refill: Capillary refill takes less than 2 seconds. Findings: Erythema present. Neurological:      General: No focal deficit present. Mental Status: He is alert and oriented to person, place, and time. Mental status is at baseline. Psychiatric:         Mood and Affect: Mood normal.         Behavior: Behavior normal.         Thought Content: Thought content normal.         Judgment: Judgment normal.           DIAGNOSTIC RESULTS     RADIOLOGY:   Non-plain film images such as CT, Ultrasound and MRI are read by the radiologist. Plain radiographic images are visualized and preliminarilyinterpreted by No att. providers found with the below findings:      Interpretation per the Radiologist below, if available at the time of this note:    CT ABDOMEN PELVIS W IV CONTRAST Additional Contrast? None   Final Result   1. LEFT gluteal soft tissue inflammation extending to the LEFT   posterior anus. No visible fistulous tract or large abscess.  No   evidence of deep extension into the perirectal soft tissues or   involvement of the rectum. 2.  The liver is steatotic. Signed by Dr Horacio Shearer on 8/31/2020 3:39 PM          LABS:  Labs Reviewed   CULTURE, ANAEROBIC AND AEROBIC - Abnormal; Notable for the following components:       Result Value    Gram Stain Result   (*)     Value: Few WBC's (Polymorphonuclear)  Few Gram positive cocci  in pairs      Organism Staph aureus MRSA (*)     WOUND/ABSCESS   (*)     Value:  Moderate growth  CONTACT PRECAUTIONS INDICATED      Organism Strep pyogenes (Beta Strep Group A) (*)     All other components within normal limits    Narrative:     ORDER#: [id number]                          ORDERED BY: HIREN HOUGH  SOURCE: Abscess buttock                    COLLECTED:  08/31/20 16:40  ANTIBIOTICS AT [facility].:                      RECEIVED :  08/31/20 16:42   CBC WITH AUTO DIFFERENTIAL - Abnormal; Notable for the following components:    WBC 14.6 (*)     RBC 4.52 (*)     Hemoglobin 13.3 (*)     Hematocrit 40.6 (*)     MCHC 32.8 (*)     Neutrophils % 83.5 (*)     Lymphocytes % 9.1 (*)     Neutrophils Absolute 12.2 (*)     All other components within normal limits   COMPREHENSIVE METABOLIC PANEL W/ REFLEX TO MG FOR LOW K - Abnormal; Notable for the following components:    Glucose 332 (*)     All other components within normal limits   LACTATE, SEPSIS - Abnormal; Notable for the following components:    Lactic Acid, Sepsis 3.4 (*)     All other components within normal limits    Narrative:     CALL  Vargas  KLED tel. ,  Chemistry results called to and read back by Delon/RN/ER, 08/31/2020 14:21, by  JASON   CBC WITH AUTO DIFFERENTIAL - Abnormal; Notable for the following components:    WBC 12.0 (*)     RBC 4.47 (*)     Hemoglobin 13.1 (*)     Hematocrit 39.6 (*)     Neutrophils % 75.1 (*)     Lymphocytes % 15.1 (*)     Neutrophils Absolute 9.0 (*)     All other components within normal limits   BASIC METABOLIC PANEL - Abnormal; Notable for the following components:    Glucose 206 (*) All other components within normal limits   CBC - Abnormal; Notable for the following components:    RBC 4.42 (*)     Hemoglobin 13.0 (*)     Hematocrit 38.5 (*)     All other components within normal limits    Narrative:     Collection has been rescheduled by Nabil Ambrose at 09/02/2020 03:18 Reason:   [address]   BASIC METABOLIC PANEL W/ REFLEX TO MG FOR LOW K - Abnormal; Notable for the following components:    Glucose 182 (*)     All other components within normal limits    Narrative:     Collection has been rescheduled by Nabil Ambrose at 09/02/2020 03:18 Reason:   [address]   CBC - Abnormal; Notable for the following components:    RBC 4.31 (*)     Hemoglobin 12.7 (*)     Hematocrit 39.0 (*)     MCHC 32.6 (*)     All other components within normal limits   BASIC METABOLIC PANEL W/ REFLEX TO MG FOR LOW K - Abnormal; Notable for the following components:    Glucose 171 (*)     All other components within normal limits   VANCOMYCIN, TROUGH - Abnormal; Notable for the following components:    Vancomycin Tr 31.1 (*)     All other components within normal limits    Narrative:     Samreen Del Toro tel. [phone],  Chemistry results called to and read back by Megan/GINNY/ORT, 09/03/2020  21:07, by Sharon Stinson   CBC - Abnormal; Notable for the following components:    WBC 11.3 (*)     RBC 4.33 (*)     Hemoglobin 12.8 (*)     Hematocrit 39.1 (*)     MCHC 32.7 (*)     All other components within normal limits   BASIC METABOLIC PANEL W/ REFLEX TO MG FOR LOW K - Abnormal; Notable for the following components:    Glucose 154 (*)     CREATININE 1.6 (*)     GFR Non- 48 (*)     GFR  58 (*)     All other components within normal limits   CBC - Abnormal; Notable for the following components:    RBC 4.35 (*)     Hemoglobin 12.7 (*)     Hematocrit 39.7 (*)     MCHC 32.0 (*)     Platelets 987 (*)     All other components within normal limits   BASIC METABOLIC PANEL W/ REFLEX TO MG FOR LOW K - Abnormal; Notable for the following components:    Sodium 146 (*)     Glucose 124 (*)     CREATININE 1.6 (*)     GFR Non- 48 (*)     GFR  58 (*)     All other components within normal limits   POCT GLUCOSE - Abnormal; Notable for the following components:    POC Glucose 175 (*)     All other components within normal limits   POCT GLUCOSE - Abnormal; Notable for the following components:    POC Glucose 200 (*)     All other components within normal limits   POCT GLUCOSE - Abnormal; Notable for the following components:    POC Glucose 207 (*)     All other components within normal limits   POCT GLUCOSE - Abnormal; Notable for the following components:    POC Glucose 205 (*)     All other components within normal limits   POCT GLUCOSE - Abnormal; Notable for the following components:    POC Glucose 187 (*)     All other components within normal limits   POCT GLUCOSE - Abnormal; Notable for the following components:    POC Glucose 235 (*)     All other components within normal limits   POCT GLUCOSE - Abnormal; Notable for the following components:    POC Glucose 113 (*)     All other components within normal limits   POCT GLUCOSE - Abnormal; Notable for the following components:    POC Glucose 210 (*)     All other components within normal limits   POCT GLUCOSE - Abnormal; Notable for the following components:    POC Glucose 166 (*)     All other components within normal limits   POCT GLUCOSE - Abnormal; Notable for the following components:    POC Glucose 172 (*)     All other components within normal limits   POCT GLUCOSE - Abnormal; Notable for the following components:    POC Glucose 145 (*)     All other components within normal limits   POCT GLUCOSE - Abnormal; Notable for the following components:    POC Glucose 190 (*)     All other components within normal limits   POCT GLUCOSE - Abnormal; Notable for the following components:    POC Glucose 160 (*)     All other components within normal limits   POCT COURSE and DIFFERENTIAL DIAGNOSIS/MDM:   Vitals:    Vitals:    09/04/20 1852 09/04/20 2010 09/05/20 0431 09/05/20 0637   BP: (!) 154/96  134/83 (!) 141/87   Pulse: 87 96 75 80   Resp: 17  17 14   Temp: 98.2 °F (36.8 °C)  97.3 °F (36.3 °C) 97 °F (36.1 °C)   TempSrc: Temporal  Temporal Temporal   SpO2: 95%  94% 96%   Weight:       Height:             MDM  I open wounds more and irrigate with saline I spoke with Dr. Khushi Cabello who is on-call for general surgery agrees that this is the appropriate plan of clinic care without any actual tracking to the rectum with all this being superficial IV antibiotics and warm compress should resolve the issue. We started antibiotics and he will be admitted to the hospitalist care with consult general surgery. My attending is in agreement. PROCEDURES:    Incision/Drainage    Date/Time: 9/6/2020 10:10 AM  Performed by: CECI Lorenzo  Authorized by: Emily Cui MD     Consent:     Consent obtained:  Verbal    Consent given by:  Patient    Risks discussed:  Incomplete drainage, infection and pain  Location:     Type:  Abscess    Location:  Anogenital    Anogenital location:  Gluteal cleft  Pre-procedure details:     Skin preparation:  Betadine  Anesthesia (see MAR for exact dosages): Anesthesia method:  Local infiltration    Local anesthetic:  Bupivacaine 0.5% w/o epi  Procedure type:     Complexity:  Simple  Procedure details:     Needle aspiration: no      Incision types:  Stab incision    Scalpel blade:  11    Wound management:  Irrigated with saline    Drainage:  Purulent and bloody    Drainage amount: Moderate    Wound treatment:  Wound left open    Packing materials:  None  Post-procedure details:     Patient tolerance of procedure: Tolerated well, no immediate complications          FINAL IMPRESSION      1. Abscess of anal or rectal region    2. Poorly controlled diabetes mellitus ([address]    3. Morbid obesity ([address]    4.  Sepsis due to cellulitis (HCC)    5. Elevated serum creatinine    6. Abscess, gluteal, left          DISPOSITION/PLAN   DISPOSITION Admitted 08/31/2020 05:21:49 PM      PATIENT REFERRED TO:  Roxy Mackey MD  [address] [place] [address] [id number]  [place] [place] [id number] [facility], [address] [place] [address] [phone]      Call for an appointment on tuesday to be made no later than Friday September [address] [place], [place]  [address] [facility] [address] [facility]  [phone]      Call for a follow up appointment to be made 1 week after discharge. DISCHARGE MEDICATIONS:  Discharge Medication List as of 9/5/2020 11:49 AM      START taking these medications    Details   HYDROcodone-acetaminophen (NORCO) 7.5-325 MG per tablet Take 1 tablet by mouth every 6 hours as needed for Pain for up to 3 days. , Disp-12 tablet,R-0Print      docusate sodium (COLACE, DULCOLAX) 100 MG CAPS Take 100 mg by mouth 2 times daily for 10 days, Disp-20 capsule,R-0Normal      senna (SENOKOT) 8.6 MG tablet Take 1 tablet by mouth 2 times daily, Disp-60 tablet,R-0Normal      doxycycline hyclate (VIBRA-TABS) 100 MG tablet Take 1 tablet by mouth 2 times daily for 7 days, Disp-14 tablet,R-0Normal      cephALEXin (KEFLEX) 500 MG capsule Take 2 capsules by mouth 3 times daily for 7 days, Disp-42 capsule,R-0Normal             (Please note that portions of this note were completed with a voice recognition program.  Efforts were made to edit the dictations but occasionallywords are mis-transcribed.)    Florentino Garcia 6, [place]  09/06/20 1011

## 2020-09-09 ENCOUNTER — TELEPHONE (OUTPATIENT)
Dept: INTERNAL MEDICINE | Age: 40
End: 2020-09-09

## 2020-09-09 NOTE — TELEPHONE ENCOUNTER
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    Generalized anxiety disorder    Psychophysiological insomnia    Hyperlipidemia associated with type 2 diabetes mellitus ([facility] [address]    Type 2 diabetes mellitus with diabetic polyneuropathy, without long-term current use of insulin ([address]    Morbid obesity with BMI of 50.0-59.9, adult ([address]    Sepsis ([address]    Uncontrolled type 2 diabetes mellitus with hyperglycemia ([facility] [address]    Cigarette nicotine dependence with nicotine-induced disorder    Tobacco abuse counseling    Personal history of noncompliance with medical treatment and regimen  Resolved Problems:    * No resolved hospital problems. *    Admit Date:2020   Discharge Date: 20       Spoke with: patient    Discharge department/facility: [facility]    [facility] Interactive Patient Contact:  Was patient able to fill all prescriptions: Yes  Was patient instructed to bring all medications to the follow-up visit: Yes  Is patient taking all medications as directed in the discharge summary? Yes  Does patient understand their discharge instructions: Yes  Does patient have questions or concerns that need addressed prior to 7-14 day follow up office visit: no    Per patient he isn't able to do much. He said he is pretty much laying around, changing his bandages and taking his medications. He did c/o dizziness all the time. He said this occurs even when he is laying down, not just with position changes. He said it's not new and wonders if any of his medications could be to blame. He is taking his antibiotic with food, but states he really isn't eating much. Patient sees Dr. Geovanny Giron tomorrow.   I advised patient that we needed to r/s his appt with Dr. Wil Finnegan on Monday. He declined offer to see any of the NP or Dr. Jesus Manuel Mix. He only wants to see  [facility] d/t the area of where the wound is located. Discussed with Dr. Franko Givens MA and was able to r/s patient for next 9/16.        Scheduled appointment with PCP within 7-14 days    Follow Up  Future Appointments   Date Time Provider Soraida Leei   8/54/8928 02:03 AM Matt Canada DO [facility]   9/16/2020 12:00 PM [facility]       April D Yash Pace LPN

## 2020-09-09 NOTE — TELEPHONE ENCOUNTER
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    Generalized anxiety disorder    Psychophysiological insomnia    Hyperlipidemia associated with type 2 diabetes mellitus ([facility] [address]    Type 2 diabetes mellitus with diabetic polyneuropathy, without long-term current use of insulin ([facility] [address]    Morbid obesity with BMI of 50.0-59.9, adult ([facility] [address]    Sepsis ([facility] [address]    Uncontrolled type 2 diabetes mellitus with hyperglycemia ([facility] [address]    Cigarette nicotine dependence with nicotine-induced disorder    Tobacco abuse counseling    Personal history of noncompliance with medical treatment and regimen  Resolved Problems:    * No resolved hospital problems. *    Admit Date:2020  Discharge Date: 20       Spoke with: unable to reach patient, first attempt.    Left voicemail requesting a return call    Discharge department/facility: [facility]        Scheduled appointment with PCP within 7-14 days    Follow Up  Future Appointments   Date Time Provider Soraida Horton   4221 09:49 AM Earl Hyde DO [facility] [place]   2020 10:00 AM [facility]        Nikki Mohan LPN

## 2020-09-10 ENCOUNTER — OFFICE VISIT (OUTPATIENT)
Dept: SURGERY | Age: 40
End: 2020-09-10
Payer: COMMERCIAL

## 2020-09-10 VITALS
TEMPERATURE: 97 F | WEIGHT: 315 LBS | OXYGEN SATURATION: 98 % | BODY MASS INDEX: 41.75 KG/M2 | HEIGHT: 73 IN | HEART RATE: 89 BPM

## 2020-09-10 PROCEDURE — 99212 OFFICE O/P EST SF 10 MIN: CPT | Performed by: SURGERY

## 2020-09-10 PROCEDURE — 3052F HG A1C>EQUAL 8.0%<EQUAL 9.0%: CPT | Performed by: SURGERY

## 2020-09-10 ASSESSMENT — ENCOUNTER SYMPTOMS
SORE THROAT: 0
EYE REDNESS: 0
BLOOD IN STOOL: 1
ABDOMINAL PAIN: 0
SHORTNESS OF BREATH: 0
CHEST TIGHTNESS: 0
COUGH: 0
EYE PAIN: 0
BACK PAIN: 1
ABDOMINAL DISTENTION: 0
DIARRHEA: 0
VOMITING: 0
NAUSEA: 0
COLOR CHANGE: 0
ANAL BLEEDING: 1
CONSTIPATION: 1

## 2020-09-10 NOTE — PROGRESS NOTES
SUBJECTIVE:  Mr. Ron Garcia is a 38 y.o. male who presents today for hospital follow up from a araceli-anal abscess. They have been changing the packing as instructed it home. He notes minimal drainage. His pain is tolerable. He is not smoking. He complains of some dizziness that improves with food. Patient's medications, allergies, past medical, surgical, social and family histories werereviewed and updated as appropriate. Review of Systems   Constitutional: Positive for activity change, appetite change, diaphoresis, fatigue and unexpected weight change. Negative for fever. HENT: Positive for congestion. Negative for hearing loss, nosebleeds and sore throat. Eyes: Negative for pain, redness and visual disturbance. Respiratory: Negative for cough, chest tightness and shortness of breath. Cardiovascular: Negative for chest pain, palpitations and leg swelling. Gastrointestinal: Positive for anal bleeding, blood in stool and constipation. Negative for abdominal distention, abdominal pain, diarrhea, nausea and vomiting. Endocrine: Negative for cold intolerance, heat intolerance and polydipsia. Genitourinary: Positive for frequency. Negative for difficulty urinating and urgency. Musculoskeletal: Positive for arthralgias, back pain and gait problem. Negative for joint swelling and neck pain. Skin: Negative for color change, rash and wound. Allergic/Immunologic: Negative for environmental allergies and food allergies. Neurological: Positive for dizziness and light-headedness. Negative for seizures and headaches. Hematological: Negative for adenopathy. Does not bruise/bleed easily. Psychiatric/Behavioral: Negative for confusion, sleep disturbance and suicidal ideas. The patient is nervous/anxious. OBJECTIVE:  Pulse 89   Temp 97 °F (36.1 °C) (Temporal)   Ht 6' 1\" (1.854 m)   Wt (!) 401 lb (181.9 kg)   SpO2 98%   BMI 52.91 kg/m²   Physical Exam  Vitals signs reviewed.    Constitutional: Appearance: He is well-developed. He is obese. He is diaphoretic. HENT:      Head: Normocephalic and atraumatic. Eyes:      Pupils: Pupils are equal, round, and reactive to light. Neck:      Musculoskeletal: Normal range of motion and neck supple. Cardiovascular:      Rate and Rhythm: Normal rate and regular rhythm. Heart sounds: Normal heart sounds. Pulmonary:      Effort: Pulmonary effort is normal.      Breath sounds: Normal breath sounds. No wheezing or rales. Abdominal:      General: There is no distension. Palpations: Abdomen is soft. There is no mass. Tenderness: There is no abdominal tenderness. There is no guarding or rebound. Genitourinary:     Comments: Kat-anal incision is clean and dry without discharge or odor. Packing in place. Dressing is clean and dry. Musculoskeletal: Normal range of motion. Lymphadenopathy:      Cervical: No cervical adenopathy. Skin:     General: Skin is warm. Neurological:      Mental Status: He is alert and oriented to person, place, and time. Psychiatric:         Behavior: Behavior normal.         Thought Content: Thought content normal.         Judgment: Judgment normal.         ASSESSMENT:   Diagnosis Orders   1. Perianal abscess     2. Uncontrolled type 2 diabetes mellitus with hyperglycemia ([facility]     3. Morbid obesity with BMI of 50.0-59.9, adult ([facility]         PLAN:    Recommend PCP f/u re: dizziness. Likely this is related to his lower blood sugars. Continue wound care daily. Wound defer working for another month to allow healing, as he is a  and prolonged sitting on this area could delay healing. Continued smoking cessation. No follow-ups on file.     DO Bob 1/37/5967 1:15 PM

## 2020-09-16 ENCOUNTER — OFFICE VISIT (OUTPATIENT)
Dept: PRIMARY CARE CLINIC | Age: 40
End: 2020-09-16
Payer: COMMERCIAL

## 2020-09-16 VITALS
TEMPERATURE: 96.4 F | HEART RATE: 88 BPM | WEIGHT: 315 LBS | DIASTOLIC BLOOD PRESSURE: 88 MMHG | SYSTOLIC BLOOD PRESSURE: 132 MMHG | HEIGHT: 78 IN | OXYGEN SATURATION: 98 % | BODY MASS INDEX: 36.45 KG/M2

## 2020-09-16 PROCEDURE — 11042 DBRDMT SUBQ TIS 1ST 20SQCM/<: CPT | Performed by: FAMILY MEDICINE

## 2020-09-16 PROCEDURE — 83036 HEMOGLOBIN GLYCOSYLATED A1C: CPT | Performed by: FAMILY MEDICINE

## 2020-09-16 PROCEDURE — 99214 OFFICE O/P EST MOD 30 MIN: CPT | Performed by: FAMILY MEDICINE

## 2020-09-16 PROCEDURE — 1111F DSCHRG MED/CURRENT MED MERGE: CPT | Performed by: FAMILY MEDICINE

## 2020-09-16 PROCEDURE — 3052F HG A1C>EQUAL 8.0%<EQUAL 9.0%: CPT | Performed by: FAMILY MEDICINE

## 2020-09-16 ASSESSMENT — PATIENT HEALTH QUESTIONNAIRE - PHQ9
1. LITTLE INTEREST OR PLEASURE IN DOING THINGS: 1
2. FEELING DOWN, DEPRESSED OR HOPELESS: 1
SUM OF ALL RESPONSES TO PHQ QUESTIONS 1-9: 2
SUM OF ALL RESPONSES TO PHQ9 QUESTIONS 1 & 2: 2
SUM OF ALL RESPONSES TO PHQ QUESTIONS 1-9: 2

## 2020-09-16 NOTE — PROGRESS NOTES
Elliot Bruner is a 38 y.o. male who presents today for   Chief Complaint   Patient presents with    Follow-Up from Hospital     abscess, left gluteal    Other     Diabetic foot exam       HPI  Patient is here for hospital follow-up for gluteal abscess. He has had follow-up with general surgeon since then. He is healing well. He continues to pack the gluteal abscess. He is not having any fever or chills. Still has some fatigue but has been improving. Patient is due for diabetes follow-up today as well. No change in PMH, family, social, or surgical history unless mentioned above. Review of Systems   Constitutional: Negative for chills and fever. Respiratory: Negative for cough, chest tightness, shortness of breath and wheezing. Cardiovascular: Negative for chest pain, palpitations and leg swelling. Gastrointestinal: Negative for abdominal pain, constipation, diarrhea, nausea and vomiting. Genitourinary: Negative for difficulty urinating, dysuria and frequency. Musculoskeletal: Positive for arthralgias, back pain and gait problem. Negative for joint swelling. Skin: Positive for wound. Negative for color change. Neurological: Positive for numbness. Negative for seizures. Past Medical History:   Diagnosis Date    Degenerative disc disease at L5-S1 level     Hyperlipidemia     Nerve pain     Type 2 diabetes mellitus without complication (HCC)        Current Outpatient Medications   Medication Sig Dispense Refill    Diabetic Shoe MISC by Does not apply route DISPENSE ONE PAIR DIABETIC SHOES AND THREE PAIRS OF HEAT MOLDED INSERTS 1 each 0    Cetirizine HCl (ZYRTEC ALLERGY) 10 MG CAPS Take 10 mg by mouth nightly 90 capsule 1    nateglinide (STARLIX) 120 MG tablet Take 1 tablet by mouth 3 times daily (before meals) 270 tablet 3    gabapentin (NEURONTIN) 300 MG capsule Take 1 capsule by mouth 3 times daily for 30 days.  90 capsule 5    metFORMIN (GLUCOPHAGE) 1000 MG tablet Take 1 murmur. No friction rub. No gallop. Pulmonary:      Effort: Pulmonary effort is normal. No respiratory distress. Breath sounds: Normal breath sounds. No wheezing or rales. Chest:      Chest wall: No tenderness. Abdominal:      General: Bowel sounds are normal. There is no distension. Palpations: Abdomen is soft. There is no mass. Tenderness: There is no abdominal tenderness. There is no guarding or rebound. Comments: Central obesity   Musculoskeletal:      Right lower leg: No edema. Left lower leg: No edema. Right foot: No Charcot foot. Left foot: No Charcot foot. Feet:      Right foot:      Skin integrity: Ulcer (dime size 2x 2 circular w/ eschar full skin thiickness) present. No blister. Skin:     General: Skin is warm and dry. Findings: Abscess (healing perigluteal cleft abscess packed approx 2 cm diam) present. No abrasion or bruising. Nails: There is no clubbing. Neurological:      Mental Status: He is alert and oriented to person, place, and time. Coordination: Coordination normal.      Gait: Gait normal.          Monofilament Exam Reveals:  Pulses: normal  Edema:normal  Skin Lesions: L foot posterior calcaneal ulcer stage 2, R foot dermatitis   Right Foot:    Left Foot:  Normal sensation at 0   Normal sensation at 0   Diminished sensation at 1-10   Diminished sensation at 1-10   No sensation at 0    No sensation at 0             Assessment:    ICD-10-CM    1. Type 2 diabetes mellitus with diabetic polyneuropathy, without long-term current use of insulin ([facility])  E11.42 POCT glycosylated hemoglobin (Hb A1C)      DIABETES FOOT EXAM     Diabetic Shoe MISC     ID DISCHARGE MEDS RECONCILED W/ CURRENT OUTPATIENT MED LIST   2. Diabetic ulcer of right heel associated with type 2 diabetes mellitus, limited to breakdown of skin ([facility]  E11.621 Diabetic Shoe MISC    L97.411 ID DEBRIDEMENT, SKIN, SUB-Q TISSUE,=<20 SQ CM   3. Abscess  L02.91    4. Decreased sensation of foot  R20.8 Diabetic Shoe MISC       Plan:   Abscess is healing well. Patient to continue seeing general surgery for this at her to contact me if any issues. Patient does have stage II ulcer found on his foot. This needs debridement. She needs diabetic shoes based on decreased sensation of foot ulceration. He will use these daily as prescribed. WOUND DEBRIDEMENT: Excisional debridement w/ live margins [id number] (first 20 cm)  Patient received informed consent of the procedure discussing the benefits and risk of the procedure. Patient did agree to both verbal and written informed consent for this procedure. Patient's wound and surrounding margins were cleansed with Betadine and then rubbing alcohol for the injection site for anesthesia. 1% lidocaine without epinephrine was then injected with a 30-gauge half inch needle locally to edges of the wound. After local anesthetic applied, a dermal currette was use to remove the eschar and into the dermis and abrading the most superficial subcutaneous tissue without substantial removal of subcutaneous fat or fascia. It was then used around the edges of the wound for removal of superficial epidermis. The entire wound and border was gently debrided until mild bleeding occurred. Bleeding was controlled with compression. The wound was then dressed. Area of debridement was approximately 4 square cm. Blood loss was approximately 1 cc. The patient tolerated the procedure well and without any major complications.     Orders Placed This Encounter   Procedures    POCT glycosylated hemoglobin (Hb A1C)    HM DIABETES FOOT EXAM    MI DISCHARGE MEDS RECONCILED W/ CURRENT OUTPATIENT MED LIST    MI DEBRIDEMENT, SKIN, SUB-Q TISSUE,=<20 SQ CM     Orders Placed This Encounter   Medications    Diabetic Shoe MISC     Sig: by Does not apply route DISPENSE ONE PAIR DIABETIC SHOES AND THREE PAIRS OF HEAT MOLDED INSERTS     Dispense:  1 each     Refill:  0

## 2020-09-28 ENCOUNTER — TELEPHONE (OUTPATIENT)
Dept: SURGERY | Age: 40
End: 2020-09-28

## 2020-09-28 ASSESSMENT — ENCOUNTER SYMPTOMS
COUGH: 0
DIARRHEA: 0
CONSTIPATION: 0
VOMITING: 0
CHEST TIGHTNESS: 0
COLOR CHANGE: 0
WHEEZING: 0
SHORTNESS OF BREATH: 0
NAUSEA: 0
ABDOMINAL PAIN: 0
BACK PAIN: 1

## 2020-09-28 NOTE — TELEPHONE ENCOUNTER
I s/w patient, he said incision area is more painful & draining some greenish color-per wife who's taking care of this area for him due to where it is on him. He wants it checked out to make sure no infection is occurring etc. I gave him appt with Dr Renuka Giron for tomorrow.

## 2020-09-29 ENCOUNTER — OFFICE VISIT (OUTPATIENT)
Dept: SURGERY | Age: 40
End: 2020-09-29
Payer: COMMERCIAL

## 2020-09-29 VITALS
TEMPERATURE: 97 F | OXYGEN SATURATION: 99 % | BODY MASS INDEX: 41.75 KG/M2 | HEIGHT: 73 IN | WEIGHT: 315 LBS | HEART RATE: 102 BPM

## 2020-09-29 PROCEDURE — 99212 OFFICE O/P EST SF 10 MIN: CPT | Performed by: SURGERY

## 2020-09-29 PROCEDURE — 3052F HG A1C>EQUAL 8.0%<EQUAL 9.0%: CPT | Performed by: SURGERY

## 2020-09-29 NOTE — PROGRESS NOTES
S: Mr. Alisa Briggs returns today for a post op visit one month weeks s/p perineal abscess drainage in the hospital. Since hospital discharge he has done well. His post op pain has resolved. No fever or chills reported. His wife is packing his wound for him, and recently when she packs it, it will bleed. Recently it became very sore and burned. There was some small amount of drainage that was thick and purulent. O: left araceli-anal incision is healing nicely. There is clean granulation tissue without purulence or discharge. Abdomen is soft and non tender. No mass appreciated, bowel sounds are normal. Obese. A: 1) Satisfactory recovery s/p araceli-anal abscess drainage. P: 1) Continue with usual diet and activity. May stop packing the wound. 2) Follow up prn.

## 2020-11-23 RX ORDER — GLIPIZIDE 5 MG/1
5 TABLET, FILM COATED, EXTENDED RELEASE ORAL 2 TIMES DAILY
Qty: 60 TABLET | Refills: 2 | Status: SHIPPED | OUTPATIENT
Start: 2020-11-23 | End: 2020-12-17

## 2020-12-17 ENCOUNTER — VIRTUAL VISIT (OUTPATIENT)
Dept: PRIMARY CARE CLINIC | Age: 40
End: 2020-12-17
Payer: COMMERCIAL

## 2020-12-17 PROCEDURE — 3052F HG A1C>EQUAL 8.0%<EQUAL 9.0%: CPT | Performed by: FAMILY MEDICINE

## 2020-12-17 PROCEDURE — 99214 OFFICE O/P EST MOD 30 MIN: CPT | Performed by: FAMILY MEDICINE

## 2020-12-17 RX ORDER — SILDENAFIL 100 MG/1
100 TABLET, FILM COATED ORAL PRN
Qty: 8 TABLET | Refills: 5 | Status: SHIPPED | OUTPATIENT
Start: 2020-12-17 | End: 2021-12-26

## 2020-12-17 NOTE — PROGRESS NOTES
2020    TELEHEALTH EVALUATION -- Audio/Visual (During [facility] public health emergency)    HPI:    Rubi Peralta (:  1980) has requested an audio/video evaluation for the following concern(s):    Patient presents today via video visit for DM. Notes that he has been having issues with his taste since hospitalization. Notes he feels like he is having weakness. He is having some issues w/ ED mid intercourse. Review of Systems   Constitutional: Negative for chills, fever and unexpected weight change. Respiratory: Negative for cough, chest tightness, shortness of breath and wheezing. Cardiovascular: Negative for chest pain, palpitations and leg swelling. Gastrointestinal: Negative for abdominal pain, constipation, diarrhea, nausea and vomiting. Genitourinary: Negative for difficulty urinating, discharge, dysuria, enuresis and frequency. Musculoskeletal: Positive for back pain. Negative for gait problem. Neurological: Positive for dizziness. Negative for weakness and numbness. Prior to Visit Medications    Medication Sig Taking? Authorizing Provider   sildenafil (VIAGRA) 100 MG tablet Take 1 tablet by mouth as needed for Erectile Dysfunction Generic sildenafil 100 mg or strongest dose Yes Ruchi Solano MD   Diabetic Shoe MISC by Does not apply route DISPENSE ONE PAIR DIABETIC SHOES AND THREE PAIRS OF HEAT MOLDED INSERTS  Ruchi Solano MD   Cetirizine HCl (ZYRTEC ALLERGY) 10 MG CAPS Take 10 mg by mouth nightly  Ruchi Solano MD   nateglinide (STARLIX) 120 MG tablet Take 1 tablet by mouth 3 times daily (before meals)  Ruchi Solano MD   gabapentin (NEURONTIN) 300 MG capsule Take 1 capsule by mouth 3 times daily for 30 days.   Ruchi Solano MD   metFORMIN (GLUCOPHAGE) 1000 MG tablet Take 1 tablet by mouth 2 times daily (with meals)  Ruchi Solano MD   diclofenac (VOLTAREN) 50 MG EC tablet TAKE 1 TABLET BY MOUTH THREE TIMES DAILY WITH MEALS  Ruchi Solano MD aspirin EC 81 MG EC tablet Take 1 tablet by mouth daily  Praveena Sandhu MD   fluticasone [facility]) 50 MCG/ACT nasal spray 1 spray by Each Nostril route daily  Praveena Sandhu MD       Social History     Tobacco Use    Smoking status: Current Every Day Smoker     Packs/day: 1.00     Years: 20.00     Pack years: 20.00     Types: Cigarettes    Smokeless tobacco: Never Used   Substance Use Topics    Alcohol use: Yes     Comment: occ    Drug use: No        No Known Allergies,   Past Medical History:   Diagnosis Date    Degenerative disc disease at L5-S1 level     Hyperlipidemia     Nerve pain     Type 2 diabetes mellitus without complication (HCC)    ,   Past Surgical History:   Procedure Laterality Date    INCISION AND DRAINAGE Right 7/7/2016    KNEE IRRIGATION AND DEBRIDEMENT  performed by Giancarlo Coe MD at [address] [place] [address] Way Right 5/31/2016    PERONEAL NERVE RELEASE ; RIGHT FIBULAR HEAD  performed by Giancarlo Coe MD at [facility] OR   ,   Social History     Tobacco Use    Smoking status: Current Every Day Smoker     Packs/day: 1.00     Years: 20.00     Pack years: 20.00     Types: Cigarettes    Smokeless tobacco: Never Used   Substance Use Topics    Alcohol use: Yes     Comment: occ    Drug use: No   ,   Family History   Problem Relation Age of Onset    Other Mother         blockage    High Blood Pressure Father     Diabetes Father    ,   Immunization History   Administered Date(s) Administered    Pneumococcal Conjugate 13-valent ([id number]) 08/22/2016    Pneumococcal Polysaccharide ([id number]) 07/07/2020    Tdap (Boostrix, Adacel) 08/22/2016   ,   Health Maintenance   Topic Date Due    Hepatitis C screen  1980    Diabetic retinal exam  08/26/1989    Hepatitis B vaccine (1 of 3 - Risk 3-dose series) 08/26/1998    Flu vaccine (1) 09/01/2020    A1C test (Diabetic or Prediabetic)  07/06/2021    Diabetic microalbuminuria test  07/06/2021    Lipid screen  07/06/2021  Diabetic foot exam  09/16/2021    DTaP/Tdap/Td vaccine (2 - Td) 08/22/2026    Pneumococcal 0-64 years Vaccine  Completed    HIV screen  Completed    Hepatitis A vaccine  Aged Out    Hib vaccine  Aged Out    Meningococcal (ACWY) vaccine  Aged Out       PHYSICAL EXAMINATION:  [ INSTRUCTIONS:  \"[x]\" Indicates a positive item  \"[]\" Indicates a negative item  -- DELETE ALL ITEMS NOT EXAMINED]  Vital Signs: (As obtained by patient/caregiver or practitioner observation)    Blood pressure-  Heart rate-    Respiratory rate-    Temperature-  Pulse oximetry-     Constitutional: [x] Appears well-developed and well-nourished [] No apparent distress      [] Abnormal-   Mental status  [x] Alert and awake  [x] Oriented to person/place/time [x]Able to follow commands      Eyes:  EOM    [x]  Normal  [] Abnormal-  Sclera  [x]  Normal  [] Abnormal -         Discharge []  None visible  [] Abnormal -    HENT:   [x] Normocephalic, atraumatic. [] Abnormal   [x] Mouth/Throat: Mucous membranes are moist.     External Ears [x] Normal  [] Abnormal-     Neck: [x] No visualized mass     Pulmonary/Chest: [x] Respiratory effort normal.  [x] No visualized signs of difficulty breathing or respiratory distress        [] Abnormal-      Musculoskeletal:   [x] Normal gait with no signs of ataxia         [x] Normal range of motion of neck        [] Abnormal-       Neurological:        [x] No Facial Asymmetry (Cranial nerve 7 motor function) (limited exam to video visit)          [x] No gaze palsy        [] Abnormal-         Skin:        [x] No significant exanthematous lesions or discoloration noted on facial skin         [] Abnormal-            Psychiatric:       [x] Normal Affect [x] No Hallucinations        [] Abnormal-     Other pertinent observable physical exam findings-     ASSESSMENT/PLAN:    ICD-10-CM    1.  Type 2 diabetes mellitus with diabetic polyneuropathy, without long-term current use of insulin ([facility])  E11.42 Services were provided through a video synchronous discussion virtually to substitute for in-person clinic visit. Patient and provider were located at their individual homes or provider at secure site for business. It is possible that material may be posted from a notepad that is used for a Dragon dictation device for dictating notes outside the EMR and I am the original author of all of this content. --Candido Schulte MD on 12/23/2020 at 6:48 PM    An electronic signature was used to authenticate this note.

## 2020-12-23 ASSESSMENT — ENCOUNTER SYMPTOMS
CHEST TIGHTNESS: 0
NAUSEA: 0
DIARRHEA: 0
BACK PAIN: 1
WHEEZING: 0
VOMITING: 0
SHORTNESS OF BREATH: 0
COUGH: 0
ABDOMINAL PAIN: 0
CONSTIPATION: 0

## 2021-01-04 DIAGNOSIS — E11.42 TYPE 2 DIABETES MELLITUS WITH DIABETIC POLYNEUROPATHY, WITHOUT LONG-TERM CURRENT USE OF INSULIN (HCC): ICD-10-CM

## 2021-01-04 LAB
ALBUMIN SERPL-MCNC: 4.5 G/DL (ref 3.5–5.2)
ALP BLD-CCNC: 62 U/L (ref 40–130)
ALT SERPL-CCNC: 48 U/L (ref 5–41)
ANION GAP SERPL CALCULATED.3IONS-SCNC: 12 MMOL/L (ref 7–19)
AST SERPL-CCNC: 35 U/L (ref 5–40)
BILIRUB SERPL-MCNC: 0.3 MG/DL (ref 0.2–1.2)
BUN BLDV-MCNC: 15 MG/DL (ref 6–20)
CALCIUM SERPL-MCNC: 10.4 MG/DL (ref 8.6–10)
CHLORIDE BLD-SCNC: 104 MMOL/L (ref 98–111)
CHOLESTEROL, TOTAL: 204 MG/DL (ref 160–199)
CO2: 28 MMOL/L (ref 22–29)
CREAT SERPL-MCNC: 0.8 MG/DL (ref 0.5–1.2)
CREATININE URINE: 169.5 MG/DL (ref 4.2–622)
GFR AFRICAN AMERICAN: >59
GFR NON-AFRICAN AMERICAN: >60
GLUCOSE BLD-MCNC: 132 MG/DL (ref 74–109)
HBA1C MFR BLD: 7 % (ref 4–6)
HDLC SERPL-MCNC: 43 MG/DL (ref 55–121)
LDL CHOLESTEROL CALCULATED: 128 MG/DL
MICROALBUMIN UR-MCNC: 3.1 MG/DL (ref 0–19)
MICROALBUMIN/CREAT UR-RTO: 18.3 MG/G
POTASSIUM SERPL-SCNC: 4.4 MMOL/L (ref 3.5–5)
SODIUM BLD-SCNC: 144 MMOL/L (ref 136–145)
TOTAL PROTEIN: 7.8 G/DL (ref 6.6–8.7)
TRIGL SERPL-MCNC: 164 MG/DL (ref 0–149)

## 2021-01-28 ENCOUNTER — VIRTUAL VISIT (OUTPATIENT)
Dept: PRIMARY CARE CLINIC | Age: 41
End: 2021-01-28
Payer: COMMERCIAL

## 2021-01-28 DIAGNOSIS — R26.89 BALANCE DISORDER: ICD-10-CM

## 2021-01-28 DIAGNOSIS — E11.649 TYPE 2 DIABETES MELLITUS WITH HYPOGLYCEMIA WITHOUT COMA, WITHOUT LONG-TERM CURRENT USE OF INSULIN (HCC): Primary | ICD-10-CM

## 2021-01-28 PROCEDURE — 3051F HG A1C>EQUAL 7.0%<8.0%: CPT | Performed by: FAMILY MEDICINE

## 2021-01-28 PROCEDURE — 99213 OFFICE O/P EST LOW 20 MIN: CPT | Performed by: FAMILY MEDICINE

## 2021-02-05 ENCOUNTER — TELEPHONE (OUTPATIENT)
Dept: PRIMARY CARE CLINIC | Age: 41
End: 2021-02-05

## 2021-02-05 NOTE — TELEPHONE ENCOUNTER
Contacted [facility] to see if we do paternity test, jonathon no longer does.      Called patient and informed him [facility] does not do paternity test but he can reach out to [facility] or potentially [facility]

## 2021-02-05 NOTE — TELEPHONE ENCOUNTER
----- Message from Liseth Dupont MD sent at 1/28/2021 11:16 AM CST -----  Regarding: paternity test  Can we look into a paternity test for pt? He states that he did have someone come up and say that he may be son.

## 2021-02-07 DIAGNOSIS — M54.12 CERVICAL RADICULOPATHY: ICD-10-CM

## 2021-02-08 RX ORDER — GABAPENTIN 300 MG/1
CAPSULE ORAL
Qty: 90 CAPSULE | Refills: 5 | Status: SHIPPED | OUTPATIENT
Start: 2021-02-08 | End: 2021-08-16

## 2021-02-08 ASSESSMENT — ENCOUNTER SYMPTOMS
CHEST TIGHTNESS: 0
ABDOMINAL PAIN: 0
SHORTNESS OF BREATH: 0
COUGH: 0
VOMITING: 0
DIARRHEA: 0
NAUSEA: 0
CONSTIPATION: 0
WHEEZING: 0

## 2021-02-08 NOTE — TELEPHONE ENCOUNTER
DILCIA was reviewed today per office protocol. Report shows No discrepancies. Fill pattern is consistent from single provider(s) at single pharmacy(s).     Presciption Escribed

## 2021-02-09 NOTE — PROGRESS NOTES
2021    TELEHEALTH EVALUATION -- Audio/Visual (During [facility]-19 public health emergency)    HPI:    Erna Cadet (:  1980) has requested an audio/video evaluation for the following concern(s):    Patient presents today via video visit for concern of balance issues. He notes he had trouble yesterday whenever he was leaning over and tried to stand up. He does have a history of ankle pain and mobility issues but his balance has been somewhat worse recently. He notes that he has not had any issues with any syncopal episodes or near syncope. He does have a history of diabetes and has been trying to control this better. He notes that has gotten worse at times. He notes otherwise he is doing quite well and is not having any issues with any wounds as he had issues with this previously. Review of Systems   Constitutional: Negative for chills and fever. Respiratory: Negative for cough, chest tightness, shortness of breath and wheezing. Cardiovascular: Negative for chest pain, palpitations and leg swelling. Gastrointestinal: Negative for abdominal pain, constipation, diarrhea, nausea and vomiting. Genitourinary: Negative for difficulty urinating, dysuria and frequency. Neurological: Positive for dizziness. Negative for light-headedness. Prior to Visit Medications    Medication Sig Taking?  Authorizing Provider   gabapentin (NEURONTIN) 300 MG capsule TAKE 1 CAPSULE BY MOUTH THREE TIMES DAILY  Genny Mcleod MD   sildenafil (VIAGRA) 100 MG tablet Take 1 tablet by mouth as needed for Erectile Dysfunction Generic sildenafil 100 mg or strongest dose  Genny Mcleod MD   Diabetic Shoe MISC by Does not apply route DISPENSE ONE PAIR DIABETIC SHOES AND THREE PAIRS OF HEAT MOLDED INSERTS  Genny Mcleod MD   Cetirizine HCl (ZYRTEC ALLERGY) 10 MG CAPS Take 10 mg by mouth nightly  Genny Mcleod MD nateglinide (STARLIX) 120 MG tablet Take 1 tablet by mouth 3 times daily (before meals)  Chandana Le MD   metFORMIN (GLUCOPHAGE) 1000 MG tablet Take 1 tablet by mouth 2 times daily (with meals)  Chandana Le MD   diclofenac (VOLTAREN) 50 MG EC tablet TAKE 1 TABLET BY MOUTH THREE TIMES DAILY WITH MEALS  Chandana Le MD   aspirin EC 81 MG EC tablet Take 1 tablet by mouth daily  Chandana Le MD   fluticasone [facility]) 50 MCG/ACT nasal spray 1 spray by Each Nostril route daily  Chandana Le MD       Social History     Tobacco Use    Smoking status: Current Every Day Smoker     Packs/day: 1.00     Years: 20.00     Pack years: 20.00     Types: Cigarettes    Smokeless tobacco: Never Used   Substance Use Topics    Alcohol use: Yes     Comment: occ    Drug use: No        No Known Allergies,   Past Medical History:   Diagnosis Date    Degenerative disc disease at L5-S1 level     Hyperlipidemia     Nerve pain     Type 2 diabetes mellitus without complication ([facility]    ,   Past Surgical History:   Procedure Laterality Date    INCISION AND DRAINAGE Right 7/7/2016    KNEE IRRIGATION AND DEBRIDEMENT  performed by Steve Howard MD at [address] [place] [address] Right 5/31/2016    PERONEAL NERVE RELEASE ; RIGHT FIBULAR HEAD  performed by Steve Howard MD at [facility] OR   ,   Social History     Tobacco Use    Smoking status: Current Every Day Smoker     Packs/day: 1.00     Years: 20.00     Pack years: 20.00     Types: Cigarettes    Smokeless tobacco: Never Used   Substance Use Topics    Alcohol use: Yes     Comment: occ    Drug use: No   ,   Family History   Problem Relation Age of Onset    Other Mother         blockage    High Blood Pressure Father     Diabetes Father    ,   Immunization History   Administered Date(s) Administered    Pneumococcal Conjugate 13-valent ([id number]) 08/22/2016    Pneumococcal Polysaccharide ([id number]) 07/07/2020    Tdap (Boostrix, Adacel) 08/22/2016   , Health Maintenance   Topic Date Due    Hepatitis C screen  1980    Diabetic retinal exam  08/26/1989    Hepatitis B vaccine (1 of 3 - Risk 3-dose series) 08/26/1998    Flu vaccine (1) 09/01/2020    Diabetic foot exam  09/16/2021    A1C test (Diabetic or Prediabetic)  01/04/2022    Diabetic microalbuminuria test  01/04/2022    Lipid screen  01/04/2022    DTaP/Tdap/Td vaccine (2 - Td) 08/22/2026    Pneumococcal 0-64 years Vaccine  Completed    HIV screen  Completed    Hepatitis A vaccine  Aged Out    Hib vaccine  Aged Out    Meningococcal (ACWY) vaccine  Aged Out       PHYSICAL EXAMINATION:  [ INSTRUCTIONS:  \"[x]\" Indicates a positive item  \"[]\" Indicates a negative item  -- DELETE ALL ITEMS NOT EXAMINED]  Vital Signs: (As obtained by patient/caregiver or practitioner observation)    Blood pressure-  Heart rate-    Respiratory rate-    Temperature-  Pulse oximetry-     Constitutional: [x] Appears well-developed and well-nourished [] No apparent distress      [] Abnormal-   Mental status  [x] Alert and awake  [x] Oriented to person/place/time [x]Able to follow commands      Eyes:  EOM    [x]  Normal  [] Abnormal-  Sclera  [x]  Normal  [] Abnormal -         Discharge []  None visible  [] Abnormal -    HENT:   [x] Normocephalic, atraumatic.   [] Abnormal   [x] Mouth/Throat: Mucous membranes are moist.     External Ears [x] Normal  [] Abnormal-     Neck: [x] No visualized mass     Pulmonary/Chest: [x] Respiratory effort normal.  [x] No visualized signs of difficulty breathing or respiratory distress        [] Abnormal-      Musculoskeletal:   [x] Normal gait with no signs of ataxia         [x] Normal range of motion of neck        [] Abnormal-       Neurological:        [x] No Facial Asymmetry (Cranial nerve 7 motor function) (limited exam to video visit)          [x] No gaze palsy        [] Abnormal-         Skin:        [x] No significant exanthematous lesions or discoloration noted on facial skin It is possible that material may be posted from a notepad that is used for a Dragon dictation device for dictating notes outside the EMR and I am the original author of all of this content. --Cole Rutledge MD on 2/8/2021 at 8:31 PM    An electronic signature was used to authenticate this note.

## 2021-06-07 RX ORDER — CETIRIZINE HYDROCHLORIDE 10 MG/1
TABLET, FILM COATED ORAL
Qty: 90 TABLET | Refills: 0 | Status: SHIPPED | OUTPATIENT
Start: 2021-06-07 | End: 2021-08-13

## 2021-06-07 NOTE — TELEPHONE ENCOUNTER
Cheyenne Mena called to request a refill on his medication.       Last office visit : 1/28/2021   Next office visit : Visit date not found     Requested Prescriptions     Pending Prescriptions Disp Refills    EQ ALLERGY RELIEF, CETIRIZINE, 10 MG tablet [Pharmacy Med Name: EQ Allergy Relief (Cetirizine) 10 MG Oral Tablet] 90 tablet 0     Sig: Take 1 tablet by mouth nightly            Mary Spurling

## 2021-10-11 NOTE — TELEPHONE ENCOUNTER
Perla Huynh called to request a refill on his medication.       Last office visit : 1/28/2021   Next office visit : Visit date not found     Requested Prescriptions     Signed Prescriptions Disp Refills    diclofenac (VOLTAREN) 50 MG EC tablet 90 tablet 0     Sig: TAKE 1 TABLET BY MOUTH THREE TIMES DAILY WITH MEALS     Authorizing Provider: Cathy Jo User: Rolo Penn metFORMIN (GLUCOPHAGE) 1000 MG tablet 60 tablet 0     Sig: TAKE 1 TABLET BY MOUTH TWICE DAILY WITH MEALS     Authorizing Provider: Cathy Harrington     Ordering User: Butch Davis MA

## 2021-10-20 ENCOUNTER — TELEMEDICINE (OUTPATIENT)
Dept: PRIMARY CARE CLINIC | Age: 41
End: 2021-10-20
Payer: COMMERCIAL

## 2021-10-20 DIAGNOSIS — J01.00 ACUTE NON-RECURRENT MAXILLARY SINUSITIS: Primary | ICD-10-CM

## 2021-10-20 DIAGNOSIS — H92.02 LEFT EAR PAIN: ICD-10-CM

## 2021-10-20 DIAGNOSIS — R09.81 NASAL CONGESTION: ICD-10-CM

## 2021-10-20 PROCEDURE — 99213 OFFICE O/P EST LOW 20 MIN: CPT | Performed by: NURSE PRACTITIONER

## 2021-10-20 RX ORDER — METHYLPREDNISOLONE 4 MG/1
TABLET ORAL
Qty: 1 KIT | Refills: 0 | Status: SHIPPED | OUTPATIENT
Start: 2021-10-20 | End: 2021-10-26

## 2021-10-20 RX ORDER — AZITHROMYCIN 250 MG/1
250 TABLET, FILM COATED ORAL SEE ADMIN INSTRUCTIONS
Qty: 6 TABLET | Refills: 0 | Status: SHIPPED | OUTPATIENT
Start: 2021-10-20 | End: 2021-10-25

## 2021-10-20 ASSESSMENT — ENCOUNTER SYMPTOMS
DIARRHEA: 0
ABDOMINAL PAIN: 0
CHEST TIGHTNESS: 0
NAUSEA: 0
SORE THROAT: 0
COUGH: 1
VOMITING: 0
SHORTNESS OF BREATH: 0
COLOR CHANGE: 0

## 2021-10-20 NOTE — PROGRESS NOTES
Demar Yeboah (:  1980) is a 42 y.o. male,Established patient, here for evaluation of the following chief complaint(s): No chief complaint on file. Patient reports for the last week he has been coughing up thick green colored mucus, left ear pain and congestion. He has been taking nyquil, and robitussin with some relief. He reports he has been taking zyrtec and flonase without relief. He reports he quit smoking in 2020 and occasionally coughs up some black stuff in his phlegm. ASSESSMENT/PLAN:  1. Acute non-recurrent maxillary sinusitis  Take medrol dose pack and azithromycin as directed  2. Left ear pain  Take medrol dose pack and azithromycin as directed  3. Nasal congestion  Take medrol dose pack and azithromycin as directed    Follow up in 1 month for chronic conditions. Return in about 1 month (around 2021) for chronic conditions. SUBJECTIVE/OBJECTIVE:  HPI    Review of Systems   Constitutional: Negative for activity change and fever. HENT: Positive for congestion and ear pain. Negative for sore throat. Respiratory: Positive for cough. Negative for chest tightness and shortness of breath. Cardiovascular: Negative for chest pain. Gastrointestinal: Negative for abdominal pain, diarrhea, nausea and vomiting. Genitourinary: Negative for frequency and urgency. Musculoskeletal: Negative for arthralgias and myalgias. Skin: Negative for color change. Neurological: Negative for dizziness, weakness and numbness. Psychiatric/Behavioral: Negative for agitation. The patient is not nervous/anxious. No flowsheet data found.      Physical Exam    [INSTRUCTIONS:  \"[x]\" Indicates a positive item  \"[]\" Indicates a negative item  -- DELETE ALL ITEMS NOT EXAMINED]    Constitutional: [x] Appears well-developed and well-nourished [x] No apparent distress      [] Abnormal -     Mental status: [x] Alert and awake  [x] Oriented to person/place/time [x] Able to follow commands    [] Abnormal -     Eyes:   EOM    [x]  Normal    [] Abnormal -   Sclera  [x]  Normal    [] Abnormal -          Discharge [x]  None visible   [] Abnormal -     HENT: [x] Normocephalic, atraumatic  [] Abnormal -   [x] Mouth/Throat: Mucous membranes are moist    External Ears [x] Normal  [] Abnormal -    Neck: [x] No visualized mass [] Abnormal -     Pulmonary/Chest: [x] Respiratory effort normal   [x] No visualized signs of difficulty breathing or respiratory distress        [] Abnormal -      Musculoskeletal:   [x] Normal gait with no signs of ataxia         [x] Normal range of motion of neck        [] Abnormal -     Neurological:        [x] No Facial Asymmetry (Cranial nerve 7 motor function) (limited exam due to video visit)          [x] No gaze palsy        [] Abnormal -          Skin:        [x] No significant exanthematous lesions or discoloration noted on facial skin         [] Abnormal -            Psychiatric:       [x] Normal Affect [] Abnormal -        [x] No Hallucinations    Other pertinent observable physical exam findings:-        On this date 10/20/2021 I have spent 21 minutes reviewing previous notes, test results and face to face (virtual) with the patient discussing the diagnosis and importance of compliance with the treatment plan as well as documenting on the day of the visit. Luciana Aase, was evaluated through a synchronous (real-time) audio-video encounter. The patient (or guardian if applicable) is aware that this is a billable service. Verbal consent to proceed has been obtained within the past 12 months. The visit was conducted pursuant to the emergency declaration under the [address] [place] [address] and the [employer] and [employer] General Act. Patient identification was verified, and a caregiver was present when appropriate.  The patient was located in a state where the provider was credentialed to provide care. An electronic signature was used to authenticate this note.     --ALBERT El - CNP

## 2021-11-15 ENCOUNTER — VIRTUAL VISIT (OUTPATIENT)
Dept: PRIMARY CARE CLINIC | Age: 41
End: 2021-11-15
Payer: COMMERCIAL

## 2021-11-15 DIAGNOSIS — E66.01 CLASS 3 SEVERE OBESITY DUE TO EXCESS CALORIES WITH SERIOUS COMORBIDITY AND BODY MASS INDEX (BMI) OF 50.0 TO 59.9 IN ADULT (HCC): ICD-10-CM

## 2021-11-15 DIAGNOSIS — J01.10 ACUTE NON-RECURRENT FRONTAL SINUSITIS: Primary | ICD-10-CM

## 2021-11-15 DIAGNOSIS — E11.42 TYPE 2 DIABETES MELLITUS WITH DIABETIC POLYNEUROPATHY, WITHOUT LONG-TERM CURRENT USE OF INSULIN (HCC): ICD-10-CM

## 2021-11-15 DIAGNOSIS — M54.12 CERVICAL RADICULOPATHY: ICD-10-CM

## 2021-11-15 PROCEDURE — 99214 OFFICE O/P EST MOD 30 MIN: CPT | Performed by: FAMILY MEDICINE

## 2021-11-15 PROCEDURE — 3051F HG A1C>EQUAL 7.0%<8.0%: CPT | Performed by: FAMILY MEDICINE

## 2021-11-15 RX ORDER — NATEGLINIDE 120 MG/1
120 TABLET ORAL
Qty: 90 TABLET | Refills: 3 | Status: SHIPPED | OUTPATIENT
Start: 2021-11-15 | End: 2022-02-07 | Stop reason: ALTCHOICE

## 2021-11-15 RX ORDER — PREDNISONE 20 MG/1
TABLET ORAL
Qty: 20 TABLET | Refills: 0 | Status: SHIPPED | OUTPATIENT
Start: 2021-11-15 | End: 2022-02-07 | Stop reason: ALTCHOICE

## 2021-11-15 RX ORDER — CEFDINIR 300 MG/1
300 CAPSULE ORAL 2 TIMES DAILY
Qty: 20 CAPSULE | Refills: 0 | Status: SHIPPED | OUTPATIENT
Start: 2021-11-15 | End: 2021-11-25

## 2021-11-15 RX ORDER — GABAPENTIN 400 MG/1
400 CAPSULE ORAL 3 TIMES DAILY
Qty: 90 CAPSULE | Refills: 5 | Status: SHIPPED | OUTPATIENT
Start: 2021-11-15 | End: 2022-08-23

## 2021-11-15 NOTE — PATIENT INSTRUCTIONS
Get your labs checked in [address]. Increase gabapentin to 400 mg three times per day. Start omnicef for possible UTI and recurrent sinusitis.

## 2021-11-20 ASSESSMENT — ENCOUNTER SYMPTOMS
COUGH: 1
RHINORRHEA: 1
SORE THROAT: 1

## 2021-11-20 NOTE — PROGRESS NOTES
Rochelle Coe is a 42 y.o. male who presents today for   Chief Complaint   Patient presents with    Congestion       HPI  Patient is here for concern of cough congestion. Ongoing for 6 days but got better on day 4 and has gotten worse since then. Notes as well doing well this diabetes, no hyperglycemia or hypoglycemia. He wants to lose further weight though because his pain is getting worse from his cervical radiculopathy and lower extremity ankle pain that is chronic and superimposed upon his diabetic neuropathy. Gabapentin continues to help. No change in PMH, family, social, or surgical history unless mentioned above. I have reviewed the above chief complaint and HPI details charted by staff and claim ownership of the documentation. Review of Systems   Constitutional: Positive for fatigue. HENT: Positive for congestion, rhinorrhea, sneezing and sore throat. Respiratory: Positive for cough. Past Medical History:   Diagnosis Date    Degenerative disc disease at L5-S1 level     Hyperlipidemia     Nerve pain     Type 2 diabetes mellitus without complication (HCC)        Current Outpatient Medications   Medication Sig Dispense Refill    predniSONE (DELTASONE) 20 MG tablet Take 3 tabs for 3 days, then 2 tabs for 3 days, then 1 tabs for 3 days, then 1/2 tab for 4 days. 20 tablet 0    gabapentin (NEURONTIN) 400 MG capsule Take 1 capsule by mouth 3 times daily for 30 days.  90 capsule 5    metFORMIN (GLUCOPHAGE) 1000 MG tablet Take 1 tablet by mouth 2 times daily (with meals) 180 tablet 3    nateglinide (STARLIX) 120 MG tablet Take 1 tablet by mouth 3 times daily (before meals) 90 tablet 3    diclofenac (VOLTAREN) 50 MG EC tablet Take 1 tablet by mouth 3 times daily as needed for Pain 90 tablet 3    cefdinir (OMNICEF) 300 MG capsule Take 1 capsule by mouth 2 times daily for 10 days 20 capsule 0    EQ ALLERGY RELIEF, CETIRIZINE, 10 MG tablet Take 1 tablet by mouth nightly 90 tablet 0    sildenafil (VIAGRA) 100 MG tablet Take 1 tablet by mouth as needed for Erectile Dysfunction Generic sildenafil 100 mg or strongest dose 8 tablet 5    Diabetic Shoe MISC by Does not apply route DISPENSE ONE PAIR DIABETIC SHOES AND THREE PAIRS OF HEAT MOLDED INSERTS 1 each 0    aspirin EC 81 MG EC tablet Take 1 tablet by mouth daily 90 tablet 3    fluticasone (FLONASE) 50 MCG/ACT nasal spray 1 spray by Each Nostril route daily 1 Bottle 2     No current facility-administered medications for this visit. No Known Allergies    Past Surgical History:   Procedure Laterality Date    INCISION AND DRAINAGE Right 7/7/2016    KNEE IRRIGATION AND DEBRIDEMENT  performed by [facility]ard MD at [facility] Right 5/31/2016    PERONEAL NERVE RELEASE ; RIGHT FIBULAR HEAD  performed by [facility]heer [facility] at [facility] OR       Social History     Tobacco Use    Smoking status: Current Every Day Smoker     Packs/day: 1.00     Years: 20.00     Pack years: 20.00     Types: Cigarettes    Smokeless tobacco: Never Used   Vaping Use    Vaping Use: Never used   Substance Use Topics    Alcohol use: Yes     Comment: occ    Drug use: No       Family History   Problem Relation Age of Onset    Other Mother         blockage    High Blood Pressure Father     Diabetes Father        There were no vitals taken for this visit. Video visit physical examination shows no acute distress      Assessment:    ICD-10-CM    1. Acute non-recurrent frontal sinusitis  J01.10    2. Cervical radiculopathy  M54.12 gabapentin (NEURONTIN) 400 MG capsule       Plan:   Chronic diseases currently stable, continue with weight loss, acute treatment for infection. No orders of the defined types were placed in this encounter. Orders Placed This Encounter   Medications    predniSONE (DELTASONE) 20 MG tablet     Sig: Take 3 tabs for 3 days, then 2 tabs for 3 days, then 1 tabs for 3 days, then 1/2 tab for 4 days.      Dispense:  20 tablet Refill:  0    gabapentin (NEURONTIN) 400 MG capsule     Sig: Take 1 capsule by mouth 3 times daily for 30 days. Dispense:  90 capsule     Refill:  5    metFORMIN (GLUCOPHAGE) 1000 MG tablet     Sig: Take 1 tablet by mouth 2 times daily (with meals)     Dispense:  180 tablet     Refill:  3    nateglinide (STARLIX) 120 MG tablet     Sig: Take 1 tablet by mouth 3 times daily (before meals)     Dispense:  90 tablet     Refill:  3    diclofenac (VOLTAREN) 50 MG EC tablet     Sig: Take 1 tablet by mouth 3 times daily as needed for Pain     Dispense:  90 tablet     Refill:  3    cefdinir (OMNICEF) 300 MG capsule     Sig: Take 1 capsule by mouth 2 times daily for 10 days     Dispense:  20 capsule     Refill:  0     Medications Discontinued During This Encounter   Medication Reason    nateglinide (STARLIX) 120 MG tablet REORDER    gabapentin (NEURONTIN) 300 MG capsule REORDER    diclofenac (VOLTAREN) 50 MG EC tablet REORDER    metFORMIN (GLUCOPHAGE) 1000 MG tablet REORDER     Patient Instructions   Get your labs checked in [address]. Increase gabapentin to 400 mg three times per day. Start omnicef for possible UTI and recurrent sinusitis. Patient given educational handouts and has had all questions answered. Patient voices understanding and agrees to plans along with risks and benefits of plan. Patient isinstructed to continue prior meds, diet, and exercise plans unless instructed otherwise. Patient agrees to follow up as instructed and sooner if needed. Patient agrees to go to ER if condition becomes emergent. Notesmay be completed with dictation device and spelling errors may occur. Materials may be copied and pasted from a notepad outside of EMR, all of which, I, Dr. Jackson Tilley MD, take sole intellectual ownership of and have approved adding to my note. Return in about 3 months (around 2/15/2022) for ov, annual 2 time slots.

## 2021-12-17 DIAGNOSIS — E11.42 TYPE 2 DIABETES MELLITUS WITH DIABETIC POLYNEUROPATHY, WITHOUT LONG-TERM CURRENT USE OF INSULIN (HCC): ICD-10-CM

## 2021-12-17 LAB
ALBUMIN SERPL-MCNC: 4.4 G/DL (ref 3.5–5.2)
ALP BLD-CCNC: 72 U/L (ref 40–130)
ALT SERPL-CCNC: 70 U/L (ref 5–41)
ANION GAP SERPL CALCULATED.3IONS-SCNC: 12 MMOL/L (ref 7–19)
AST SERPL-CCNC: 68 U/L (ref 5–40)
BILIRUB SERPL-MCNC: 0.3 MG/DL (ref 0.2–1.2)
BUN BLDV-MCNC: 11 MG/DL (ref 6–20)
CALCIUM SERPL-MCNC: 9.8 MG/DL (ref 8.6–10)
CHLORIDE BLD-SCNC: 101 MMOL/L (ref 98–111)
CHOLESTEROL, TOTAL: 216 MG/DL (ref 160–199)
CO2: 28 MMOL/L (ref 22–29)
CREAT SERPL-MCNC: 0.8 MG/DL (ref 0.5–1.2)
CREATININE URINE: 47.9 MG/DL (ref 4.2–622)
GFR AFRICAN AMERICAN: >59
GFR NON-AFRICAN AMERICAN: >60
GLUCOSE BLD-MCNC: 309 MG/DL (ref 74–109)
HBA1C MFR BLD: 10.3 % (ref 4–6)
HDLC SERPL-MCNC: 42 MG/DL (ref 55–121)
LDL CHOLESTEROL CALCULATED: 106 MG/DL
MICROALBUMIN UR-MCNC: <1.2 MG/DL (ref 0–19)
MICROALBUMIN/CREAT UR-RTO: NORMAL MG/G
POTASSIUM SERPL-SCNC: 4.2 MMOL/L (ref 3.5–5)
SODIUM BLD-SCNC: 141 MMOL/L (ref 136–145)
TOTAL PROTEIN: 7.6 G/DL (ref 6.6–8.7)
TRIGL SERPL-MCNC: 341 MG/DL (ref 0–149)

## 2021-12-20 ENCOUNTER — TELEPHONE (OUTPATIENT)
Dept: PRIMARY CARE CLINIC | Age: 41
End: 2021-12-20

## 2021-12-20 DIAGNOSIS — E11.42 TYPE 2 DIABETES MELLITUS WITH DIABETIC POLYNEUROPATHY, WITHOUT LONG-TERM CURRENT USE OF INSULIN (HCC): Primary | ICD-10-CM

## 2021-12-20 RX ORDER — EMPAGLIFLOZIN 25 MG/1
1 TABLET, FILM COATED ORAL
Qty: 90 TABLET | Refills: 1 | Status: SHIPPED | OUTPATIENT
Start: 2021-12-20 | End: 2022-06-20

## 2021-12-20 NOTE — TELEPHONE ENCOUNTER
----- Message from Valeria Sauceda MD sent at 12/17/2021  4:20 PM CST -----  [employer] message sent to patient about results and plan. Can we also please call the patient to let them know? Thank you! Diabetes uncontrolled and we need to adjust medication. The best medication would be a once daily injectable that is a combination medicine with both insulin and a medicine that helps with weight loss. It would actually replace the Starlix. The other option which will likely help but not get you to goal is to add once daily Jardiance or something similar. I would suggest the injectable and in that case we can start him on xultophy 10 U in the morning daily, increase by 2 U every 5 days until at 20 U daily. Stop startlix if that is done. If doing the pill. Jardiance 25 daily with breakfast w/ the other pills still.

## 2021-12-20 NOTE — TELEPHONE ENCOUNTER
----- Message from Roger Hua MD sent at 12/17/2021  4:20 PM CST -----  [employer] [place] message sent to patient about results and plan. Can we also please call the patient to let them know? Thank you! Diabetes uncontrolled and we need to adjust medication. The best medication would be a once daily injectable that is a combination medicine with both insulin and a medicine that helps with weight loss. It would actually replace the Starlix. The other option which will likely help but not get you to goal is to add once daily Jardiance or something similar. I would suggest the injectable and in that case we can start him on xultophy 10 U in the morning daily, increase by 2 U every 5 days until at 20 U daily. Stop startlix if that is done. If doing the pill. Jardiance 25 daily with breakfast w/ the other pills still.

## 2021-12-20 NOTE — TELEPHONE ENCOUNTER
Called and spoke with patient in regards to results. He noted he would like to wait on starting an injectable as he is not sure what is illegal to take while driving.      He noted he would like to start with adding the jardiance and if there is not improvement then he will do the injectable,    I verified pharmacy and medication has been sent

## 2021-12-20 NOTE — TELEPHONE ENCOUNTER
----- Message from Rell Weir MD sent at 12/17/2021  4:20 PM CST -----  [employer] message sent to patient about results and plan. Can we also please call the patient to let them know? Thank you! Diabetes uncontrolled and we need to adjust medication. The best medication would be a once daily injectable that is a combination medicine with both insulin and a medicine that helps with weight loss. It would actually replace the Starlix. The other option which will likely help but not get you to goal is to add once daily Jardiance or something similar. I would suggest the injectable and in that case we can start him on xultophy 10 U in the morning daily, increase by 2 U every 5 days until at 20 U daily. Stop startlix if that is done. If doing the pill. Jardiance 25 daily with breakfast w/ the other pills still.

## 2021-12-26 RX ORDER — SILDENAFIL 100 MG/1
TABLET, FILM COATED ORAL
Qty: 8 TABLET | Refills: 0 | Status: SHIPPED | OUTPATIENT
Start: 2021-12-26 | End: 2022-05-09

## 2022-01-04 ENCOUNTER — TELEPHONE (OUTPATIENT)
Dept: PRIMARY CARE CLINIC | Age: 42
End: 2022-01-04

## 2022-01-04 DIAGNOSIS — E11.42 TYPE 2 DIABETES MELLITUS WITH DIABETIC POLYNEUROPATHY, WITHOUT LONG-TERM CURRENT USE OF INSULIN (HCC): Primary | ICD-10-CM

## 2022-01-04 DIAGNOSIS — E11.649 TYPE 2 DIABETES MELLITUS WITH HYPOGLYCEMIA WITHOUT COMA, WITHOUT LONG-TERM CURRENT USE OF INSULIN (HCC): ICD-10-CM

## 2022-01-04 NOTE — TELEPHONE ENCOUNTER
Followed up with patient in regards to voicemail. He stated his blood sugar has still been running high. Ranging anywhere between 213-350. He is concerned. Patient had labs completed on 12/17/21 results were discussed with patient (see telephone encounter). Patient wanted to hold on the injectable (xultophy) due to fear of shots. So jardiance 25mg was added daily, there has been no improvement. Okay to still send in xultopy and discontinue starlix?

## 2022-01-05 NOTE — TELEPHONE ENCOUNTER
Yes. Let him know that I am also scared of shots. However I have actually tried the newer injectables to see how they work and after the first shot, it becomes very easy to do.

## 2022-01-07 NOTE — TELEPHONE ENCOUNTER
Followed up with patient. He is aware of instructions and will call if medication is not covered. Patient will be stopping jardiance and starlix. He will touch base if he has any additional questions or concerns. [place] Vieyra called to request a refill on his medication.       Last office visit : 11/15/2021   Next office visit : 2/21/2022     Requested Prescriptions     Signed Prescriptions Disp Refills    Insulin Degludec-Liraglutide (XUTOPHY) 100-3.6 UNIT-MG/ML SOPN injection pen 3 pen 2     Sig: Inject 10 U in the morning, then increase by 2 U until 20U total is obtained     Authorizing Provider: Andriy Sanchez     Ordering User: Feliberto Krause MA

## 2022-02-07 ENCOUNTER — OFFICE VISIT (OUTPATIENT)
Dept: PRIMARY CARE CLINIC | Age: 42
End: 2022-02-07
Payer: COMMERCIAL

## 2022-02-07 VITALS
WEIGHT: 315 LBS | BODY MASS INDEX: 41.75 KG/M2 | HEART RATE: 88 BPM | HEIGHT: 73 IN | DIASTOLIC BLOOD PRESSURE: 84 MMHG | OXYGEN SATURATION: 97 % | SYSTOLIC BLOOD PRESSURE: 142 MMHG

## 2022-02-07 DIAGNOSIS — E66.01 CLASS 3 SEVERE OBESITY DUE TO EXCESS CALORIES WITH SERIOUS COMORBIDITY AND BODY MASS INDEX (BMI) OF 50.0 TO 59.9 IN ADULT (HCC): ICD-10-CM

## 2022-02-07 DIAGNOSIS — Z00.00 ANNUAL PHYSICAL EXAM: Primary | ICD-10-CM

## 2022-02-07 DIAGNOSIS — E11.42 TYPE 2 DIABETES MELLITUS WITH DIABETIC POLYNEUROPATHY, WITHOUT LONG-TERM CURRENT USE OF INSULIN (HCC): ICD-10-CM

## 2022-02-07 PROCEDURE — 99213 OFFICE O/P EST LOW 20 MIN: CPT | Performed by: FAMILY MEDICINE

## 2022-02-07 PROCEDURE — 99396 PREV VISIT EST AGE 40-64: CPT | Performed by: FAMILY MEDICINE

## 2022-02-07 RX ORDER — SEMAGLUTIDE 1.34 MG/ML
INJECTION, SOLUTION SUBCUTANEOUS
Qty: 2 PEN | Refills: 2 | Status: SHIPPED | OUTPATIENT
Start: 2022-02-07 | End: 2022-03-16 | Stop reason: DRUGHIGH

## 2022-02-07 ASSESSMENT — PATIENT HEALTH QUESTIONNAIRE - PHQ9
SUM OF ALL RESPONSES TO PHQ QUESTIONS 1-9: 0
SUM OF ALL RESPONSES TO PHQ QUESTIONS 1-9: 0
2. FEELING DOWN, DEPRESSED OR HOPELESS: 0
SUM OF ALL RESPONSES TO PHQ QUESTIONS 1-9: 0
1. LITTLE INTEREST OR PLEASURE IN DOING THINGS: 0
SUM OF ALL RESPONSES TO PHQ QUESTIONS 1-9: 0
SUM OF ALL RESPONSES TO PHQ9 QUESTIONS 1 & 2: 0

## 2022-02-07 NOTE — PROGRESS NOTES
Patient was given a sample of Ozempic during today's office visit and he was instructed on how to use the medication.     Lot: [id number]    Exp: 9/30/2024

## 2022-03-03 ENCOUNTER — OFFICE VISIT (OUTPATIENT)
Dept: PRIMARY CARE CLINIC | Age: 42
End: 2022-03-03
Payer: COMMERCIAL

## 2022-03-03 VITALS
OXYGEN SATURATION: 98 % | HEIGHT: 73 IN | BODY MASS INDEX: 41.75 KG/M2 | TEMPERATURE: 97.2 F | WEIGHT: 315 LBS | HEART RATE: 83 BPM | DIASTOLIC BLOOD PRESSURE: 82 MMHG | SYSTOLIC BLOOD PRESSURE: 138 MMHG

## 2022-03-03 DIAGNOSIS — E11.42 TYPE 2 DIABETES MELLITUS WITH DIABETIC POLYNEUROPATHY, WITHOUT LONG-TERM CURRENT USE OF INSULIN (HCC): ICD-10-CM

## 2022-03-03 DIAGNOSIS — L03.213 PERIORBITAL CELLULITIS OF RIGHT EYE: ICD-10-CM

## 2022-03-03 DIAGNOSIS — H00.012 HORDEOLUM EXTERNUM OF RIGHT LOWER EYELID: Primary | ICD-10-CM

## 2022-03-03 LAB
CHP ED QC CHECK: NORMAL
GLUCOSE BLD-MCNC: 185 MG/DL

## 2022-03-03 PROCEDURE — 99213 OFFICE O/P EST LOW 20 MIN: CPT | Performed by: FAMILY MEDICINE

## 2022-03-03 PROCEDURE — 82962 GLUCOSE BLOOD TEST: CPT | Performed by: FAMILY MEDICINE

## 2022-03-03 RX ORDER — TOBRAMYCIN 3 MG/ML
1 SOLUTION/ DROPS OPHTHALMIC EVERY 4 HOURS
Qty: 5 ML | Refills: 0 | Status: SHIPPED | OUTPATIENT
Start: 2022-03-03 | End: 2022-03-13

## 2022-03-03 RX ORDER — CETIRIZINE HYDROCHLORIDE 10 MG/1
TABLET ORAL
Qty: 90 TABLET | Refills: 0 | Status: SHIPPED | OUTPATIENT
Start: 2022-03-03 | End: 2022-05-31

## 2022-03-03 ASSESSMENT — ENCOUNTER SYMPTOMS
EYE PAIN: 1
EYE DISCHARGE: 1
RESPIRATORY NEGATIVE: 1
EYE REDNESS: 1

## 2022-03-03 NOTE — PROGRESS NOTES
[address] [facility]  [address] [address] [place] [address]  Dept: [phone]  Dept Fax: [phone]  Loc: [phone]      Subjective:     Chief Complaint   Patient presents with    Eye Problem     eyelid swelling, draining, blurry vision since Sunday. HPI:  Melisa Lopez is a 42 y.o. male presents today with acute pain, redness and swelling in the R lower eyelid. He noticed this 4 days ago. He bought some OTC stye medication with minimal relief. He is a . He is also a diabetic. No recent diabetic eye exam done. He denies change in vision but yesterday, he states because of the severe swelling, he could hardly see. Today, he states the swelling is better. ROS:   Review of Systems   Constitutional: Negative. HENT: Negative. Eyes: Positive for pain ( R eye), discharge ( watery) and redness ( R eye). Respiratory: Negative. Cardiovascular: Negative. Neurological: Negative. All other systems reviewed and are negative.       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    Wound dehiscence, surgical    Generalized anxiety disorder    Psychophysiological insomnia    Tobacco use disorder    Hyperlipidemia associated with type 2 diabetes mellitus ([address]    Type 2 diabetes mellitus with diabetic polyneuropathy, without long-term current use of insulin (HCC)    Morbid obesity with BMI of 50.0-59.9, adult (HCC)    Perianal abscess    Abscess, gluteal, left    Sepsis ([address]    Uncontrolled type 2 diabetes mellitus with hyperglycemia (HCC)    Cigarette nicotine dependence with nicotine-induced disorder    Tobacco abuse counseling    Personal history of noncompliance with medical treatment and regimen       PSHx:  Past Surgical History:   Procedure Laterality Date    INCISION AND DRAINAGE Right 7/7/2016    KNEE IRRIGATION AND DEBRIDEMENT  performed by Yecenia Church MD at [address] Right 5/31/2016    PERONEAL NERVE RELEASE ; RIGHT FIBULAR HEAD  [facility]  Family History   Problem Relation Age of Onset    Other Mother         blockage    High Blood Pressure Father     Diabetes Father        SocialHx:  Social History     Tobacco Use    Smoking status: Current Every Day Smoker     Packs/day: 1.00     Years: 20.00     Pack years: 20.00     Types: Cigarettes    Smokeless tobacco: Never Used   Substance Use Topics    Alcohol use: Yes     Comment: occ       Allergies:  No Known Allergies    Medications:  Current Outpatient Medications   Medication Sig Dispense Refill    tobramycin (TOBREX) 0.3 % ophthalmic solution Place 1 drop into the right eye every 4 hours for 10 days 5 mL 0    Semaglutide,0.25 or 0.5MG/DOS, (OZEMPIC, 0.25 OR 0.5 MG/DOSE,) 2 MG/1.5ML SOPN Start 0.25 mg once per week in the morning for 4 weeks, then increase to 0.5 mg once per week 2 pen 2    sildenafil (VIAGRA) 100 MG tablet TAKE 1 TABLET BY MOUTH AS NEEDED FOR  ERECTILE  DYSFUNCTION 8 tablet 0    empagliflozin (JARDIANCE) 25 MG tablet Take 1 tablet by mouth daily (with breakfast) 90 tablet 1    metFORMIN (GLUCOPHAGE) 1000 MG tablet Take 1 tablet by mouth 2 times daily (with meals) 180 tablet 3    diclofenac (VOLTAREN) 50 MG EC tablet Take 1 tablet by mouth 3 times daily as needed for Pain 90 tablet 3    EQ ALLERGY RELIEF, CETIRIZINE, 10 MG tablet Take 1 tablet by mouth nightly 90 tablet 0    Diabetic Shoe MISC by Does not apply route DISPENSE ONE PAIR DIABETIC SHOES AND THREE PAIRS OF HEAT MOLDED INSERTS 1 each 0    aspirin EC 81 MG EC tablet Take 1 tablet by mouth daily 90 tablet 3    fluticasone (FLONASE) 50 MCG/ACT nasal spray 1 spray by Each Nostril route daily 1 Bottle 2    gabapentin (NEURONTIN) 400 MG capsule Take 1 capsule by mouth 3 times daily for 30 days. 90 capsule 5     No current facility-administered medications for this visit. Objective:   PE:  /82   Pulse 83   Temp 97.2 °F (36.2 °C)   Ht 6' 1\" (1.854 m)   Wt (!) 403 lb 12.8 oz (183.2 kg)   SpO2 98%   BMI 53.27 kg/m²   Physical Exam  Vitals and nursing note reviewed. Constitutional:       Appearance: He is obese. Eyes:        Comments: +periorbital swelling and redness   Cardiovascular:      Rate and Rhythm: Regular rhythm. Heart sounds: Normal heart sounds. Pulmonary:      Breath sounds: Normal breath sounds. Neurological:      Mental Status: He is alert. Assessment & Plan   Marcelo Fregoso was seen today for eye problem. Diagnoses and all orders for this visit:    Hordeolum externum of right lower eyelid  -     tobramycin (TOBREX) 0.3 % ophthalmic solution; Place 1 drop into the right eye every 4 hours for 10 days    Periorbital cellulitis of right eye  -     External Referral To Ophthalmology    Type 2 diabetes mellitus with diabetic polyneuropathy, without long-term current use of insulin ([facility])  -     POCT Glucose    Warm compress  Refer to ophthalmologist asap    Return for follow up as needed. All questions were answered. Medications, including possible adverse effects, and instructions were reviewed and  understanding was confirmed. Follow-up recommendations, including when to contact or return to office (ie; if symptoms worsen or fail to improve), were discussed and acknowledged.     Electronically signed by Gabriel Elise MD on 3/3/22 at 11:39 AM CST

## 2022-03-03 NOTE — PATIENT INSTRUCTIONS
We are committed to providing you with the best care possible. In order to help us achieve these goals please remember to bring all medications, herbal products, and over the counter supplements with you to each visit. If your provider has ordered testing for you, please be sure to follow up with our office if you have not received results within 7 days after the testing took place. *If you receive a survey after visiting one of our offices, please take time to share your experience concerning your physician office visit. These surveys are confidential and no health information about you is shared. We are eager to improve for you and we are counting on your feedback to help make that happen. Patient Education        Styes and Chalazia: Care Instructions  Your Care Instructions     Styes and chalazia (say \"prz-EHQ-xvb-uh\") are both conditions that can cause swelling of the eyelid. A stye is an infection in the root of an eyelash. The infection causes a tender red lump on the edge of the eyelid. The infection can spread until the whole eyelid becomes red and inflamed. Styes usually break open, and a tiny amount of pus drains. They usually clear up on their own in about a week, but they sometimes need treatment with antibiotics. A chalazion is a lump or cyst in the eyelid (chalazion is singular; chalazia is plural). It is caused by swelling and inflammation of deep oil glands inside the eyelid. Chalazia are usually not infected. They can take a few months to heal.  If a chalazion becomes more swollen and painful or does not go away, you may need to have it drained by your doctor. Follow-up care is a key part of your treatment and safety. Be sure to make and go to all appointments, and call your doctor if you are having problems. It's also a good idea to know your test results and keep a list of the medicines you take. How can you care for yourself at home? · Do not rub your eyes.  Do not squeeze or try to open a stye or chalazion. · To help a stye or chalazion heal faster:  ? Put a warm, moist compress on your eye for 5 to 10 minutes, 3 to 6 times a day. Heat often brings a stye to a point where it drains on its own. Keep in mind that warm compresses will often increase swelling a little at first.  ? Do not use hot water or heat a wet cloth in a microwave oven. The compress may get too hot and can burn the eyelid. · Always wash your hands before and after you use a compress or touch your eyes. · If the doctor gave you antibiotic drops or ointment, use the medicine exactly as directed. Use the medicine for as long as instructed, even if your eye starts to feel better. · To put in eyedrops or ointment:  ? Tilt your head back, and pull your lower eyelid down with one finger. ? Drop or squirt the medicine inside the lower lid. ? Close your eye for 30 to 60 seconds to let the drops or ointment move around. ? Do not touch the ointment or dropper tip to your eyelashes or any other surface. · Do not wear eye makeup or contact lenses until the stye or chalazion heals. · Do not share towels, pillows, or washcloths while you have a stye. When should you call for help? Call your doctor now or seek immediate medical care if:    · You have pain in your eye.     · You have a change in vision or loss of vision.     · Redness and swelling get much worse. Watch closely for changes in your health, and be sure to contact your doctor if:    · Your stye does not get better in 1 week.     · Your chalazion does not start to get better after several weeks. Where can you learn more? Go to [email or web address] and sign in to your [email or web address] account. Enter M111 in the [employer] box to learn more about \"Styes and Chalazia: Care Instructions. \"     If you do not have an account, please click on the \"Sign Up Now\" link.   Current as of: April 29, 2021               Content Version: 13.1  © 5361-3071 [employer], [employer]orporated. Care instructions adapted under license by [facility] ([facility]). If you have questions about a medical condition or this instruction, always ask your healthcare professional. [address] any warranty or liability for your use of this information.

## 2022-03-04 ASSESSMENT — ENCOUNTER SYMPTOMS
ABDOMINAL PAIN: 0
COLOR CHANGE: 0
RHINORRHEA: 0
COUGH: 0
EYE ITCHING: 0
SORE THROAT: 0
NAUSEA: 0
SHORTNESS OF BREATH: 0

## 2022-03-04 NOTE — PROGRESS NOTES
Roshan Ortiz is a 42 y.o. male who presents today for   Chief Complaint   Patient presents with    Annual Exam    Diabetes     3 month follow up, insurance is not covering xutophy    Fall     pt reports he slipped on the ice and hurt his right knee, lower back       HPI  Patient is here for 2 separate visits: annual wellness visit as well as acute and chronic issues. The below review of systems and physical exam applies to both encounters. Annual HPI:  Patient is here for annual physical exam today. Patient denies any major changes in family medical history or recent screenings. Using Covid precautions and also has had the vaccine. Acute/Chronic HPI:  Patient is here for diabetes. It is uncontrolled. It has been uncontrolled for some time. He is interested in diabetes management changes. He notes that he is not having any particular issues that he can tell. He is not feeling any effects from hypertension either. He still battles obesity. No change in PMH, family, social, or surgical history unless mentioned above. I have reviewed the above chief complaint and HPI details charted by staff and claim ownership of the documentation. Review of Systems   Constitutional: Negative for chills and fever. HENT: Negative for rhinorrhea and sore throat. Eyes: Negative for itching and visual disturbance. Respiratory: Negative for cough and shortness of breath. Cardiovascular: Negative for chest pain and palpitations. Gastrointestinal: Negative for abdominal pain and nausea. Endocrine: Negative for polydipsia and polyuria. Genitourinary: Negative for dysuria and frequency. Musculoskeletal: Positive for arthralgias and gait problem. Negative for myalgias. Skin: Negative for color change and rash. Allergic/Immunologic: Negative for environmental allergies and food allergies. Neurological: Negative for dizziness and light-headedness. Hematological: Negative for adenopathy. Does not bruise/bleed easily. Psychiatric/Behavioral: Negative for dysphoric mood. The patient is not nervous/anxious. Past Medical History:   Diagnosis Date    Degenerative disc disease at L5-S1 level     Hyperlipidemia     Nerve pain     Type 2 diabetes mellitus without complication (HCC)        Current Outpatient Medications   Medication Sig Dispense Refill    Semaglutide,0.25 or 0.5MG/DOS, (OZEMPIC, 0.25 OR 0.5 MG/DOSE,) 2 MG/1.5ML SOPN Start 0.25 mg once per week in the morning for 4 weeks, then increase to 0.5 mg once per week 2 pen 2    sildenafil (VIAGRA) 100 MG tablet TAKE 1 TABLET BY MOUTH AS NEEDED FOR  ERECTILE  DYSFUNCTION 8 tablet 0    empagliflozin (JARDIANCE) 25 MG tablet Take 1 tablet by mouth daily (with breakfast) 90 tablet 1    gabapentin (NEURONTIN) 400 MG capsule Take 1 capsule by mouth 3 times daily for 30 days. 90 capsule 5    metFORMIN (GLUCOPHAGE) 1000 MG tablet Take 1 tablet by mouth 2 times daily (with meals) 180 tablet 3    diclofenac (VOLTAREN) 50 MG EC tablet Take 1 tablet by mouth 3 times daily as needed for Pain 90 tablet 3    Diabetic Shoe MISC by Does not apply route DISPENSE ONE PAIR DIABETIC SHOES AND THREE PAIRS OF HEAT MOLDED INSERTS 1 each 0    aspirin EC 81 MG EC tablet Take 1 tablet by mouth daily 90 tablet 3    fluticasone (FLONASE) 50 MCG/ACT nasal spray 1 spray by Each Nostril route daily 1 Bottle 2    tobramycin (TOBREX) 0.3 % ophthalmic solution Place 1 drop into the right eye every 4 hours for 10 days 5 mL 0    cetirizine (EQ ALLERGY RELIEF, CETIRIZINE,) 10 MG tablet Take 1 tablet by mouth nightly 90 tablet 0     No current facility-administered medications for this visit.        No Known Allergies    Past Surgical History:   Procedure Laterality Date    INCISION AND DRAINAGE Right 7/7/2016    KNEE IRRIGATION AND DEBRIDEMENT  performed by Nico Quinn MD at [address] Right 5/31/2016    PERONEAL NERVE RELEASE ; RIGHT FIBULAR HEAD  performed by [facility]a Doan [facility] at [facility] OR       Social History     Tobacco Use    Smoking status: Current Every Day Smoker     Packs/day: 1.00     Years: 20.00     Pack years: 20.00     Types: Cigarettes    Smokeless tobacco: Never Used   Vaping Use    Vaping Use: Never used   Substance Use Topics    Alcohol use: Yes     Comment: occ    Drug use: No       Family History   Problem Relation Age of Onset    Other Mother         blockage    High Blood Pressure Father     Diabetes Father        BP (!) 142/84 (Site: Left Upper Arm, Position: Sitting)   Pulse 88   Ht 6' 1\" (1.854 m)   Wt (!) 407 lb 3.2 oz (184.7 kg)   SpO2 97%   BMI 53.72 kg/m²     Physical Exam  Vitals and nursing note reviewed. Constitutional:       General: He is not in acute distress. Appearance: He is well-developed. He is not toxic-appearing or diaphoretic. HENT:      Head: Normocephalic and atraumatic. Not macrocephalic and not microcephalic. Right Ear: External ear normal. No decreased hearing noted. No drainage. Left Ear: External ear normal. No decreased hearing noted. No drainage. Nose: Nose normal. No nasal deformity or rhinorrhea. Mouth/Throat:      Mouth: Mucous membranes are not pale and not dry. Pharynx: Uvula midline. Eyes:      General: Lids are normal. No scleral icterus. Right eye: No discharge. Left eye: No discharge. Conjunctiva/sclera: Conjunctivae normal.      Pupils: Pupils are equal, round, and reactive to light. Neck:      Thyroid: No thyromegaly. Trachea: Phonation normal. No tracheal deviation. Cardiovascular:      Rate and Rhythm: Normal rate and regular rhythm. No extrasystoles are present. Heart sounds: Normal heart sounds, S1 normal and S2 normal. No murmur heard. Pulmonary:      Effort: Pulmonary effort is normal. No respiratory distress. Breath sounds: Normal breath sounds. No wheezing, rhonchi or rales.    Chest:   Breasts: Right: No supraclavicular adenopathy. Left: No supraclavicular adenopathy. Abdominal:      General: Bowel sounds are normal. There is no distension. Palpations: Abdomen is soft. Tenderness: There is no abdominal tenderness. There is no guarding. Comments: Central obesity   Musculoskeletal:         General: No tenderness. Normal range of motion. Cervical back: Normal range of motion and neck supple. No tenderness or bony tenderness. Thoracic back: Normal. No tenderness or bony tenderness. Lumbar back: Normal. No tenderness or bony tenderness. Right lower leg: No swelling. No edema. Left lower leg: No swelling. No edema. Lymphadenopathy:      Head:      Right side of head: No submental, submandibular or tonsillar adenopathy. Left side of head: No submental, submandibular or tonsillar adenopathy. Cervical: No cervical adenopathy. Upper Body:      Right upper body: No supraclavicular adenopathy. Left upper body: No supraclavicular adenopathy. Skin:     General: Skin is dry. Coloration: Skin is not pale. Findings: No erythema (on head, neck, face, extremities) or rash (on extremities, head, neck, face). Nails: There is no clubbing. Neurological:      Mental Status: He is alert and oriented to person, place, and time. Motor: No tremor or seizure activity. Gait: Gait normal.      Deep Tendon Reflexes: Reflexes are normal and symmetric. Psychiatric:         Speech: Speech normal.         Behavior: Behavior normal.         Thought Content: Thought content normal.         Judgment: Judgment normal.         Assessment:    ICD-10-CM    1. Annual physical exam  Z00.00    2. Type 2 diabetes mellitus with diabetic polyneuropathy, without long-term current use of insulin ([facility])  E11.42    3.  Class 3 severe obesity due to excess calories with serious comorbidity and body mass index (BMI) of 50.0 to 59.9 in adult ([facility])  E66.01 P83.58        Plan:   Plan #1: Annual exam  I have reviewed and assessed the patient's current health status, risk factors, and progress for available preventative care. Patient received age-related anticipatory guidance in regards to personal and public health as well as USPSTF evidence based guidelines for screening and prevention. This included the need for regular generalized activity and exercise as tolerated and a diet high in plant based fiber with a well balanced diet. Immunizations discussed along with continuing covid precautions. Plan #2: Acute and chronic conditions  Patient to start Ozempic to help with weight loss and replace the Starlix. I do believe that we may consider adding insulin separately in the future if needed. Also the patient to follow-up for uncontrolled diabetes  No orders of the defined types were placed in this encounter. Orders Placed This Encounter   Medications    Semaglutide,0.25 or 0.5MG/DOS, (OZEMPIC, 0.25 OR 0.5 MG/DOSE,) 2 MG/1.5ML SOPN     Sig: Start 0.25 mg once per week in the morning for 4 weeks, then increase to 0.5 mg once per week     Dispense:  2 pen     Refill:  2     Medications Discontinued During This Encounter   Medication Reason    nateglinide (STARLIX) 120 MG tablet Therapy completed    Insulin Degludec-Liraglutide (XUTOPHY) 100-3.6 UNIT-MG/ML SOPN injection pen Therapy completed    predniSONE (DELTASONE) 20 MG tablet Therapy completed     There are no Patient Instructions on file for this visit. Patient given educational handouts and has had all questions answered. Patient voices understanding and agrees to plans along with risks and benefits of plan. Patient isinstructed to continue prior meds, diet, and exercise plans unless instructed otherwise. Patient agrees to follow up as instructed and sooner if needed. Patient agrees to go to ER if condition becomes emergent.       Notesmay be completed with dictation device and spelling errors may occur. Materials may be copied and pasted from a notepad outside of EMR, all of which, I, Dr. Chet Pimentel MD, take sole intellectual ownership of and have approved adding to my note. Return in about 3 months (around 5/7/2022) for ov, POCT A1C.

## 2022-03-15 ENCOUNTER — TELEPHONE (OUTPATIENT)
Dept: PRIMARY CARE CLINIC | Age: 42
End: 2022-03-15

## 2022-03-15 DIAGNOSIS — E11.42 TYPE 2 DIABETES MELLITUS WITH DIABETIC POLYNEUROPATHY, WITHOUT LONG-TERM CURRENT USE OF INSULIN (HCC): Primary | ICD-10-CM

## 2022-03-15 NOTE — TELEPHONE ENCOUNTER
Pt called and stated his blood sugar as of the last 2 weeks is running around 180 before meals and 210 after meals. He is currently on Ozempic 0.5 and wants to know if there needs to be more changes made to his medication.

## 2022-03-16 NOTE — TELEPHONE ENCOUNTER
Zully Oswald called to request a refill on his medication.       Last office visit : 3/3/2022   Next office visit : 5/16/2022     Requested Prescriptions     Signed Prescriptions Disp Refills    Semaglutide, 1 MG/DOSE, 2 MG/1.5ML SOPN 3 mL 3     Sig: Inject 1 mg into the skin once a week     Authorizing Provider: Tyler Morales     Ordering User: Mega Redding MA

## 2022-03-16 NOTE — TELEPHONE ENCOUNTER
Continue to decrease carbohydrate consumption especially from breads, potatoes, and corn. Positive needs to be watched as well. If he has been on the Ozempic 0.5 mg for 4 weeks or more, we can increase to 1 mg daily. I would suggest we do that once he gets the being on this dosage for 4 weeks.   We can send the medication in advance for him to start

## 2022-05-09 RX ORDER — SILDENAFIL 100 MG/1
100 TABLET, FILM COATED ORAL PRN
Qty: 8 TABLET | Refills: 2 | Status: SHIPPED | OUTPATIENT
Start: 2022-05-09

## 2022-05-09 NOTE — TELEPHONE ENCOUNTER
Conchita Lafleur called requesting a refill of the below medication which has been pended for you:     Requested Prescriptions     Pending Prescriptions Disp Refills    sildenafil (VIAGRA) 100 MG tablet [Pharmacy Med Name: Sildenafil Citrate 100 MG Oral Tablet] 8 tablet 2     Sig: Take 1 tablet by mouth as needed for Erectile Dysfunction       Last Appointment Date: 3/3/2022  Next Appointment Date: 5/16/2022    No Known Allergies

## 2022-05-16 ENCOUNTER — OFFICE VISIT (OUTPATIENT)
Dept: PRIMARY CARE CLINIC | Age: 42
End: 2022-05-16
Payer: COMMERCIAL

## 2022-05-16 VITALS
HEART RATE: 87 BPM | TEMPERATURE: 97.7 F | WEIGHT: 315 LBS | BODY MASS INDEX: 40.43 KG/M2 | DIASTOLIC BLOOD PRESSURE: 82 MMHG | HEIGHT: 74 IN | SYSTOLIC BLOOD PRESSURE: 132 MMHG | OXYGEN SATURATION: 97 %

## 2022-05-16 DIAGNOSIS — G89.29 CHRONIC PAIN OF RIGHT ANKLE: ICD-10-CM

## 2022-05-16 DIAGNOSIS — E11.42 TYPE 2 DIABETES MELLITUS WITH DIABETIC POLYNEUROPATHY, WITHOUT LONG-TERM CURRENT USE OF INSULIN (HCC): Primary | ICD-10-CM

## 2022-05-16 DIAGNOSIS — E66.01 CLASS 3 SEVERE OBESITY DUE TO EXCESS CALORIES WITH SERIOUS COMORBIDITY AND BODY MASS INDEX (BMI) OF 50.0 TO 59.9 IN ADULT (HCC): ICD-10-CM

## 2022-05-16 DIAGNOSIS — M25.571 CHRONIC PAIN OF RIGHT ANKLE: ICD-10-CM

## 2022-05-16 LAB — HBA1C MFR BLD: 7.9 %

## 2022-05-16 PROCEDURE — 3051F HG A1C>EQUAL 7.0%<8.0%: CPT | Performed by: FAMILY MEDICINE

## 2022-05-16 PROCEDURE — 83036 HEMOGLOBIN GLYCOSYLATED A1C: CPT | Performed by: FAMILY MEDICINE

## 2022-05-16 PROCEDURE — 99214 OFFICE O/P EST MOD 30 MIN: CPT | Performed by: FAMILY MEDICINE

## 2022-05-16 RX ORDER — SEMAGLUTIDE 2.68 MG/ML
2 INJECTION, SOLUTION SUBCUTANEOUS
Qty: 9 ML | Refills: 3 | Status: SHIPPED | OUTPATIENT
Start: 2022-05-16

## 2022-05-16 SDOH — ECONOMIC STABILITY: FOOD INSECURITY: WITHIN THE PAST 12 MONTHS, YOU WORRIED THAT YOUR FOOD WOULD RUN OUT BEFORE YOU GOT MONEY TO BUY MORE.: NEVER TRUE

## 2022-05-16 SDOH — ECONOMIC STABILITY: FOOD INSECURITY: WITHIN THE PAST 12 MONTHS, THE FOOD YOU BOUGHT JUST DIDN'T LAST AND YOU DIDN'T HAVE MONEY TO GET MORE.: NEVER TRUE

## 2022-05-16 ASSESSMENT — PATIENT HEALTH QUESTIONNAIRE - PHQ9
SUM OF ALL RESPONSES TO PHQ9 QUESTIONS 1 & 2: 0
SUM OF ALL RESPONSES TO PHQ QUESTIONS 1-9: 0
SUM OF ALL RESPONSES TO PHQ QUESTIONS 1-9: 0
2. FEELING DOWN, DEPRESSED OR HOPELESS: 0
SUM OF ALL RESPONSES TO PHQ QUESTIONS 1-9: 0
SUM OF ALL RESPONSES TO PHQ QUESTIONS 1-9: 0
1. LITTLE INTEREST OR PLEASURE IN DOING THINGS: 0

## 2022-05-16 ASSESSMENT — SOCIAL DETERMINANTS OF HEALTH (SDOH): HOW HARD IS IT FOR YOU TO PAY FOR THE VERY BASICS LIKE FOOD, HOUSING, MEDICAL CARE, AND HEATING?: NOT HARD AT ALL

## 2022-05-16 NOTE — PROGRESS NOTES
Cameron Ndiaye is a 42 y.o. male who presents today for   Chief Complaint   Patient presents with    Diabetes     180-200 blood sugars fasting 239/245 when eating       HPI  Patient is here for diabetes follow up, a1c taken. Pt reports that blood sugars at home fasting range 180/200 and after eating 239-245. Patient has been continuing to try to work on weight loss. No change in PMH, family, social, or surgical history unless mentioned above. I have reviewed the above chief complaint and HPI details charted by staff and claim ownership of the documentation. Review of Systems    Past Medical History:   Diagnosis Date    Degenerative disc disease at L5-S1 level     Hyperlipidemia     Nerve pain     Type 2 diabetes mellitus without complication (HCC)        Current Outpatient Medications   Medication Sig Dispense Refill    Semaglutide, 2 MG/DOSE, (OZEMPIC, 2 MG/DOSE,) 8 MG/3ML SOPN Inject 2 mg into the skin every 7 days 9 mL 3    sildenafil (VIAGRA) 100 MG tablet Take 1 tablet by mouth as needed for Erectile Dysfunction 8 tablet 2    cetirizine (EQ ALLERGY RELIEF, CETIRIZINE,) 10 MG tablet Take 1 tablet by mouth nightly 90 tablet 0    empagliflozin (JARDIANCE) 25 MG tablet Take 1 tablet by mouth daily (with breakfast) 90 tablet 1    gabapentin (NEURONTIN) 400 MG capsule Take 1 capsule by mouth 3 times daily for 30 days. 90 capsule 5    metFORMIN (GLUCOPHAGE) 1000 MG tablet Take 1 tablet by mouth 2 times daily (with meals) 180 tablet 3    diclofenac (VOLTAREN) 50 MG EC tablet Take 1 tablet by mouth 3 times daily as needed for Pain 90 tablet 3    Diabetic Shoe MISC by Does not apply route DISPENSE ONE PAIR DIABETIC SHOES AND THREE PAIRS OF HEAT MOLDED INSERTS 1 each 0    aspirin EC 81 MG EC tablet Take 1 tablet by mouth daily 90 tablet 3    fluticasone (FLONASE) 50 MCG/ACT nasal spray 1 spray by Each Nostril route daily 1 Bottle 2     No current facility-administered medications for this visit. No Known Allergies    Past Surgical History:   Procedure Laterality Date    INCISION AND DRAINAGE Right 7/7/2016    KNEE IRRIGATION AND DEBRIDEMENT  performed by Kvng Daly MD at [address] Right 5/31/2016    PERONEAL NERVE RELEASE ; RIGHT FIBULAR HEAD  performed by Kvng Daly MD at [facility] OR       Social History     Tobacco Use    Smoking status: Current Every Day Smoker     Packs/day: 1.00     Years: 20.00     Pack years: 20.00     Types: Cigarettes    Smokeless tobacco: Never Used   Vaping Use    Vaping Use: Never used   Substance Use Topics    Alcohol use: Yes     Comment: occ    Drug use: No       Family History   Problem Relation Age of Onset    Other Mother         blockage    High Blood Pressure Father     Diabetes Father        /82 (Site: Left Upper Arm)   Pulse 87   Temp 97.7 °F (36.5 °C)   Ht 6' 2\" (1.88 m)   Wt (!) 399 lb (181 kg)   SpO2 97%   BMI 51.23 kg/m²     Physical Exam  Abdominal:      Comments: Central obesity   Musculoskeletal:         General: Swelling (R ankle swlling chronic) present. Neurological:      Gait: Gait abnormal.     Review of Systems  Constitutional: Negative for chills and fever  Respiratory: Negative for cough, chest tightness, shortness of breath, and wheezing. Cardiovascular: Negative for chest pain, palpitations, and leg swelling  Gastrointestinal: Negative for abdominal pain, constipation, diarrhea, nausea, and vomiting  Genitourinary: Negative for difficulty urinating, dysuria, and frequency    Physical Exam:   Vital signs and nursing notes reviewed. Constitutional:   General: No acute distress   Appearance: Well-developed, not diaphoretic  HEENT:   Head: Normocephalic and atraumatic  Eyes:   General: No scleral icterus  Cardiovascular:   Rate and rhythm: Normal rate and regular rhythm   Heart sounds: Normal heart sounds, no murmur  Respiratory:    Effort: Pulmonary effort is normal, no accessory muscle  usage.   No acute respiratory distress   Breath sounds: Normal breath sounds present. No wheezing  or rales. Bilateral aeration  Abdominal:   General: Bowel sounds are normal, there is no distention. Palpation: Abdomen is soft   Tenderness: No abdominal tenderness   Hernia: No hernias present  Musculoskeletal:   No edema in lower extremities bilaterally  Skin:   Skin is warm and dry. No pallor noted. Neurological:   Alert and oriented to person, place, and time. No major dysrarthria noted  Psychological:   Mood and affect are congruent. Negative for depression or  anxiety. Thought content normal. Speech is normal        Assessment:    ICD-10-CM    1. Type 2 diabetes mellitus with diabetic polyneuropathy, without long-term current use of insulin ([facility])  E11.42 POCT glycosylated hemoglobin (Hb A1C)   2. Class 3 severe obesity due to excess calories with serious comorbidity and body mass index (BMI) of 50.0 to 59.9 in adult ([facility]  E66.01     Z68.43    3. Chronic pain of right ankle  M25.571     G89.29        Plan:   Increased walking while . Patient is also to start Ozempic to help with weight as well as diabetes which is improving. Orders Placed This Encounter   Procedures    POCT glycosylated hemoglobin (Hb A1C)     Orders Placed This Encounter   Medications    Semaglutide, 2 MG/DOSE, (OZEMPIC, 2 MG/DOSE,) 8 MG/3ML SOPN     Sig: Inject 2 mg into the skin every 7 days     Dispense:  9 mL     Refill:  3     Medications Discontinued During This Encounter   Medication Reason    Semaglutide, 1 MG/DOSE, 2 MG/1.5ML SOPN LIST CLEANUP     Patient Instructions   Increase the ozempic to 2 mg once weekly. Continue the other meds. Patient given educational handouts and has had all questions answered. Patient voices understanding and agrees to plans along with risks and benefits of plan. Patient isinstructed to continue prior meds, diet, and exercise plans unless instructed otherwise.  Patient agrees to follow up as instructed and sooner if needed. Patient agrees to go to ER if condition becomes emergent. Notesmay be completed with dictation device and spelling errors may occur. Materials may be copied and pasted from a notepad outside of EMR, all of which, I, Dr. Jaja Rogel MD, take sole intellectual ownership of and have approved adding to my note. Return in about 3 months (around 8/16/2022) for ov, POCT A1C.

## 2022-05-31 RX ORDER — CETIRIZINE HYDROCHLORIDE 10 MG/1
TABLET, FILM COATED ORAL
Qty: 90 TABLET | Refills: 0 | Status: SHIPPED | OUTPATIENT
Start: 2022-05-31 | End: 2022-08-23

## 2022-06-08 ENCOUNTER — TELEPHONE (OUTPATIENT)
Dept: PRIMARY CARE CLINIC | Age: 42
End: 2022-06-08

## 2022-06-08 NOTE — TELEPHONE ENCOUNTER
----- Message from Mini Vegas sent at 6/8/2022  9:07 AM CDT -----  Subject: Message to Provider    QUESTIONS  Information for Provider? Pt reports  urine test results   are showing sugar in urine. Given 45 days to fix and pt wants appt as soon   as possible. Wants to know if it's from medication or something else.   ---------------------------------------------------------------------------  --------------  CALL BACK INFO  What is the best way for the office to contact you? OK to leave message on   voicemail  Preferred Call Back Phone Number? [phone]  ---------------------------------------------------------------------------  --------------  SCRIPT ANSWERS  Relationship to Patient? Self  Have you recently (14 days) seen a provider for this problem?  Yes

## 2022-06-13 ENCOUNTER — OFFICE VISIT (OUTPATIENT)
Dept: PRIMARY CARE CLINIC | Age: 42
End: 2022-06-13
Payer: COMMERCIAL

## 2022-06-13 VITALS
HEIGHT: 74 IN | TEMPERATURE: 96.4 F | HEART RATE: 77 BPM | OXYGEN SATURATION: 97 % | WEIGHT: 315 LBS | DIASTOLIC BLOOD PRESSURE: 80 MMHG | SYSTOLIC BLOOD PRESSURE: 130 MMHG | BODY MASS INDEX: 40.43 KG/M2

## 2022-06-13 DIAGNOSIS — M25.512 ACUTE PAIN OF LEFT SHOULDER: ICD-10-CM

## 2022-06-13 DIAGNOSIS — E11.42 TYPE 2 DIABETES MELLITUS WITH DIABETIC POLYNEUROPATHY, WITHOUT LONG-TERM CURRENT USE OF INSULIN (HCC): Primary | ICD-10-CM

## 2022-06-13 LAB
BILIRUBIN, POC: NORMAL
BLOOD URINE, POC: NORMAL
CHP ED QC CHECK: NORMAL
CLARITY, POC: NORMAL
COLOR, POC: YELLOW
GLUCOSE BLD-MCNC: 183 MG/DL
GLUCOSE URINE, POC: 500
KETONES, POC: NORMAL
LEUKOCYTE EST, POC: NORMAL
NITRITE, POC: NORMAL
PH, POC: 5
PROTEIN, POC: NORMAL
SPECIFIC GRAVITY, POC: 1.02
UROBILINOGEN, POC: 0.2

## 2022-06-13 PROCEDURE — 99213 OFFICE O/P EST LOW 20 MIN: CPT | Performed by: NURSE PRACTITIONER

## 2022-06-13 PROCEDURE — 3051F HG A1C>EQUAL 7.0%<8.0%: CPT | Performed by: NURSE PRACTITIONER

## 2022-06-13 PROCEDURE — 82962 GLUCOSE BLOOD TEST: CPT | Performed by: NURSE PRACTITIONER

## 2022-06-13 PROCEDURE — 81002 URINALYSIS NONAUTO W/O SCOPE: CPT | Performed by: NURSE PRACTITIONER

## 2022-06-13 NOTE — PROGRESS NOTES
[address], [facility]     Phone:  [phone]  Fax:  [phone]      Kd Heaton is a 42 y.o. male who presents today for his medical conditions/complaints as noted below. Kd Heaton is c/o of Blood Sugar Problem and Shoulder Pain (left)      Chief Complaint   Patient presents with    Blood Sugar Problem    Shoulder Pain     left       HPI:     HPI     Patient presents for concerns related to elevated sugar that patient had in his urine at work, he is a . Patient reports just had increase in dosing on Ozempic to 2 mg weekly. Glucose has been averaging 135-205. Reports he drinks water often. Patient also reports injured left shoulder yesterday when picking 2x4's and it \"popped.     Past Medical History:   Diagnosis Date    Degenerative disc disease at L5-S1 level     Hyperlipidemia     Nerve pain     Type 2 diabetes mellitus without complication [facility])         Past Surgical History:   Procedure Laterality Date    INCISION AND DRAINAGE Right 2016    KNEE IRRIGATION AND DEBRIDEMENT  performed by Demetrice Power MD at [address] [place] [address] Way Right 2016    PERONEAL NERVE RELEASE ; RIGHT FIBULAR HEAD  performed by Demetrice Power MD at [facility] OR       Social History     Tobacco Use    Smoking status: Former Smoker     Packs/day: 1.00     Years: 20.00     Pack years: 20.00     Types: Cigarettes     Quit date: 2020     Years since quittin.8    Smokeless tobacco: Never Used   Substance Use Topics    Alcohol use: Yes     Comment: occ        Current Outpatient Medications   Medication Sig Dispense Refill    EQ ALLERGY RELIEF, CETIRIZINE, 10 MG tablet Take 1 tablet by mouth nightly 90 tablet 0    Semaglutide, 2 MG/DOSE, (OZEMPIC, 2 MG/DOSE,) 8 MG/3ML SOPN Inject 2 mg into the skin every 7 days 9 mL 3    sildenafil (VIAGRA) 100 MG tablet Take 1 tablet by mouth as needed for Erectile Dysfunction 8 tablet 2    metFORMIN (GLUCOPHAGE) 1000 MG tablet Take 1 tablet by mouth 2 times daily (with meals) 180 tablet 3    diclofenac (VOLTAREN) 50 MG EC tablet Take 1 tablet by mouth 3 times daily as needed for Pain 90 tablet 3    Diabetic Shoe MISC by Does not apply route DISPENSE ONE PAIR DIABETIC SHOES AND THREE PAIRS OF HEAT MOLDED INSERTS 1 each 0    aspirin EC 81 MG EC tablet Take 1 tablet by mouth daily 90 tablet 3    fluticasone (FLONASE) 50 MCG/ACT nasal spray 1 spray by Each Nostril route daily 1 Bottle 2    dapagliflozin (FARXIGA) 10 MG tablet Take 1 tablet by mouth every morning 90 tablet 0    gabapentin (NEURONTIN) 400 MG capsule Take 1 capsule by mouth 3 times daily for 30 days. 90 capsule 5     No current facility-administered medications for this visit. No Known Allergies    Family History   Problem Relation Age of Onset    Other Mother         blockage    High Blood Pressure Father     Diabetes Father                Subjective:      Review of Systems   Constitutional: Negative for activity change and fever. HENT: Negative for congestion, ear pain and sore throat. Respiratory: Negative for cough, chest tightness and shortness of breath. Cardiovascular: Negative for chest pain. Gastrointestinal: Negative for abdominal pain, diarrhea, nausea and vomiting. Genitourinary: Negative for frequency and urgency. Musculoskeletal: Positive for myalgias. Negative for arthralgias. Skin: Negative for color change. Neurological: Negative for dizziness, weakness and numbness. Psychiatric/Behavioral: Negative for agitation. The patient is not nervous/anxious. Objective:     Physical Exam  Vitals reviewed. Constitutional:       Appearance: Normal appearance. HENT:      Head: Normocephalic. Right Ear: Tympanic membrane normal.      Left Ear: Tympanic membrane normal.      Nose: Nose normal.      Mouth/Throat:      Mouth: Mucous membranes are moist.      Pharynx: Oropharynx is clear.    Eyes:      Extraocular Movements: Extraocular movements intact. Pupils: Pupils are equal, round, and reactive to light. Cardiovascular:      Rate and Rhythm: Normal rate and regular rhythm. Pulses: Normal pulses. Heart sounds: Normal heart sounds. Pulmonary:      Effort: Pulmonary effort is normal.      Breath sounds: Normal breath sounds. Abdominal:      General: Bowel sounds are normal.      Palpations: Abdomen is soft. Musculoskeletal:         General: Normal range of motion. Left shoulder: Tenderness present. Cervical back: Normal range of motion. Skin:     General: Skin is warm and dry. Neurological:      Mental Status: He is alert and oriented to person, place, and time. Psychiatric:         Mood and Affect: Mood normal.         Behavior: Behavior normal.         /80   Pulse 77   Temp (!) 96.4 °F (35.8 °C) (Temporal)   Ht 6' 2\" (1.88 m)   Wt (!) 390 lb (176.9 kg)   SpO2 97%   BMI 50.07 kg/m²     Assessment:      Diagnosis Orders   1. Type 2 diabetes mellitus with diabetic polyneuropathy, without long-term current use of insulin ([facility])  POCT Glucose    POCT Urinalysis no Micro   2. Acute pain of left shoulder         Results for orders placed or performed in visit on 06/13/22   POCT Glucose   Result Value Ref Range    Glucose 183 mg/dL    QC OK? POCT Urinalysis no Micro   Result Value Ref Range    Color, UA yellow     Clarity, UA slightly cloudy     Glucose, UA      Bilirubin, UA neg     Ketones, UA neg     Spec Grav, UA 1.020     Blood, UA POC neg     pH, UA 5.0     Protein, UA POC neg     Urobilinogen, UA 0.2     Leukocytes, UA neg     Nitrite, UA neg        Plan:     1. Type 2 diabetes mellitus with diabetic polyneuropathy, without long-term current use of insulin ([facility])  Sure to drink plenty of water, and eat a diabetic diet. - POCT Glucose  - POCT Urinalysis no Micro    2. Acute pain of left shoulder  Do range of motion exercises and take ibuprofen as needed. Return in about 1 week (around 6/20/2022) for 1 week f/u needs ua. Orders Placed This Encounter   Procedures    POCT Glucose    POCT Urinalysis no Micro       No orders of the defined types were placed in this encounter. Patient offered educational handouts and has had all questions answered. Patient voices understanding and agrees to plans along with risks and benefits of plan. Patient is instructed to continue prior meds, diet, and exercise plans as instructed. Patient agrees to follow up as instructed and sooner if needed. Patient agrees to go to ER if condition becomes emergent. EMR Dragon/transcription disclaimer: Some of this encounter note is an electronic transcription/translation of spoken language to printed text. The electronic translation of spoken language may permit erroneous, or at times, nonsensical words or phrases to be inadvertently transcribed.  Although I have reviewed the note for such errors, some may still exist.    Electronically signed by ALBERT Ellignton CNP on 6/30/2022 at 5:39 AM

## 2022-06-20 ENCOUNTER — OFFICE VISIT (OUTPATIENT)
Dept: PRIMARY CARE CLINIC | Age: 42
End: 2022-06-20
Payer: COMMERCIAL

## 2022-06-20 VITALS
SYSTOLIC BLOOD PRESSURE: 132 MMHG | RESPIRATION RATE: 20 BRPM | TEMPERATURE: 97.3 F | WEIGHT: 315 LBS | OXYGEN SATURATION: 98 % | BODY MASS INDEX: 41.75 KG/M2 | HEART RATE: 97 BPM | HEIGHT: 73 IN | DIASTOLIC BLOOD PRESSURE: 82 MMHG

## 2022-06-20 DIAGNOSIS — E11.42 TYPE 2 DIABETES MELLITUS WITH DIABETIC POLYNEUROPATHY, WITHOUT LONG-TERM CURRENT USE OF INSULIN (HCC): Primary | ICD-10-CM

## 2022-06-20 DIAGNOSIS — F17.200 TOBACCO USE DISORDER: ICD-10-CM

## 2022-06-20 DIAGNOSIS — E11.42 TYPE 2 DIABETES MELLITUS WITH DIABETIC POLYNEUROPATHY, WITHOUT LONG-TERM CURRENT USE OF INSULIN (HCC): ICD-10-CM

## 2022-06-20 DIAGNOSIS — E66.01 MORBID OBESITY WITH BMI OF 50.0-59.9, ADULT (HCC): ICD-10-CM

## 2022-06-20 LAB
APPEARANCE FLUID: CLEAR
BILIRUBIN, POC: ABNORMAL
BLOOD URINE, POC: ABNORMAL
CLARITY, POC: YELLOW
COLOR, POC: CLEAR
GLUCOSE URINE, POC: 500
KETONES, POC: ABNORMAL
LEUKOCYTE EST, POC: ABNORMAL
NITRITE, POC: ABNORMAL
PH, POC: 5.5
PROTEIN, POC: ABNORMAL
SPECIFIC GRAVITY, POC: 1.02
UROBILINOGEN, POC: 0.2

## 2022-06-20 PROCEDURE — 81002 URINALYSIS NONAUTO W/O SCOPE: CPT | Performed by: NURSE PRACTITIONER

## 2022-06-20 PROCEDURE — 99214 OFFICE O/P EST MOD 30 MIN: CPT | Performed by: NURSE PRACTITIONER

## 2022-06-20 PROCEDURE — 3051F HG A1C>EQUAL 7.0%<8.0%: CPT | Performed by: NURSE PRACTITIONER

## 2022-06-20 RX ORDER — EMPAGLIFLOZIN 25 MG/1
TABLET, FILM COATED ORAL
Qty: 90 TABLET | Refills: 0 | Status: SHIPPED | OUTPATIENT
Start: 2022-06-20 | End: 2022-06-20

## 2022-06-20 NOTE — PROGRESS NOTES
[address] [address]     Phone:  [phone]  Fax:  [phone]      Federico Mcghee is a 42 y.o. male who presents today for his medical conditions/complaints as noted below. Federico Mcghee is c/o of Diabetes      Chief Complaint   Patient presents with    Diabetes       HPI:     HPI     Reports his urine has improved, he is eating better and lowest blood sugar as 115. Reports he has been feeling better.      Past Medical History:   Diagnosis Date    Degenerative disc disease at L5-S1 level     Hyperlipidemia     Nerve pain     Type 2 diabetes mellitus without complication (HCC)         Past Surgical History:   Procedure Laterality Date    INCISION AND DRAINAGE Right 2016    KNEE IRRIGATION AND DEBRIDEMENT  performed by Calin Becker MD at [address] [place] [address] Right 2016    PERONEAL NERVE RELEASE ; RIGHT FIBULAR HEAD  performed by Calin Becker MD at [facility] OR       Social History     Tobacco Use    Smoking status: Former Smoker     Packs/day: 1.00     Years: 20.00     Pack years: 20.00     Types: Cigarettes     Quit date: 2020     Years since quittin.8    Smokeless tobacco: Never Used   Substance Use Topics    Alcohol use: Yes     Comment: occ        Current Outpatient Medications   Medication Sig Dispense Refill    dapagliflozin (FARXIGA) 10 MG tablet Take 1 tablet by mouth every morning 90 tablet 0    EQ ALLERGY RELIEF, CETIRIZINE, 10 MG tablet Take 1 tablet by mouth nightly 90 tablet 0    Semaglutide, 2 MG/DOSE, (OZEMPIC, 2 MG/DOSE,) 8 MG/3ML SOPN Inject 2 mg into the skin every 7 days 9 mL 3    sildenafil (VIAGRA) 100 MG tablet Take 1 tablet by mouth as needed for Erectile Dysfunction 8 tablet 2    metFORMIN (GLUCOPHAGE) 1000 MG tablet Take 1 tablet by mouth 2 times daily (with meals) 180 tablet 3    diclofenac (VOLTAREN) 50 MG EC tablet Take 1 tablet by mouth 3 times daily as needed for Pain 90 tablet 3    Diabetic Shoe MISC by Does not apply route DISPENSE ONE PAIR DIABETIC SHOES AND THREE PAIRS OF HEAT MOLDED INSERTS 1 each 0    aspirin EC 81 MG EC tablet Take 1 tablet by mouth daily 90 tablet 3    fluticasone (FLONASE) 50 MCG/ACT nasal spray 1 spray by Each Nostril route daily 1 Bottle 2    gabapentin (NEURONTIN) 400 MG capsule Take 1 capsule by mouth 3 times daily for 30 days. 90 capsule 5     No current facility-administered medications for this visit. No Known Allergies    Family History   Problem Relation Age of Onset    Other Mother         blockage    High Blood Pressure Father     Diabetes Father                Subjective:      Review of Systems   Constitutional: Negative for activity change and fever. HENT: Negative for congestion, ear pain and sore throat. Respiratory: Negative for cough, chest tightness and shortness of breath. Cardiovascular: Negative for chest pain. Gastrointestinal: Negative for abdominal pain, diarrhea, nausea and vomiting. Genitourinary: Negative for frequency and urgency. Musculoskeletal: Negative for arthralgias and myalgias. Skin: Negative for color change. Neurological: Negative for dizziness, weakness and numbness. Psychiatric/Behavioral: Negative for agitation. The patient is not nervous/anxious. Objective:     Physical Exam  Vitals reviewed. Constitutional:       Appearance: Normal appearance. HENT:      Head: Normocephalic. Right Ear: Tympanic membrane normal.      Left Ear: Tympanic membrane normal.      Nose: Nose normal.      Mouth/Throat:      Mouth: Mucous membranes are moist.      Pharynx: Oropharynx is clear. Eyes:      Extraocular Movements: Extraocular movements intact. Pupils: Pupils are equal, round, and reactive to light. Cardiovascular:      Rate and Rhythm: Normal rate and regular rhythm. Pulses: Normal pulses. Heart sounds: Normal heart sounds.    Pulmonary:      Effort: Pulmonary effort is normal.      Breath sounds: Normal breath sounds. Abdominal:      General: Bowel sounds are normal.      Palpations: Abdomen is soft. Musculoskeletal:         General: Normal range of motion. Cervical back: Normal range of motion. Skin:     General: Skin is warm and dry. Neurological:      Mental Status: He is alert and oriented to person, place, and time. Psychiatric:         Mood and Affect: Mood normal.         Behavior: Behavior normal.         Thought Content: Thought content normal.         Judgment: Judgment normal.         /82   Pulse 97   Temp 97.3 °F (36.3 °C) (Temporal)   Resp 20   Ht 6' 1\" (1.854 m)   Wt (!) 387 lb (175.5 kg)   SpO2 98%   BMI 51.06 kg/m²     Assessment:      Diagnosis Orders   1. Type 2 diabetes mellitus with diabetic polyneuropathy, without long-term current use of insulin ([facility])  POCT Urinalysis no Micro    dapagliflozin (FARXIGA) 10 MG tablet   2. Tobacco use disorder     3. Morbid obesity with BMI of 50.0-59.9, adult ([facility]         Results for orders placed or performed in visit on 06/20/22   POCT Urinalysis no Micro   Result Value Ref Range    Color, UA clear     Clarity, UA yellow     Glucose, UA      Bilirubin, UA neg     Ketones, UA neg     Spec Grav, UA 1.020     Blood, UA POC neg     pH, UA 5.5     Protein, UA POC neg     Urobilinogen, UA 0.2     Leukocytes, UA neg     Nitrite, UA neg     Appearance, Fluid Clear Clear, Slightly Cloudy       Plan:     1. Type 2 diabetes mellitus with diabetic polyneuropathy, without long-term current use of insulin ([facility])    - POCT Urinalysis no Micro  - dapagliflozin (FARXIGA) 10 MG tablet; Take 1 tablet by mouth every morning  Dispense: 90 tablet; Refill: 0    2. Tobacco use disorder  Consider quitting smoking. 3. Morbid obesity with BMI of 50.0-59.9, adult ([address]  Recommend lifestyle changes to include healthier diet and exercise. Return in about 9 weeks (around 8/22/2022).     Orders Placed This Encounter   Procedures    POCT Urinalysis no Micro       Orders Placed This Encounter   Medications    dapagliflozin (FARXIGA) 10 MG tablet     Sig: Take 1 tablet by mouth every morning     Dispense:  90 tablet     Refill:  0            Patient offered educational handouts and has had all questions answered. Patient voices understanding and agrees to plans along with risks and benefits of plan. Patient is instructed to continue prior meds, diet, and exercise plans as instructed. Patient agrees to follow up as instructed and sooner if needed. Patient agrees to go to ER if condition becomes emergent. EMR Dragon/transcription disclaimer: Some of this encounter note is an electronic transcription/translation of spoken language to printed text. The electronic translation of spoken language may permit erroneous, or at times, nonsensical words or phrases to be inadvertently transcribed.  Although I have reviewed the note for such errors, some may still exist.    Electronically signed by ALBERT Coker CNP on 6/30/2022 at 9:04 PM

## 2022-06-21 ENCOUNTER — TELEPHONE (OUTPATIENT)
Dept: PRIMARY CARE CLINIC | Age: 42
End: 2022-06-21

## 2022-06-21 NOTE — TELEPHONE ENCOUNTER
Patient reported he needs a letter from us stating that a medication that he is taking (Jardiance) make sugar in his urine. He said he looked this up on computer and a Co-worker  does the same thing. He said we should have known that. I told him that if it was a side effect it doesn't mean all people has it.

## 2022-06-21 NOTE — TELEPHONE ENCOUNTER
Please send patient a letter stating he is taking jardiance, a medication for diabetes. It is possible for him to excrete sugar in his urine due to hte mechanism of action of this medication. We do know this, but this does not mean his sugar is not too high and he is excreting more sugar in his urine. I would continue to drink plenty of water and eat a diabetic diet.

## 2022-06-30 ASSESSMENT — ENCOUNTER SYMPTOMS
CHEST TIGHTNESS: 0
SORE THROAT: 0
CHEST TIGHTNESS: 0
VOMITING: 0
SHORTNESS OF BREATH: 0
ABDOMINAL PAIN: 0
NAUSEA: 0
DIARRHEA: 0
COLOR CHANGE: 0
NAUSEA: 0
SHORTNESS OF BREATH: 0
DIARRHEA: 0
COUGH: 0
ABDOMINAL PAIN: 0
COUGH: 0
COLOR CHANGE: 0
SORE THROAT: 0
VOMITING: 0

## 2022-08-23 DIAGNOSIS — E11.42 TYPE 2 DIABETES MELLITUS WITH DIABETIC POLYNEUROPATHY, WITHOUT LONG-TERM CURRENT USE OF INSULIN (HCC): ICD-10-CM

## 2022-08-23 DIAGNOSIS — M54.12 CERVICAL RADICULOPATHY: ICD-10-CM

## 2022-08-23 RX ORDER — GABAPENTIN 400 MG/1
CAPSULE ORAL
Qty: 90 CAPSULE | Refills: 0 | Status: SHIPPED | OUTPATIENT
Start: 2022-08-23 | End: 2022-10-31

## 2022-08-23 RX ORDER — CETIRIZINE HYDROCHLORIDE 10 MG/1
TABLET, FILM COATED ORAL
Qty: 90 TABLET | Refills: 0 | Status: SHIPPED | OUTPATIENT
Start: 2022-08-23 | End: 2022-10-31

## 2022-08-23 NOTE — TELEPHONE ENCOUNTER
Conor Buenomargy called to request a refill on his medication. Last office visit : 6/20/2022   Next office visit : 9/26/2022     Last UDS: No results found for: [place] Ol[place], LABBENZ, BUPRENUR, COCAIMETSCRU, GABAPENTIN, MDMA, METAMPU, [address], OXTCOSU, [place], PROPOXYPHENE, THCSCREENUR, TRICYUR    Last Lyla Friday: 8-2021-new one pending-manual process  Medication Contract: 2017   Last Fill: 11- with 5 refills    Requested Prescriptions     Pending Prescriptions Disp Refills    gabapentin (NEURONTIN) 400 MG capsule [Pharmacy Med Name: Gabapentin 400 MG Oral Capsule] 90 capsule 0     Sig: TAKE 1 CAPSULE BY MOUTH THREE TIMES DAILY    EQ ALLERGY RELIEF, CETIRIZINE, 10 MG tablet [Pharmacy Med Name: EQ Allergy Relief (Cetirizine) 10 MG Oral Tablet] 90 tablet 0     Sig: Take 1 tablet by mouth nightly               Please approve or refuse this medication.    Alex Morales LPN

## 2022-09-26 ENCOUNTER — OFFICE VISIT (OUTPATIENT)
Dept: PRIMARY CARE CLINIC | Age: 42
End: 2022-09-26
Payer: COMMERCIAL

## 2022-09-26 VITALS
HEART RATE: 94 BPM | WEIGHT: 315 LBS | OXYGEN SATURATION: 97 % | BODY MASS INDEX: 40.43 KG/M2 | SYSTOLIC BLOOD PRESSURE: 132 MMHG | DIASTOLIC BLOOD PRESSURE: 94 MMHG | HEIGHT: 74 IN | TEMPERATURE: 97.5 F

## 2022-09-26 DIAGNOSIS — E11.42 TYPE 2 DIABETES MELLITUS WITH DIABETIC POLYNEUROPATHY, WITHOUT LONG-TERM CURRENT USE OF INSULIN (HCC): Primary | ICD-10-CM

## 2022-09-26 DIAGNOSIS — G57.31 NEUROPATHY OF PERONEAL NERVE AT RIGHT KNEE: ICD-10-CM

## 2022-09-26 PROBLEM — A41.9 SEPSIS (HCC): Status: RESOLVED | Noted: 2020-08-31 | Resolved: 2022-09-26

## 2022-09-26 PROBLEM — K61.0 PERIANAL ABSCESS: Status: RESOLVED | Noted: 2020-08-31 | Resolved: 2022-09-26

## 2022-09-26 PROBLEM — F17.219 CIGARETTE NICOTINE DEPENDENCE WITH NICOTINE-INDUCED DISORDER: Status: RESOLVED | Noted: 2020-08-31 | Resolved: 2022-09-26

## 2022-09-26 LAB
APPEARANCE FLUID: CLEAR
BILIRUBIN, POC: NORMAL
BLOOD URINE, POC: NEGATIVE
CLARITY, POC: CLEAR
COLOR, POC: NORMAL
GLUCOSE URINE, POC: 100
HBA1C MFR BLD: 7.4 %
KETONES, POC: NORMAL
LEUKOCYTE EST, POC: NEGATIVE
NITRITE, POC: NEGATIVE
PH, POC: 5.5
PROTEIN, POC: 30
SPECIFIC GRAVITY, POC: 1.03
UROBILINOGEN, POC: 2

## 2022-09-26 PROCEDURE — 99214 OFFICE O/P EST MOD 30 MIN: CPT | Performed by: FAMILY MEDICINE

## 2022-09-26 PROCEDURE — 3051F HG A1C>EQUAL 7.0%<8.0%: CPT | Performed by: FAMILY MEDICINE

## 2022-09-26 PROCEDURE — 81002 URINALYSIS NONAUTO W/O SCOPE: CPT | Performed by: FAMILY MEDICINE

## 2022-09-26 PROCEDURE — 83036 HEMOGLOBIN GLYCOSYLATED A1C: CPT | Performed by: FAMILY MEDICINE

## 2022-09-26 ASSESSMENT — ENCOUNTER SYMPTOMS
VOMITING: 0
COUGH: 0
NAUSEA: 0
CHEST TIGHTNESS: 0
SHORTNESS OF BREATH: 0
SORE THROAT: 0
COLOR CHANGE: 0
ABDOMINAL PAIN: 0
DIARRHEA: 0

## 2022-09-26 NOTE — PROGRESS NOTES
Hyperlipidemia associated with type 2 diabetes mellitus ([facility]    Morbid obesity with BMI of 50.0-59.9, adult ([facility]    Uncontrolled type 2 diabetes mellitus with hyperglycemia ([facility]    Personal history of noncompliance with medical treatment and regimen       PSHx:  Past Surgical History:   Procedure Laterality Date    INCISION AND DRAINAGE Right 2016    KNEE IRRIGATION AND DEBRIDEMENT  performed by Carlos Alberto Powers MD at [address] Right 2016    PERONEAL NERVE RELEASE ; RIGHT FIBULAR HEAD  performed by Carlos Alberto Powers MD at [facility] OR       PFHx:  Family History   Problem Relation Age of Onset    Other Mother         blockage    High Blood Pressure Father     Diabetes Father        SocialHx:  Social History     Tobacco Use    Smoking status: Former     Packs/day: 1.00     Years: 20.00     Pack years: 20.00     Types: Cigarettes     Quit date: 2020     Years since quittin.0    Smokeless tobacco: Never   Substance Use Topics    Alcohol use: Yes     Comment: occ       Allergies:  No Known Allergies    Medications:  Current Outpatient Medications   Medication Sig Dispense Refill    gabapentin (NEURONTIN) 400 MG capsule TAKE 1 CAPSULE BY MOUTH THREE TIMES DAILY 90 capsule 0    EQ ALLERGY RELIEF, CETIRIZINE, 10 MG tablet Take 1 tablet by mouth nightly 90 tablet 0    dapagliflozin (FARXIGA) 10 MG tablet Take 1 tablet by mouth every morning 90 tablet 0    Semaglutide, 2 MG/DOSE, (OZEMPIC, 2 MG/DOSE,) 8 MG/3ML SOPN Inject 2 mg into the skin every 7 days 9 mL 3    sildenafil (VIAGRA) 100 MG tablet Take 1 tablet by mouth as needed for Erectile Dysfunction 8 tablet 2    metFORMIN (GLUCOPHAGE) 1000 MG tablet Take 1 tablet by mouth 2 times daily (with meals) 180 tablet 3    diclofenac (VOLTAREN) 50 MG EC tablet Take 1 tablet by mouth 3 times daily as needed for Pain 90 tablet 3    aspirin EC 81 MG EC tablet Take 1 tablet by mouth daily 90 tablet 3     No current facility-administered medications for this visit. Objective:   PE:  BP (!) 132/94   Pulse 94   Temp 97.5 °F (36.4 °C)   Ht 6' 2\" (1.88 m)   Wt (!) 393 lb 9.6 oz (178.5 kg)   SpO2 97%   BMI 50.54 kg/m²   Physical Exam  Vitals and nursing note reviewed. Constitutional:       Appearance: Normal appearance. He is morbidly obese. HENT:      Head: Normocephalic. Right Ear: Tympanic membrane normal.      Left Ear: Tympanic membrane normal.      Nose: Nose normal.      Mouth/Throat:      Mouth: Mucous membranes are moist.      Pharynx: Oropharynx is clear. Eyes:      Extraocular Movements: Extraocular movements intact. Pupils: Pupils are equal, round, and reactive to light. Cardiovascular:      Rate and Rhythm: Normal rate and regular rhythm. Pulses: Normal pulses. Heart sounds: Normal heart sounds. Pulmonary:      Effort: Pulmonary effort is normal.      Breath sounds: Normal breath sounds. Abdominal:      General: Bowel sounds are normal.      Palpations: Abdomen is soft. Musculoskeletal:         General: Normal range of motion. Cervical back: Normal range of motion. Skin:     General: Skin is warm and dry. Neurological:      Mental Status: He is alert and oriented to person, place, and time. Comments: +R foot drop   Psychiatric:         Mood and Affect: Mood normal.         Behavior: Behavior normal.         Thought Content: Thought content normal.         Judgment: Judgment normal.          Assessment & Plan   Baljinder Romo was seen today for diabetes. 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   Engaged in regular exercise as tolerated - at least 30 minutes/day  Low fat/low calorie diet  Daily foot care  Recommend annual diabetic eye exam  Encouraged to d/c smoking  Stay well hydrated - 64 oz of water a day  Keep scheduled follow-up visit with me and with other specialist  Call with new concerns     Return in 3 months (on 12/21/2022) for routine follow-up, chronic care management, med check, med refills. All questions were answered. Medications, including possible adverse effects, and instructions were reviewed and  understanding was confirmed. Follow-up recommendations, including when to contact or return to office (ie; if symptoms worsen or fail to improve), were discussed and acknowledged.     Electronically signed by Annelise Parker MD on 9/26/22 at 12:12 PM CDT

## 2022-10-29 DIAGNOSIS — E11.42 TYPE 2 DIABETES MELLITUS WITH DIABETIC POLYNEUROPATHY, WITHOUT LONG-TERM CURRENT USE OF INSULIN (HCC): ICD-10-CM

## 2022-10-29 DIAGNOSIS — M54.12 CERVICAL RADICULOPATHY: ICD-10-CM

## 2022-10-30 NOTE — TELEPHONE ENCOUNTER
Mariangel Villeda called to request a refill on his medication. Last office visit : 9/26/2022   Next office visit : 1/9/2023     Last UDS: No results found for: [place], LABBENZ, BUPRENUR, COCAIMETSCRU, GABAPENTIN, MDMA, METAMPU, OPIATESCREENURINE, OXTCOSU, PHENCYCLIDINESCREENURINE, PROPOXYPHENE, THCSCREENUR, TRICYUR    Last Emma : 8-21-21  Medication Contract: 2017  Last Fill: 8-23-22    Requested Prescriptions     Pending Prescriptions Disp Refills    gabapentin (NEURONTIN) 400 MG capsule [Pharmacy Med Name: Gabapentin 400 MG Oral Capsule] 90 capsule 0     Sig: TAKE 1 CAPSULE BY MOUTH THREE TIMES DAILY    EQ ALLERGY RELIEF, CETIRIZINE, 10 MG tablet [Pharmacy Med Name: EQ Allergy Relief (Cetirizine) 10 MG Oral Tablet] 90 tablet 0     Sig: Take 1 tablet by mouth nightly             Please approve or refuse this medication.    Neo Nunez LPN The patient is a DNR we did give him the 5 wishes booklet the he did not fit yet we discussed the patient important discussed the his wishes with the family and have a copy at home and copy at his the medical record

## 2022-10-31 ENCOUNTER — OFFICE VISIT (OUTPATIENT)
Dept: PRIMARY CARE CLINIC | Age: 42
End: 2022-10-31
Payer: COMMERCIAL

## 2022-10-31 VITALS
TEMPERATURE: 98.7 F | SYSTOLIC BLOOD PRESSURE: 122 MMHG | OXYGEN SATURATION: 97 % | HEIGHT: 74 IN | BODY MASS INDEX: 40.43 KG/M2 | HEART RATE: 90 BPM | WEIGHT: 315 LBS | DIASTOLIC BLOOD PRESSURE: 88 MMHG

## 2022-10-31 DIAGNOSIS — J06.9 UPPER RESPIRATORY TRACT INFECTION, UNSPECIFIED TYPE: ICD-10-CM

## 2022-10-31 DIAGNOSIS — R50.9 FEVER, UNSPECIFIED FEVER CAUSE: ICD-10-CM

## 2022-10-31 DIAGNOSIS — E11.65 UNCONTROLLED TYPE 2 DIABETES MELLITUS WITH HYPERGLYCEMIA (HCC): ICD-10-CM

## 2022-10-31 DIAGNOSIS — J20.9 ACUTE BRONCHITIS WITH BRONCHOSPASM: Primary | ICD-10-CM

## 2022-10-31 DIAGNOSIS — H65.93 MIDDLE EAR EFFUSION, BILATERAL: ICD-10-CM

## 2022-10-31 PROCEDURE — 99214 OFFICE O/P EST MOD 30 MIN: CPT | Performed by: FAMILY MEDICINE

## 2022-10-31 PROCEDURE — 3051F HG A1C>EQUAL 7.0%<8.0%: CPT | Performed by: FAMILY MEDICINE

## 2022-10-31 RX ORDER — AZITHROMYCIN 250 MG/1
TABLET, FILM COATED ORAL
Qty: 6 TABLET | Refills: 0 | Status: SHIPPED | OUTPATIENT
Start: 2022-10-31

## 2022-10-31 RX ORDER — GABAPENTIN 400 MG/1
CAPSULE ORAL
Qty: 90 CAPSULE | Refills: 0 | Status: SHIPPED | OUTPATIENT
Start: 2022-10-31 | End: 2022-11-30

## 2022-10-31 RX ORDER — CETIRIZINE HYDROCHLORIDE 10 MG/1
TABLET, FILM COATED ORAL
Qty: 90 TABLET | Refills: 0 | Status: SHIPPED | OUTPATIENT
Start: 2022-10-31

## 2022-10-31 NOTE — PROGRESS NOTES
[address] [facility]  [address] [address] [place] [address]  Dept: [phone]  Dept Fax: [phone]  Loc: [phone]      Subjective:     Chief Complaint   Patient presents with    Otalgia    Congestion    Cough    Fever       HPI:  Karen Li is a 36 y.o. male presents today with above symptoms x 2 days ago.  + subjective fever, dry cough, congestion, ST. Appetite is down. Everything tastes \"funky\". GF had similar symptoms.      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    Generalized anxiety disorder    Psychophysiological insomnia    Hyperlipidemia associated with type 2 diabetes mellitus ([address]    Morbid obesity with BMI of 50.0-59.9, adult ([address]    Uncontrolled type 2 diabetes mellitus with hyperglycemia ([address]    Personal history of noncompliance with medical treatment and regimen       PSHx:  Past Surgical History:   Procedure Laterality Date    INCISION AND DRAINAGE Right 7/7/2016    KNEE IRRIGATION AND DEBRIDEMENT  performed by Aniket Florez MD at [address] Right 5/31/2016    PERONEAL NERVE RELEASE ; RIGHT FIBULAR HEAD  performed by Aniket Florez MD at [facility] OR       PFHx:  Family History   Problem Relation Age of Onset    Other Mother         blockage    High Blood Pressure Father     Diabetes Father        SocialHx:  Social History     Tobacco Use    Smoking status: Former     Packs/day: 1.00     Years: 20.00     Pack years: 20.00     Types: Cigarettes     Quit date: 2020     Years since quittin.1    Smokeless tobacco: Never   Substance Use Topics    Alcohol use: Yes     Comment: occ       Allergies:  No Known Allergies    Medications:  Current Outpatient Medications   Medication Sig Dispense Refill    gabapentin (NEURONTIN) 400 MG capsule TAKE 1 CAPSULE BY MOUTH THREE TIMES DAILY 90 capsule 0    EQ ALLERGY RELIEF, CETIRIZINE, 10 MG tablet Take 1 tablet by mouth nightly 90 tablet 0    azithromycin (ZITHROMAX) 250 MG tablet Take 2 tablets on day 1 and then 1 po daily x 4 days 6 tablet 0    dapagliflozin (FARXIGA) 10 MG tablet Take 1 tablet by mouth every morning 90 tablet 0    Semaglutide, 2 MG/DOSE, (OZEMPIC, 2 MG/DOSE,) 8 MG/3ML SOPN Inject 2 mg into the skin every 7 days 9 mL 3    sildenafil (VIAGRA) 100 MG tablet Take 1 tablet by mouth as needed for Erectile Dysfunction 8 tablet 2    metFORMIN (GLUCOPHAGE) 1000 MG tablet Take 1 tablet by mouth 2 times daily (with meals) 180 tablet 3    diclofenac (VOLTAREN) 50 MG EC tablet Take 1 tablet by mouth 3 times daily as needed for Pain 90 tablet 3    aspirin EC 81 MG EC tablet Take 1 tablet by mouth daily 90 tablet 3     No current facility-administered medications for this visit. Objective:   PE:  /88   Pulse 90   Temp 98.7 °F (37.1 °C)   Ht 6' 2\" (1.88 m)   Wt (!) 397 lb 6.4 oz (180.3 kg)   SpO2 97%   BMI 51.02 kg/m²   Physical Exam  Vitals and nursing note reviewed. Constitutional:       Appearance: He is obese. He is ill-appearing. HENT:      Head: Normocephalic. Right Ear: Tympanic membrane normal.      Left Ear: Tympanic membrane normal.      Ears:      Comments: Bilateral middle ear effusion     Nose: Nose normal.      Mouth/Throat:      Mouth: Mucous membranes are moist.      Pharynx: Oropharynx is clear. Eyes:      Extraocular Movements: Extraocular movements intact. Pupils: Pupils are equal, round, and reactive to light.    Cardiovascular:      Rate and Rhythm: Normal rate and regular rhythm. Pulses: Normal pulses. Heart sounds: Normal heart sounds. Pulmonary:      Breath sounds: Rhonchi present. Abdominal:      General: Bowel sounds are normal.      Palpations: Abdomen is soft. Tenderness: There is no abdominal tenderness. There is no guarding. Comments: obese   Musculoskeletal:         General: Normal range of motion. Cervical back: Normal range of motion. Lymphadenopathy:      Cervical: No cervical adenopathy. Skin:     General: Skin is warm and dry. Findings: No rash. Neurological:      Mental Status: He is alert and oriented to person, place, and time. Comments: +R foot drop   Psychiatric:         Mood and Affect: Mood normal.         Behavior: Behavior normal.          Assessment & Plan   Alta Saha was seen today for otalgia, congestion, cough and fever. Diagnoses and all orders for this visit:    Acute bronchitis with bronchospasm  -     azithromycin (ZITHROMAX) 250 MG tablet; Take 2 tablets on day 1 and then 1 po daily x 4 days    Upper respiratory tract infection, unspecified type  -     azithromycin (ZITHROMAX) 250 MG tablet; Take 2 tablets on day 1 and then 1 po daily x 4 days    Fever, unspecified fever cause    Middle ear effusion, bilateral  -     azithromycin (ZITHROMAX) 250 MG tablet; Take 2 tablets on day 1 and then 1 po daily x 4 days    Uncontrolled type 2 diabetes mellitus with hyperglycemia (HCC)    Increase oral fluid  Warm saline gargles  Keep BS under good control  Okay to take OTC Mucinex multi symptom    Return for routine follow-up with me Jan 9/23. All questions were answered. Medications, including possible adverse effects, and instructions were reviewed and  understanding was confirmed. Follow-up recommendations, including when to contact or return to office (ie; if symptoms worsen or fail to improve), were discussed and acknowledged.     Electronically signed by Evy Jerome MD on 10/31/22 at 4:01 PM CDT

## 2022-11-03 ENCOUNTER — TELEPHONE (OUTPATIENT)
Dept: PRIMARY CARE CLINIC | Age: 42
End: 2022-11-03

## 2022-11-03 NOTE — TELEPHONE ENCOUNTER
Patient called and stated he is still feeling sick and wants to know if additional medication can be called in for him. Please advise?

## 2022-11-07 ENCOUNTER — TELEPHONE (OUTPATIENT)
Dept: PRIMARY CARE CLINIC | Age: 42
End: 2022-11-07

## 2022-11-07 NOTE — TELEPHONE ENCOUNTER
Patient called and stated he went to get his ozempic filled and the price has increased and he is needing it changed to a different medication please advise?

## 2022-11-08 NOTE — TELEPHONE ENCOUNTER
Please have pt call his insurance to get a list of his formulary - I wonder if Trulicity is covered under his insurance

## 2022-11-10 RX ORDER — DULAGLUTIDE 0.75 MG/.5ML
0.75 INJECTION, SOLUTION SUBCUTANEOUS WEEKLY
Qty: 1 ADJUSTABLE DOSE PRE-FILLED PEN SYRINGE | Refills: 2 | Status: SHIPPED | OUTPATIENT
Start: 2022-11-10

## 2023-01-09 ENCOUNTER — OFFICE VISIT (OUTPATIENT)
Dept: PRIMARY CARE CLINIC | Age: 43
End: 2023-01-09
Payer: COMMERCIAL

## 2023-01-09 VITALS
HEART RATE: 86 BPM | WEIGHT: 315 LBS | SYSTOLIC BLOOD PRESSURE: 144 MMHG | HEIGHT: 73 IN | BODY MASS INDEX: 41.75 KG/M2 | OXYGEN SATURATION: 96 % | DIASTOLIC BLOOD PRESSURE: 104 MMHG | TEMPERATURE: 97.2 F

## 2023-01-09 DIAGNOSIS — R09.81 CHRONIC NASAL CONGESTION: ICD-10-CM

## 2023-01-09 DIAGNOSIS — J30.2 SEASONAL ALLERGIES: ICD-10-CM

## 2023-01-09 DIAGNOSIS — I10 PRIMARY HYPERTENSION: ICD-10-CM

## 2023-01-09 DIAGNOSIS — E11.42 TYPE 2 DIABETES MELLITUS WITH DIABETIC POLYNEUROPATHY, WITHOUT LONG-TERM CURRENT USE OF INSULIN (HCC): Primary | ICD-10-CM

## 2023-01-09 LAB — HBA1C MFR BLD: 8.2 %

## 2023-01-09 PROCEDURE — 3080F DIAST BP >= 90 MM HG: CPT | Performed by: FAMILY MEDICINE

## 2023-01-09 PROCEDURE — 3077F SYST BP >= 140 MM HG: CPT | Performed by: FAMILY MEDICINE

## 2023-01-09 PROCEDURE — 83036 HEMOGLOBIN GLYCOSYLATED A1C: CPT | Performed by: FAMILY MEDICINE

## 2023-01-09 PROCEDURE — 3052F HG A1C>EQUAL 8.0%<EQUAL 9.0%: CPT | Performed by: FAMILY MEDICINE

## 2023-01-09 PROCEDURE — 99214 OFFICE O/P EST MOD 30 MIN: CPT | Performed by: FAMILY MEDICINE

## 2023-01-09 RX ORDER — IPRATROPIUM BROMIDE 42 UG/1
1 SPRAY, METERED NASAL 3 TIMES DAILY
Qty: 15 ML | Refills: 0 | Status: SHIPPED | OUTPATIENT
Start: 2023-01-09

## 2023-01-09 RX ORDER — MONTELUKAST SODIUM 10 MG/1
10 TABLET ORAL DAILY
Qty: 30 TABLET | Refills: 2 | Status: SHIPPED | OUTPATIENT
Start: 2023-01-09

## 2023-01-09 RX ORDER — METHYLPREDNISOLONE 4 MG/1
TABLET ORAL
Qty: 1 KIT | Refills: 0 | Status: SHIPPED | OUTPATIENT
Start: 2023-01-09

## 2023-01-09 RX ORDER — LORATADINE AND PSEUDOEPHEDRINE SULFATE 10; 240 MG/1; MG/1
1 TABLET, EXTENDED RELEASE ORAL DAILY
Qty: 15 TABLET | Refills: 0 | Status: SHIPPED | OUTPATIENT
Start: 2023-01-09

## 2023-01-09 ASSESSMENT — ENCOUNTER SYMPTOMS
VOMITING: 0
DIARRHEA: 0
NAUSEA: 0
COUGH: 0
CHEST TIGHTNESS: 0
COLOR CHANGE: 0
SHORTNESS OF BREATH: 0
ABDOMINAL PAIN: 0
SORE THROAT: 0

## 2023-01-09 ASSESSMENT — PATIENT HEALTH QUESTIONNAIRE - PHQ9
SUM OF ALL RESPONSES TO PHQ QUESTIONS 1-9: 0
SUM OF ALL RESPONSES TO PHQ9 QUESTIONS 1 & 2: 0
SUM OF ALL RESPONSES TO PHQ QUESTIONS 1-9: 0
2. FEELING DOWN, DEPRESSED OR HOPELESS: 0
1. LITTLE INTEREST OR PLEASURE IN DOING THINGS: 0

## 2023-01-09 NOTE — PROGRESS NOTES
[address]  [address] [address]  [address] [place] [address]  Dept: [phone]  Dept Fax: [phone]  Loc: [phone]      Subjective:     Chief Complaint   Patient presents with    3 Month Follow-Up       HPI:  Alex Silva is a 36 y.o. male presents today for his 3 month follow-up  He admits that his BS had been running high. He only takes Ozempic 2 mg once a week. He takes Metformin 2 times a day. A1c today is 8.2%  He also states that he has been battling persistent ear pain and chronic nasal congestion. Cough is productive of clear mucus    ROS:   Review of Systems   Constitutional:  Negative for activity change and fever. HENT:  Positive for congestion and ear pain. Negative for sore throat. Drooling: nasal.   Respiratory:  Negative for cough, chest tightness and shortness of breath. Cardiovascular:  Negative for chest pain. Gastrointestinal:  Negative for abdominal pain, diarrhea, nausea and vomiting. Genitourinary:  Negative for frequency (improved after stopping  Jardiance) and urgency. Musculoskeletal:  Negative for arthralgias and myalgias. Skin:  Positive for rash. Negative for color change. Neurological:  Negative for dizziness, weakness and numbness. Psychiatric/Behavioral:  Negative for agitation. The patient is not nervous/anxious.       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    Generalized anxiety disorder    Psychophysiological insomnia    Hyperlipidemia associated with type 2 diabetes mellitus ([address] Morbid obesity with BMI of 50.0-59.9, adult [facility]    Uncontrolled type 2 diabetes mellitus with hyperglycemia ([facility]    Personal history of noncompliance with medical treatment and regimen       PSHx:  Past Surgical History:   Procedure Laterality Date    INCISION AND DRAINAGE Right 2016    KNEE IRRIGATION AND DEBRIDEMENT  performed by Felisa Aguilar MD at [address] Right 2016    PERONEAL NERVE RELEASE ; RIGHT FIBULAR HEAD  performed by Felisa Aguilar MD at [facility] OR       PFHx:  Family History   Problem Relation Age of Onset    Other Mother         blockage    High Blood Pressure Father     Diabetes Father        SocialHx:  Social History     Tobacco Use    Smoking status: Former     Packs/day: 1.00     Years: 20.00     Pack years: 20.00     Types: Cigarettes     Quit date: 2020     Years since quittin.3    Smokeless tobacco: Never   Substance Use Topics    Alcohol use: Yes     Comment: occ       Allergies:  No Known Allergies    Medications:  Current Outpatient Medications   Medication Sig Dispense Refill    loratadine-pseudoephedrine (CLARITIN-D 24 HOUR)  MG per extended release tablet Take 1 tablet by mouth daily 15 tablet 0    montelukast (SINGULAIR) 10 MG tablet Take 1 tablet by mouth daily Take at bedtime 30 tablet 2    methylPREDNISolone (MEDROL DOSEPACK) 4 MG tablet Take by mouth.  1 kit 0    ipratropium (ATROVENT) 0.06 % nasal spray 1 spray by Each Nostril route 3 times daily 15 mL 0    Dulaglutide (TRULICITY) 7.93 QU/0.3VH SOPN Inject 0.75 mg into the skin once a week 1 Adjustable Dose Pre-filled Pen Syringe 2    gabapentin (NEURONTIN) 400 MG capsule TAKE 1 CAPSULE BY MOUTH THREE TIMES DAILY 90 capsule 0    EQ ALLERGY RELIEF, CETIRIZINE, 10 MG tablet Take 1 tablet by mouth nightly 90 tablet 0    azithromycin (ZITHROMAX) 250 MG tablet Take 2 tablets on day 1 and then 1 po daily x 4 days 6 tablet 0    dapagliflozin (FARXIGA) 10 MG tablet Take 1 tablet by mouth every morning 90 tablet 0    Semaglutide, 2 MG/DOSE, (OZEMPIC, 2 MG/DOSE,) 8 MG/3ML SOPN Inject 2 mg into the skin every 7 days 9 mL 3    sildenafil (VIAGRA) 100 MG tablet Take 1 tablet by mouth as needed for Erectile Dysfunction 8 tablet 2    metFORMIN (GLUCOPHAGE) 1000 MG tablet Take 1 tablet by mouth 2 times daily (with meals) 180 tablet 3    diclofenac (VOLTAREN) 50 MG EC tablet Take 1 tablet by mouth 3 times daily as needed for Pain 90 tablet 3    aspirin EC 81 MG EC tablet Take 1 tablet by mouth daily 90 tablet 3     No current facility-administered medications for this visit. Objective:   PE:  BP (!) 144/104   Pulse 86   Temp 97.2 °F (36.2 °C) (Temporal)   Ht 6' 1\" (1.854 m)   Wt (!) 398 lb 12.8 oz (180.9 kg)   SpO2 96%   BMI 52.62 kg/m²   Physical Exam  Vitals and nursing note reviewed. Constitutional:       Appearance: Normal appearance. He is morbidly obese. HENT:      Head: Normocephalic. Right Ear: External ear normal.      Left Ear: External ear normal.      Nose: Nose normal.      Mouth/Throat:      Mouth: Mucous membranes are moist.      Pharynx: Oropharynx is clear. Eyes:      Extraocular Movements: Extraocular movements intact. Pupils: Pupils are equal, round, and reactive to light. Cardiovascular:      Rate and Rhythm: Normal rate and regular rhythm. Pulses: Normal pulses. Heart sounds: Normal heart sounds. Pulmonary:      Effort: Pulmonary effort is normal.      Breath sounds: Normal breath sounds. Abdominal:      General: Bowel sounds are normal.      Palpations: Abdomen is soft. Musculoskeletal:         General: No swelling. Normal range of motion. Cervical back: Normal range of motion. Skin:     General: Skin is warm and dry. Neurological:      Mental Status: He is alert and oriented to person, place, and time. Mental status is at baseline.       Gait: Gait abnormal.      Comments: +R foot drop   Psychiatric:         Mood and Affect: Mood normal. Behavior: Behavior normal.         Thought Content: Thought content normal.          Assessment & Plan   Fernando Wilson was seen today for 3 month follow-up. Diagnoses and all orders for this visit:    Type 2 diabetes mellitus with diabetic polyneuropathy, without long-term current use of insulin ([facility])  -     POCT glycosylated hemoglobin (Hb A1C)    Seasonal allergies  -     loratadine-pseudoephedrine (CLARITIN-D 24 HOUR)  MG per extended release tablet; Take 1 tablet by mouth daily  -     montelukast (SINGULAIR) 10 MG tablet; Take 1 tablet by mouth daily Take at bedtime    Chronic nasal congestion  -     methylPREDNISolone (MEDROL DOSEPACK) 4 MG tablet; Take by mouth. -     ipratropium (ATROVENT) 0.06 % nasal spray; 1 spray by Each Nostril route 3 times daily    Primary hypertension    BMI 50.0-59.9, adult ([address]    Start taking Marilynne John Paul as directed  Keep BS under tighter control  Heart healthy diet. Continue to monitor BS and keep a record  Stay on diabetic diet  Continue efforts at weight loss    Return in about 4 weeks (around 2/6/2023). All questions were answered. Medications, including possible adverse effects, and instructions were reviewed and  understanding was confirmed. Follow-up recommendations, including when to contact or return to office (ie; if symptoms worsen or fail to improve), were discussed and acknowledged.     Electronically signed by Winston Carpenter MD on 1/9/23 at 11:43 AM CST

## 2023-02-03 DIAGNOSIS — E11.42 TYPE 2 DIABETES MELLITUS WITH DIABETIC POLYNEUROPATHY, WITHOUT LONG-TERM CURRENT USE OF INSULIN (HCC): ICD-10-CM

## 2023-02-03 DIAGNOSIS — M54.12 CERVICAL RADICULOPATHY: ICD-10-CM

## 2023-02-06 ENCOUNTER — OFFICE VISIT (OUTPATIENT)
Dept: PRIMARY CARE CLINIC | Age: 43
End: 2023-02-06
Payer: COMMERCIAL

## 2023-02-06 VITALS
SYSTOLIC BLOOD PRESSURE: 156 MMHG | WEIGHT: 315 LBS | DIASTOLIC BLOOD PRESSURE: 98 MMHG | OXYGEN SATURATION: 96 % | HEART RATE: 86 BPM | HEIGHT: 73 IN | BODY MASS INDEX: 41.75 KG/M2 | TEMPERATURE: 96.8 F

## 2023-02-06 DIAGNOSIS — E78.5 HYPERLIPIDEMIA ASSOCIATED WITH TYPE 2 DIABETES MELLITUS (HCC): ICD-10-CM

## 2023-02-06 DIAGNOSIS — Z76.0 MEDICATION REFILL: ICD-10-CM

## 2023-02-06 DIAGNOSIS — M54.12 CERVICAL RADICULOPATHY: ICD-10-CM

## 2023-02-06 DIAGNOSIS — E11.42 TYPE 2 DIABETES MELLITUS WITH DIABETIC POLYNEUROPATHY, WITHOUT LONG-TERM CURRENT USE OF INSULIN (HCC): Primary | ICD-10-CM

## 2023-02-06 DIAGNOSIS — I10 PRIMARY HYPERTENSION: ICD-10-CM

## 2023-02-06 DIAGNOSIS — J30.2 SEASONAL ALLERGIES: ICD-10-CM

## 2023-02-06 DIAGNOSIS — E11.69 HYPERLIPIDEMIA ASSOCIATED WITH TYPE 2 DIABETES MELLITUS (HCC): ICD-10-CM

## 2023-02-06 PROCEDURE — 3077F SYST BP >= 140 MM HG: CPT | Performed by: FAMILY MEDICINE

## 2023-02-06 PROCEDURE — 99215 OFFICE O/P EST HI 40 MIN: CPT | Performed by: FAMILY MEDICINE

## 2023-02-06 PROCEDURE — 3052F HG A1C>EQUAL 8.0%<EQUAL 9.0%: CPT | Performed by: FAMILY MEDICINE

## 2023-02-06 PROCEDURE — 3080F DIAST BP >= 90 MM HG: CPT | Performed by: FAMILY MEDICINE

## 2023-02-06 RX ORDER — LORATADINE AND PSEUDOEPHEDRINE SULFATE 10; 240 MG/1; MG/1
1 TABLET, EXTENDED RELEASE ORAL DAILY
Qty: 15 TABLET | Refills: 0 | Status: SHIPPED | OUTPATIENT
Start: 2023-02-06

## 2023-02-06 RX ORDER — SILDENAFIL 100 MG/1
100 TABLET, FILM COATED ORAL PRN
Qty: 8 TABLET | Refills: 2 | Status: SHIPPED | OUTPATIENT
Start: 2023-02-06

## 2023-02-06 RX ORDER — GABAPENTIN 400 MG/1
CAPSULE ORAL
Qty: 90 CAPSULE | Refills: 0 | Status: SHIPPED | OUTPATIENT
Start: 2023-02-06 | End: 2023-03-06

## 2023-02-06 ASSESSMENT — ENCOUNTER SYMPTOMS
CHEST TIGHTNESS: 0
SORE THROAT: 0
VOMITING: 0
NAUSEA: 0
COLOR CHANGE: 0
SHORTNESS OF BREATH: 0
COUGH: 1
DIARRHEA: 0
ABDOMINAL PAIN: 0

## 2023-02-06 NOTE — PROGRESS NOTES
[address] [facility]  [address] [address] [place] [address]  Dept: [phone]  Dept Fax: [phone]  Loc: [phone]      Subjective:     Chief Complaint   Patient presents with    1 Month Follow-Up       HPI:  Sanjuanita Dominguez is a 36 y.o. male presents today with pain in both ears. He is still coughing . No fever. No sinus congestion or pain. He states that Claritin D helped when I prescribed I to him last time. He claims to be using his Flonase regularly. Chronic  meds reviewed. He is requesting refills on some of his meds. He stopped taking the Farxiga 10 mg. He is a diabetic . His BS has been elevated again. He states that he cannot tolerate the [place]. He states he did well when he was on Jardiance but for some reason his insurance does not cover it. He is a . He struggles with keeping his BS under control because he is on the road a lot. BP is sl elevated today. No CP or sob. He takes Gabapentin one to two times a day for DPN and cervical DJD with radiculopathy      ROS:   Review of Systems   Constitutional:  Negative for activity change and fever. HENT:  Positive for ear pain. Negative for congestion and sore throat. Drooling: nasal.   Respiratory:  Positive for cough. Negative for chest tightness and shortness of breath. Cardiovascular:  Negative for chest pain. Gastrointestinal:  Negative for abdominal pain, diarrhea, nausea and vomiting. Genitourinary:  Negative for frequency (improved after stopping  Jardiance) and urgency. Musculoskeletal:  Negative for arthralgias and myalgias. Skin:  Positive for rash. Negative for color change. Neurological:  Negative for dizziness, weakness and numbness. Psychiatric/Behavioral:  Negative for agitation. The patient is not nervous/anxious.       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    Generalized anxiety disorder    Psychophysiological insomnia    Hyperlipidemia associated with type 2 diabetes mellitus ([facility]    Morbid obesity with BMI of 50.0-59.9, adult ([facility]    Uncontrolled type 2 diabetes mellitus with hyperglycemia ([facility]    Personal history of noncompliance with medical treatment and regimen       PSHx:  Past Surgical History:   Procedure Laterality Date    INCISION AND DRAINAGE Right 2016    KNEE IRRIGATION AND DEBRIDEMENT  performed by Yan Bowens MD at [address] Right 2016    PERONEAL NERVE RELEASE ; RIGHT FIBULAR HEAD  performed by Yan Bowens MD at [facility] OR       PFHx:  Family History   Problem Relation Age of Onset    Other Mother         blockage    High Blood Pressure Father     Diabetes Father        SocialHx:  Social History     Tobacco Use    Smoking status: Former     Packs/day: 1.00     Years: 20.00     Pack years: 20.00     Types: Cigarettes     Quit date: 2020     Years since quittin.4    Smokeless tobacco: Never   Substance Use Topics    Alcohol use: Yes     Comment: occ       Allergies:  No Known Allergies    Medications:  Current Outpatient Medications   Medication Sig Dispense Refill    metFORMIN (GLUCOPHAGE) 1000 MG tablet Take 1 tablet by mouth 2 times daily (with meals) 180 tablet 3    loratadine-pseudoephedrine (CLARITIN-D 24 HOUR)  MG per extended release tablet Take 1 tablet by mouth daily 15 tablet 0    sildenafil (VIAGRA) 100 MG tablet Take 1 tablet by mouth as needed for Erectile Dysfunction 8 tablet 2    gabapentin (NEURONTIN) 400 MG capsule TAKE 1 CAPSULE BY MOUTH THREE TIMES DAILY 90 capsule 0    montelukast (SINGULAIR) 10 MG tablet Take 1 tablet by mouth daily Take at bedtime 30 tablet 2    ipratropium (ATROVENT) 0.06 % nasal spray 1 spray by Each Nostril route 3 times daily 15 mL 0    EQ ALLERGY RELIEF, CETIRIZINE, 10 MG tablet Take 1 tablet by mouth nightly 90 tablet 0    Semaglutide, 2 MG/DOSE, (OZEMPIC, 2 MG/DOSE,) 8 MG/3ML SOPN Inject 2 mg into the skin every 7 days 9 mL 3    diclofenac (VOLTAREN) 50 MG EC tablet Take 1 tablet by mouth 3 times daily as needed for Pain 90 tablet 3    aspirin EC 81 MG EC tablet Take 1 tablet by mouth daily 90 tablet 3     No current facility-administered medications for this visit. Objective:   PE:  BP (!) 156/98   Pulse 86   Temp 96.8 °F (36 °C) (Temporal)   Ht 6' 1\" (1.854 m)   Wt (!) 400 lb 12.8 oz (181.8 kg)   SpO2 96%   BMI 52.88 kg/m²   Physical Exam  Vitals and nursing note reviewed. Constitutional:       Appearance: Normal appearance. He is morbidly obese. HENT:      Head: Normocephalic. Right Ear: External ear normal.      Left Ear: External ear normal.      Nose: Nose normal.      Mouth/Throat:      Mouth: Mucous membranes are moist.      Pharynx: Oropharynx is clear. Eyes:      Extraocular Movements: Extraocular movements intact. Pupils: Pupils are equal, round, and reactive to light. Cardiovascular:      Rate and Rhythm: Normal rate and regular rhythm. Pulses: Normal pulses. Heart sounds: Normal heart sounds. Pulmonary:      Effort: Pulmonary effort is normal.      Breath sounds: Normal breath sounds. Abdominal:      General: Bowel sounds are normal.      Palpations: Abdomen is soft. Musculoskeletal:         General: No swelling. Normal range of motion. Cervical back: Normal range of motion. Skin:     General: Skin is warm and dry. Neurological:      Mental Status: He is alert and oriented to person, place, and time. Mental status is at baseline.       Gait: Gait abnormal.      Comments: +R foot drop   Psychiatric:         Mood and Affect: Mood normal.         Behavior: Behavior normal.          Assessment & Plan   Stevie Christy was seen today for 1 month follow-up. Diagnoses and all orders for this visit:    Type 2 diabetes mellitus with diabetic polyneuropathy, without long-term current use of insulin (HCC)  -     metFORMIN (GLUCOPHAGE) 1000 MG tablet; Take 1 tablet by mouth 2 times daily (with meals)  -     gabapentin (NEURONTIN) 400 MG capsule; TAKE 1 CAPSULE BY MOUTH THREE TIMES DAILY  -     Hemoglobin A1C; Future    Primary hypertension  -     Comprehensive Metabolic Panel; Future    Hyperlipidemia associated with type 2 diabetes mellitus (HCC)    Seasonal allergies  -     loratadine-pseudoephedrine (CLARITIN-D 24 HOUR)  MG per extended release tablet; Take 1 tablet by mouth daily    Cervical radiculopathy  -     gabapentin (NEURONTIN) 400 MG capsule; TAKE 1 CAPSULE BY MOUTH THREE TIMES DAILY  -     Lipid Panel; Future    Medication refill  -     metFORMIN (GLUCOPHAGE) 1000 MG tablet; Take 1 tablet by mouth 2 times daily (with meals)  -     loratadine-pseudoephedrine (CLARITIN-D 24 HOUR)  MG per extended release tablet; Take 1 tablet by mouth daily  -     sildenafil (VIAGRA) 100 MG tablet; Take 1 tablet by mouth as needed for Erectile Dysfunction  -     gabapentin (NEURONTIN) 400 MG capsule; TAKE 1 CAPSULE BY MOUTH THREE TIMES DAILY    Samples of Jardiance 25 mg given # 28  Continue present care and management  Continue all other maintenance medications  Encouraged to continue healthy lifestyle change  Engage in regular exercise daily -30 minutes a day as tolerated  Stay well and hydrated - drink at least 64 oz of fluid a day  Eat 6 servings of fruit and vegetables daily  Encourage to lose weight  Check BP at home and keep a record  Call with new concerns       Return in about 4 months (around 6/6/2023) for chronic care management, lab review, med check, med refills.     All questions were answered. Medications, including possible adverse effects, and instructions were reviewed and  understanding was confirmed. Follow-up recommendations, including when to contact or return to office (ie; if symptoms worsen or fail to improve), were discussed and acknowledged.     Electronically signed by Priya Taveras MD on 2/6/23 at 10:33 AM CST

## 2023-02-07 RX ORDER — GABAPENTIN 400 MG/1
CAPSULE ORAL
Qty: 90 CAPSULE | Refills: 0 | OUTPATIENT
Start: 2023-02-07 | End: 2023-03-09

## 2023-05-30 DIAGNOSIS — M54.12 CERVICAL RADICULOPATHY: ICD-10-CM

## 2023-05-30 DIAGNOSIS — I10 PRIMARY HYPERTENSION: ICD-10-CM

## 2023-05-30 DIAGNOSIS — E11.42 TYPE 2 DIABETES MELLITUS WITH DIABETIC POLYNEUROPATHY, WITHOUT LONG-TERM CURRENT USE OF INSULIN (HCC): ICD-10-CM

## 2023-05-30 LAB
ALBUMIN SERPL-MCNC: 4.3 G/DL (ref 3.5–5.2)
ALP SERPL-CCNC: 66 U/L (ref 40–130)
ALT SERPL-CCNC: 55 U/L (ref 5–41)
ANION GAP SERPL CALCULATED.3IONS-SCNC: 11 MMOL/L (ref 7–19)
AST SERPL-CCNC: 41 U/L (ref 5–40)
BILIRUB SERPL-MCNC: 0.3 MG/DL (ref 0.2–1.2)
BUN SERPL-MCNC: 14 MG/DL (ref 6–20)
CALCIUM SERPL-MCNC: 9.5 MG/DL (ref 8.6–10)
CHLORIDE SERPL-SCNC: 103 MMOL/L (ref 98–111)
CHOLEST SERPL-MCNC: 187 MG/DL (ref 160–199)
CO2 SERPL-SCNC: 26 MMOL/L (ref 22–29)
CREAT SERPL-MCNC: 0.7 MG/DL (ref 0.5–1.2)
GLUCOSE SERPL-MCNC: 219 MG/DL (ref 74–109)
HBA1C MFR BLD: 8.2 % (ref 4–6)
HDLC SERPL-MCNC: 40 MG/DL (ref 55–121)
LDLC SERPL CALC-MCNC: 98 MG/DL
POTASSIUM SERPL-SCNC: 4.6 MMOL/L (ref 3.5–5)
PROT SERPL-MCNC: 7.7 G/DL (ref 6.6–8.7)
SODIUM SERPL-SCNC: 140 MMOL/L (ref 136–145)
TRIGL SERPL-MCNC: 245 MG/DL (ref 0–149)

## 2023-06-05 ENCOUNTER — OFFICE VISIT (OUTPATIENT)
Dept: PRIMARY CARE CLINIC | Age: 43
End: 2023-06-05
Payer: COMMERCIAL

## 2023-06-05 VITALS
SYSTOLIC BLOOD PRESSURE: 132 MMHG | BODY MASS INDEX: 41.75 KG/M2 | OXYGEN SATURATION: 98 % | HEIGHT: 73 IN | HEART RATE: 94 BPM | WEIGHT: 315 LBS | DIASTOLIC BLOOD PRESSURE: 86 MMHG | TEMPERATURE: 96.6 F

## 2023-06-05 DIAGNOSIS — E11.42 TYPE 2 DIABETES MELLITUS WITH DIABETIC POLYNEUROPATHY, WITHOUT LONG-TERM CURRENT USE OF INSULIN (HCC): Primary | ICD-10-CM

## 2023-06-05 DIAGNOSIS — Z76.0 MEDICATION REFILL: ICD-10-CM

## 2023-06-05 DIAGNOSIS — E78.2 MIXED HYPERLIPIDEMIA: ICD-10-CM

## 2023-06-05 DIAGNOSIS — M54.12 CERVICAL RADICULOPATHY: ICD-10-CM

## 2023-06-05 PROCEDURE — 99213 OFFICE O/P EST LOW 20 MIN: CPT | Performed by: FAMILY MEDICINE

## 2023-06-05 PROCEDURE — 3052F HG A1C>EQUAL 8.0%<EQUAL 9.0%: CPT | Performed by: FAMILY MEDICINE

## 2023-06-05 RX ORDER — SEMAGLUTIDE 2.68 MG/ML
INJECTION, SOLUTION SUBCUTANEOUS
Qty: 9 ML | Refills: 0 | Status: SHIPPED | OUTPATIENT
Start: 2023-06-05

## 2023-06-05 RX ORDER — GABAPENTIN 400 MG/1
CAPSULE ORAL
Qty: 90 CAPSULE | Refills: 0 | Status: SHIPPED | OUTPATIENT
Start: 2023-06-05 | End: 2023-10-02

## 2023-06-05 RX ORDER — ATORVASTATIN CALCIUM 20 MG/1
20 TABLET, FILM COATED ORAL DAILY
Qty: 90 TABLET | Refills: 1 | Status: SHIPPED | OUTPATIENT
Start: 2023-06-05

## 2023-06-05 SDOH — ECONOMIC STABILITY: FOOD INSECURITY: WITHIN THE PAST 12 MONTHS, THE FOOD YOU BOUGHT JUST DIDN'T LAST AND YOU DIDN'T HAVE MONEY TO GET MORE.: NEVER TRUE

## 2023-06-05 SDOH — ECONOMIC STABILITY: INCOME INSECURITY: HOW HARD IS IT FOR YOU TO PAY FOR THE VERY BASICS LIKE FOOD, HOUSING, MEDICAL CARE, AND HEATING?: NOT HARD AT ALL

## 2023-06-05 SDOH — ECONOMIC STABILITY: FOOD INSECURITY: WITHIN THE PAST 12 MONTHS, YOU WORRIED THAT YOUR FOOD WOULD RUN OUT BEFORE YOU GOT MONEY TO BUY MORE.: NEVER TRUE

## 2023-06-05 SDOH — ECONOMIC STABILITY: HOUSING INSECURITY
IN THE LAST 12 MONTHS, WAS THERE A TIME WHEN YOU DID NOT HAVE A STEADY PLACE TO SLEEP OR SLEPT IN A SHELTER (INCLUDING NOW)?: YES

## 2023-06-05 ASSESSMENT — ENCOUNTER SYMPTOMS
DIARRHEA: 0
COLOR CHANGE: 0
SHORTNESS OF BREATH: 0
RHINORRHEA: 1
VOMITING: 0
COUGH: 0
NAUSEA: 0
ABDOMINAL PAIN: 0
CHEST TIGHTNESS: 0

## 2023-06-05 NOTE — PROGRESS NOTES
[address] [facility]  [address] [address] [place] [address]  Dept: [phone]  Dept Fax: [phone]  Loc: [phone]      Subjective:     Chief Complaint   Patient presents with    Follow-up     4 months        HPI:  Blair Dickerson is a 36 y.o. male presents today for his 4 month follow-up visit. BP is back to normal.   No new problems voiced. Chronic meds reviewed. Recent blood work results reviewed. A1c is still high. 8.2%. He lost a few lbs on Ozempic. His cholesterol is high. Unfortunately, pt states he could not give up eating all the unhealthy foods he is eating. He requests a refill for 3 months. ROS:   Review of Systems   Constitutional:  Negative for activity change and fever. HENT:  Positive for congestion, postnasal drip and rhinorrhea. Drooling: nasal.   Respiratory:  Negative for cough, chest tightness and shortness of breath. Cardiovascular:  Negative for chest pain. Gastrointestinal:  Negative for abdominal pain, diarrhea, nausea and vomiting. Genitourinary:  Negative for frequency (improved after stopping  Jardiance) and urgency. Musculoskeletal:  Negative for arthralgias and myalgias. Skin:  Negative for color change. Neurological:  Negative for dizziness, weakness and numbness. Psychiatric/Behavioral:  Negative for agitation. The patient is not nervous/anxious.       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

## 2023-09-04 DIAGNOSIS — Z76.0 MEDICATION REFILL: ICD-10-CM

## 2023-09-04 DIAGNOSIS — E11.42 TYPE 2 DIABETES MELLITUS WITH DIABETIC POLYNEUROPATHY, WITHOUT LONG-TERM CURRENT USE OF INSULIN (HCC): ICD-10-CM

## 2023-09-07 RX ORDER — SEMAGLUTIDE 2.68 MG/ML
INJECTION, SOLUTION SUBCUTANEOUS
Qty: 3 ML | Refills: 1 | Status: SHIPPED | OUTPATIENT
Start: 2023-09-07

## 2023-09-07 NOTE — TELEPHONE ENCOUNTER
Wu Aparicio called to request a refill on his medication.       Last office visit : 6/5/2023   Next office visit : 9/18/2023     Requested Prescriptions     Pending Prescriptions Disp Refills    OZEMPIC, 2 MG/DOSE, 8 MG/3ML SOPN [Pharmacy Med Name: Niall Barber 8 MG/3 ML (2 MG/DOSE)]       Sig: INJECT 2MG INTO THE SKIN EVERY [address]

## 2023-09-09 DIAGNOSIS — M54.12 CERVICAL RADICULOPATHY: ICD-10-CM

## 2023-09-09 DIAGNOSIS — Z76.0 MEDICATION REFILL: ICD-10-CM

## 2023-09-09 DIAGNOSIS — E11.42 TYPE 2 DIABETES MELLITUS WITH DIABETIC POLYNEUROPATHY, WITHOUT LONG-TERM CURRENT USE OF INSULIN (HCC): ICD-10-CM

## 2023-09-11 RX ORDER — GABAPENTIN 400 MG/1
CAPSULE ORAL
Qty: 90 CAPSULE | Refills: 0 | Status: SHIPPED | OUTPATIENT
Start: 2023-09-11 | End: 2023-10-11

## 2023-09-11 NOTE — TELEPHONE ENCOUNTER
Leone Cowden called to request a refill on his medication. Last office visit : 6/5/2023   Next office visit : 9/18/2023     Last UDS: No results found for: \"LABAMPH\", \"LABBARB\", \"LABBENZ\", \"BUPRENUR\", \"COCAIMETSCRU\", \"GABAPENTIN\", \"MDMA\", \"METAMPU\", \"OPIATESCREENURINE\", \"OXTCOSU\", \"PHENCYCLIDINESCREENURINE\", \"PROPOXYPHENE\", \"THCSCREENUR\", \"TRICYUR\"    Last Walker Bernaltt: 09/11/2023  Medication Contract: Not found noted to get at appt scheduled on 09-  Last Fill:06-    Requested Prescriptions     Pending Prescriptions Disp Refills    gabapentin (NEURONTIN) 400 MG capsule [Pharmacy Med Name: Gabapentin 400 MG Oral Capsule] 90 capsule 0     Sig: TAKE 1 CAPSULE BY MOUTH THREE TIMES DAILY         Please approve or refuse this medication.    Jasmyn Dang MA

## 2023-09-18 ENCOUNTER — OFFICE VISIT (OUTPATIENT)
Dept: PRIMARY CARE CLINIC | Age: 43
End: 2023-09-18
Payer: COMMERCIAL

## 2023-09-18 VITALS
WEIGHT: 315 LBS | OXYGEN SATURATION: 98 % | HEART RATE: 96 BPM | HEIGHT: 73 IN | TEMPERATURE: 96.8 F | DIASTOLIC BLOOD PRESSURE: 90 MMHG | SYSTOLIC BLOOD PRESSURE: 144 MMHG | BODY MASS INDEX: 41.75 KG/M2

## 2023-09-18 DIAGNOSIS — Z11.59 NEED FOR HEPATITIS C SCREENING TEST: ICD-10-CM

## 2023-09-18 DIAGNOSIS — Z72.89 OTHER PROBLEMS RELATED TO LIFESTYLE: ICD-10-CM

## 2023-09-18 DIAGNOSIS — Z12.5 SCREENING FOR PROSTATE CANCER: ICD-10-CM

## 2023-09-18 DIAGNOSIS — I10 PRIMARY HYPERTENSION: ICD-10-CM

## 2023-09-18 DIAGNOSIS — Z51.81 MEDICATION MONITORING ENCOUNTER: ICD-10-CM

## 2023-09-18 DIAGNOSIS — E11.42 TYPE 2 DIABETES MELLITUS WITH DIABETIC POLYNEUROPATHY, WITHOUT LONG-TERM CURRENT USE OF INSULIN (HCC): ICD-10-CM

## 2023-09-18 DIAGNOSIS — Z76.0 MEDICATION REFILL: ICD-10-CM

## 2023-09-18 DIAGNOSIS — Z79.899 DRUG THERAPY: ICD-10-CM

## 2023-09-18 DIAGNOSIS — Z00.00 ANNUAL PHYSICAL EXAM: Primary | ICD-10-CM

## 2023-09-18 LAB
ALBUMIN SERPL-MCNC: 4.7 G/DL (ref 3.5–5.2)
ALCOHOL URINE: NORMAL
ALP SERPL-CCNC: 71 U/L (ref 40–130)
ALT SERPL-CCNC: 65 U/L (ref 5–41)
AMPHETAMINE SCREEN, URINE: NORMAL
ANION GAP SERPL CALCULATED.3IONS-SCNC: 16 MMOL/L (ref 7–19)
AST SERPL-CCNC: 51 U/L (ref 5–40)
BARBITURATE SCREEN, URINE: NORMAL
BASOPHILS # BLD: 0 K/UL (ref 0–0.2)
BASOPHILS NFR BLD: 0.3 % (ref 0–1)
BENZODIAZEPINE SCREEN, URINE: NORMAL
BILIRUB SERPL-MCNC: 0.3 MG/DL (ref 0.2–1.2)
BUN SERPL-MCNC: 13 MG/DL (ref 6–20)
BUPRENORPHINE URINE: NORMAL
CALCIUM SERPL-MCNC: 9.8 MG/DL (ref 8.6–10)
CHLORIDE SERPL-SCNC: 104 MMOL/L (ref 98–111)
CHOLEST SERPL-MCNC: 158 MG/DL (ref 160–199)
CO2 SERPL-SCNC: 22 MMOL/L (ref 22–29)
COCAINE METABOLITE SCREEN URINE: NORMAL
CREAT SERPL-MCNC: 0.7 MG/DL (ref 0.5–1.2)
EOSINOPHIL # BLD: 0.4 K/UL (ref 0–0.6)
EOSINOPHIL NFR BLD: 5.1 % (ref 0–5)
ERYTHROCYTE [DISTWIDTH] IN BLOOD BY AUTOMATED COUNT: 12.7 % (ref 11.5–14.5)
FENTANYL SCREEN, URINE: NORMAL
GABAPENTIN SCREEN, URINE: NORMAL
GLUCOSE SERPL-MCNC: 229 MG/DL (ref 74–109)
HBA1C MFR BLD: 8.4 % (ref 4–6)
HCT VFR BLD AUTO: 45.2 % (ref 42–52)
HCV AB SERPL QL IA: NORMAL
HDLC SERPL-MCNC: 42 MG/DL (ref 55–121)
HGB BLD-MCNC: 14.7 G/DL (ref 14–18)
IMM GRANULOCYTES # BLD: 0 K/UL
LDLC SERPL CALC-MCNC: 98 MG/DL
LYMPHOCYTES # BLD: 2.2 K/UL (ref 1.1–4.5)
LYMPHOCYTES NFR BLD: 30.7 % (ref 20–40)
MCH RBC QN AUTO: 29.5 PG (ref 27–31)
MCHC RBC AUTO-ENTMCNC: 32.5 G/DL (ref 33–37)
MCV RBC AUTO: 90.6 FL (ref 80–94)
MDMA URINE: NORMAL
METHADONE SCREEN, URINE: NORMAL
METHAMPHETAMINE, URINE: NORMAL
MONOCYTES # BLD: 0.4 K/UL (ref 0–0.9)
MONOCYTES NFR BLD: 5.9 % (ref 0–10)
NEUTROPHILS # BLD: 4.2 K/UL (ref 1.5–7.5)
NEUTS SEG NFR BLD: 57.7 % (ref 50–65)
OPIATE SCREEN URINE: NORMAL
OXYCODONE SCREEN URINE: NORMAL
PHENCYCLIDINE SCREEN URINE: NORMAL
PLATELET # BLD AUTO: 278 K/UL (ref 130–400)
PMV BLD AUTO: 9.7 FL (ref 9.4–12.4)
POTASSIUM SERPL-SCNC: 4.4 MMOL/L (ref 3.5–5)
PROPOXYPHENE SCREEN, URINE: NORMAL
PROT SERPL-MCNC: 8.1 G/DL (ref 6.6–8.7)
PSA SERPL-MCNC: 0.42 NG/ML (ref 0–4)
RBC # BLD AUTO: 4.99 M/UL (ref 4.7–6.1)
SODIUM SERPL-SCNC: 142 MMOL/L (ref 136–145)
SYNTHETIC CANNABINOIDS(K2) SCREEN, URINE: NORMAL
THC SCREEN, URINE: NORMAL
TRAMADOL SCREEN URINE: NORMAL
TRICYCLIC ANTIDEPRESSANTS, UR: NORMAL
TRIGL SERPL-MCNC: 92 MG/DL (ref 0–149)
WBC # BLD AUTO: 7.3 K/UL (ref 4.8–10.8)

## 2023-09-18 PROCEDURE — 90471 IMMUNIZATION ADMIN: CPT | Performed by: FAMILY MEDICINE

## 2023-09-18 PROCEDURE — 3080F DIAST BP >= 90 MM HG: CPT | Performed by: FAMILY MEDICINE

## 2023-09-18 PROCEDURE — 90674 CCIIV4 VAC NO PRSV 0.5 ML IM: CPT | Performed by: FAMILY MEDICINE

## 2023-09-18 PROCEDURE — 99396 PREV VISIT EST AGE 40-64: CPT | Performed by: FAMILY MEDICINE

## 2023-09-18 PROCEDURE — 3077F SYST BP >= 140 MM HG: CPT | Performed by: FAMILY MEDICINE

## 2023-09-18 RX ORDER — LISINOPRIL 5 MG/1
5 TABLET ORAL DAILY
Qty: 30 TABLET | Refills: 2 | Status: SHIPPED | OUTPATIENT
Start: 2023-09-18

## 2023-09-18 RX ORDER — SEMAGLUTIDE 2.68 MG/ML
INJECTION, SOLUTION SUBCUTANEOUS
Qty: 3 ML | Refills: 2 | Status: SHIPPED | OUTPATIENT
Start: 2023-09-18

## 2023-09-18 ASSESSMENT — ENCOUNTER SYMPTOMS
VOMITING: 0
DIARRHEA: 0
COUGH: 0
SHORTNESS OF BREATH: 0
NAUSEA: 0
ABDOMINAL PAIN: 0
CHEST TIGHTNESS: 0
COLOR CHANGE: 0

## 2023-09-18 NOTE — PATIENT INSTRUCTIONS
juices, including sauerkraut and pickled vegetables Frozen vegetables with sauces Commercially prepared potato and vegetable mixes   Fruits   Most fresh, frozen, and canned fruits All fruit juices   Fruits processed with salt or sodium   Milk   Nonfat or low-fat (1%) milk Nonfat or low-fat yogurt Cottage cheese, low-fat ricotta, cheeses labeled as low-fat and low-sodium   Whole milk Reduced-fat (2%) milk Malted and chocolate milk Full fat yogurt Most cheeses (unless low-fat and low salt) Buttermilk (no more than 1 cup per week)   Meats and Beans   Lean cuts of fresh or frozen beef, veal, lamb, or pork (look for the word loin) Fresh or frozen poultry without the skin Fresh or frozen fish and some shellfish Egg whites and egg substitutes (Limit whole eggs to three per week) Tofu Nuts or seeds (unsalted, dry-roasted), low-sodium peanut butter Dried peas, beans, and lentils   Any smoked, cured, salted, or canned meat, fish, or poultry (including koch, chipped beef, cold cuts, hot dogs, sausages, sardines, and anchovies) Poultry skins Breaded and/or fried fish or meats Canned peas, beans, and lentils Salted nuts   Fats and Oils   Olive oil and canola oil Low-sodium, low-fat salad dressings and mayonnaise   Butter, margarine, coconut and palm oils, koch fat   Snacks, Sweets, and Condiments   Low-sodium or unsalted versions of broths, soups, soy sauce, and condiments Pepper, herbs, and spices; vinegar, lemon, or lime juice Low-fat frozen desserts (yogurt, sherbet, fruit bars) Sugar, cocoa powder, honey, syrup, jam, and preserves Low-fat, trans-fat free cookies, cakes, and pies Shayan and animal crackers, fig bars, kate snaps   High-fat desserts Broth, soups, gravies, and sauces, made from instant mixes or other high-sodium ingredients Salted snack foods Canned olives Meat tenderizers, seasoning salt, and most flavored vinegars   Beverages   Low-sodium carbonated beverages Tea and coffee in moderation Soy milk

## 2023-09-18 NOTE — PROGRESS NOTES
Well Adult Note  Name: Domo Ballard Date: 2023   MRN: [id number] Sex: Male   Age: 39 y.o. Ethnicity: Non- / Non    : 1980 Race: White (non-)      Ramesh Fournier is here for well adult exam.  History:presents today for his annual physical and routine preventative visit. PMHx reviewed. No significant change. Family and social history also reviewed. No recent ER or UC visit. No recent hospitalization. Chronic medications reviewed, updated and reconciled  Pt does not smoke, drink ETOH or use recreational drugs. He  is up to date on his  immunizations. He  is due for screening preventative labs       Review of Systems   Constitutional:  Negative for activity change and fever. HENT: Negative. Drooling: nasal.    Respiratory:  Negative for cough, chest tightness and shortness of breath. Cardiovascular:  Negative for chest pain. Gastrointestinal:  Negative for abdominal pain, diarrhea, nausea and vomiting. Genitourinary:  Negative for difficulty urinating, dysuria, frequency and urgency. Musculoskeletal:  Negative for arthralgias and myalgias. Skin:  Negative for color change. Neurological:  Negative for dizziness, weakness and numbness. Psychiatric/Behavioral:  Negative for agitation. The patient is not nervous/anxious. No Known Allergies      Prior to Visit Medications    Medication Sig Taking?  Authorizing Provider   Semaglutide, 2 MG/DOSE, (OZEMPIC, 2 MG/DOSE,) 8 MG/3ML SOPN Use 2 mg once a week Yes Gunner Gaitan MD   lisinopril (PRINIVIL;ZESTRIL) 5 MG tablet Take 1 tablet by mouth daily Yes Gunner Gaitan MD   gabapentin (NEURONTIN) 400 MG capsule TAKE 1 CAPSULE BY MOUTH THREE TIMES DAILY Yes Shazia Prado MD   diclofenac (VOLTAREN) 50 MG EC tablet Take 1 tablet by mouth 3 times daily as needed for Pain Yes Gunner Gaitan MD   atorvastatin (LIPITOR) 20 MG tablet Take 1 tablet by mouth daily Yes Gunner Gaitan MD   [facility]

## 2023-09-18 NOTE — PROGRESS NOTES
After obtaining consent, and per orders of Dr. Sushila Anton, injection of flucelvax given in Left deltoid by Farida Chahal MA. Patient instructed to remain in clinic for 20 minutes afterwards, and to report any adverse reaction to me immediately.

## 2023-10-01 DIAGNOSIS — Z76.0 MEDICATION REFILL: ICD-10-CM

## 2023-10-02 RX ORDER — SILDENAFIL 100 MG/1
100 TABLET, FILM COATED ORAL PRN
Qty: 8 TABLET | Refills: 0 | Status: SHIPPED | OUTPATIENT
Start: 2023-10-02

## 2023-10-02 NOTE — TELEPHONE ENCOUNTER
Claudia Garcia called to request a refill on his medication.       Last office visit : 9/18/2023   Next office visit : 12/11/2023     Requested Prescriptions     Pending Prescriptions Disp Refills    sildenafil (VIAGRA) 100 MG tablet [Pharmacy Med Name: Sildenafil Citrate 100 MG Oral Tablet] 8 tablet 0     Sig: TAKE 1 TABLET BY MOUTH AS NEEDED FOR ERECTILE DYSFUNCTION            Bonnie Fink MA

## 2023-11-09 RX ORDER — CETIRIZINE HYDROCHLORIDE 10 MG/1
10 TABLET ORAL
Qty: 90 TABLET | Refills: 0 | Status: SHIPPED | OUTPATIENT
Start: 2023-11-09

## 2023-11-09 NOTE — TELEPHONE ENCOUNTER
Bobbi Gill called to request a refill on his medication.       Last office visit : 9/18/2023   Next office visit : 12/11/2023     Requested Prescriptions     Pending Prescriptions Disp Refills    cetirizine (ZYRTEC) 10 MG tablet [Pharmacy Med Name: CETIRIZINE HCL 10 MG TABLET] 90 tablet 0     Sig: TAKE 1 TABLET BY MOUTH NIGHTLY            Latonya Arvizu LPN

## 2023-11-30 DIAGNOSIS — E78.2 MIXED HYPERLIPIDEMIA: ICD-10-CM

## 2023-11-30 RX ORDER — ATORVASTATIN CALCIUM 20 MG/1
20 TABLET, FILM COATED ORAL DAILY
Qty: 90 TABLET | Refills: 1 | Status: SHIPPED | OUTPATIENT
Start: 2023-11-30

## 2023-11-30 NOTE — TELEPHONE ENCOUNTER
Patricia Danyky called to request a refill on his medication.       Last office visit : 9/18/2023   Next office visit : 12/11/2023     Requested Prescriptions     Pending Prescriptions Disp Refills    atorvastatin (LIPITOR) 20 MG tablet [Pharmacy Med Name: ATORVASTATIN 20 MG TABLET] 90 tablet 1     Sig: TAKE 1 TABLET BY MOUTH EVERY DAY            Lazara Mcmahon LPN

## 2023-12-02 DIAGNOSIS — Z76.0 MEDICATION REFILL: ICD-10-CM

## 2023-12-02 DIAGNOSIS — E11.42 TYPE 2 DIABETES MELLITUS WITH DIABETIC POLYNEUROPATHY, WITHOUT LONG-TERM CURRENT USE OF INSULIN (HCC): ICD-10-CM

## 2023-12-04 RX ORDER — SEMAGLUTIDE 2.68 MG/ML
INJECTION, SOLUTION SUBCUTANEOUS
Qty: 3 ML | Refills: 1 | Status: SHIPPED | OUTPATIENT
Start: 2023-12-04

## 2023-12-04 NOTE — TELEPHONE ENCOUNTER
Letitia Dany called to request a refill on his medication.       Last office visit : 9/18/2023   Next office visit : 12/11/2023     Requested Prescriptions     Pending Prescriptions Disp Refills    OZEMPIC, 2 MG/DOSE, 8 MG/3ML SOPN [Pharmacy Med Name: Noman [place] 8 MG/3 ML (2 MG/DOSE)]       Sig: USE 2 MG ONCE A WEEK            Jocelin Cheng LPN

## 2023-12-11 ENCOUNTER — OFFICE VISIT (OUTPATIENT)
Dept: PRIMARY CARE CLINIC | Age: 43
End: 2023-12-11
Payer: COMMERCIAL

## 2023-12-11 VITALS
HEIGHT: 73 IN | HEART RATE: 96 BPM | TEMPERATURE: 96.8 F | WEIGHT: 315 LBS | OXYGEN SATURATION: 97 % | SYSTOLIC BLOOD PRESSURE: 130 MMHG | BODY MASS INDEX: 41.75 KG/M2 | DIASTOLIC BLOOD PRESSURE: 84 MMHG

## 2023-12-11 DIAGNOSIS — Z91.199 PERSONAL HISTORY OF NONCOMPLIANCE WITH MEDICAL TREATMENT AND REGIMEN: ICD-10-CM

## 2023-12-11 DIAGNOSIS — Z76.0 MEDICATION REFILL: ICD-10-CM

## 2023-12-11 DIAGNOSIS — M54.12 CERVICAL RADICULOPATHY: ICD-10-CM

## 2023-12-11 DIAGNOSIS — E11.42 TYPE 2 DIABETES MELLITUS WITH DIABETIC POLYNEUROPATHY, WITHOUT LONG-TERM CURRENT USE OF INSULIN (HCC): Primary | ICD-10-CM

## 2023-12-11 DIAGNOSIS — E11.42 TYPE 2 DIABETES MELLITUS WITH DIABETIC POLYNEUROPATHY, WITHOUT LONG-TERM CURRENT USE OF INSULIN (HCC): ICD-10-CM

## 2023-12-11 DIAGNOSIS — J40 BRONCHITIS: ICD-10-CM

## 2023-12-11 LAB — HBA1C MFR BLD: 8.8 %

## 2023-12-11 PROCEDURE — 3052F HG A1C>EQUAL 8.0%<EQUAL 9.0%: CPT | Performed by: FAMILY MEDICINE

## 2023-12-11 PROCEDURE — 99214 OFFICE O/P EST MOD 30 MIN: CPT | Performed by: FAMILY MEDICINE

## 2023-12-11 RX ORDER — AZITHROMYCIN 250 MG/1
250 TABLET, FILM COATED ORAL SEE ADMIN INSTRUCTIONS
Qty: 6 TABLET | Refills: 0 | Status: SHIPPED | OUTPATIENT
Start: 2023-12-11 | End: 2023-12-16

## 2023-12-11 RX ORDER — BENZONATATE 100 MG/1
100 CAPSULE ORAL 3 TIMES DAILY PRN
Qty: 14 CAPSULE | Refills: 0 | Status: SHIPPED | OUTPATIENT
Start: 2023-12-11 | End: 2023-12-18

## 2023-12-11 RX ORDER — SILDENAFIL 100 MG/1
100 TABLET, FILM COATED ORAL PRN
Qty: 8 TABLET | Refills: 0 | Status: SHIPPED | OUTPATIENT
Start: 2023-12-11

## 2023-12-11 RX ORDER — GABAPENTIN 400 MG/1
400 CAPSULE ORAL 3 TIMES DAILY
Qty: 90 CAPSULE | Refills: 0 | Status: SHIPPED | OUTPATIENT
Start: 2023-12-11 | End: 2024-01-10

## 2023-12-11 RX ORDER — SEMAGLUTIDE 2.68 MG/ML
INJECTION, SOLUTION SUBCUTANEOUS
Qty: 9 ML | Refills: 1 | Status: SHIPPED | OUTPATIENT
Start: 2023-12-11

## 2023-12-11 ASSESSMENT — ENCOUNTER SYMPTOMS
VOMITING: 0
CHEST TIGHTNESS: 0
COUGH: 1
COLOR CHANGE: 0
SHORTNESS OF BREATH: 0
ABDOMINAL PAIN: 0
RHINORRHEA: 1
NAUSEA: 0
DIARRHEA: 0

## 2023-12-11 NOTE — PROGRESS NOTES
[facility]  [facility],[address]  [address] [address]  Dept: [phone]  Dept Fax: [phone]  Loc: [phone]      Subjective:     Chief Complaint   Patient presents with    Cough    Congestion    Follow-up       HPI:  Cortney Clayton is a 39 y.o. male presents today for evaluation of cough and congestion x 2-3 weeks duration  He restarted his allergy medication. He states he usually get like this around this time of the year. Now, he presents with greenish nasal discharge and his phlegm is also colored now as well. No reported fever. A1c today is 8.8.%. He admits that he had been non compliant with diet because he loves pies nya pecan pies    ROS:   Review of Systems   Constitutional:  Negative for activity change and fever. HENT:  Positive for congestion, postnasal drip and rhinorrhea. Respiratory:  Positive for cough. Negative for chest tightness and shortness of breath. Cardiovascular:  Negative for chest pain. Gastrointestinal:  Negative for abdominal pain, diarrhea, nausea and vomiting. Genitourinary:  Negative for frequency (improved after stopping  Jardiance) and urgency. Musculoskeletal:  Negative for arthralgias and myalgias. Skin:  Negative for color change. Allergic/Immunologic: Positive for environmental allergies. Neurological:  Negative for dizziness, weakness and numbness. Psychiatric/Behavioral:  Negative for agitation. The patient is not nervous/anxious.         PMHx:  Past Medical History:   Diagnosis Date    Cigarette nicotine dependence with nicotine-induced disorder 8/31/2020    Degenerative disc disease at L5-S1 level     Hyperlipidemia     Nerve pain     Perianal abscess 8/31/2020    Sepsis ([address]) 8/31/2020    Tobacco use disorder 9/19/2016    Type 2 diabetes mellitus without complication ([address])     Wound dehiscence, surgical 7/7/2016     Patient Active Problem List   Diagnosis    Neuropathy of peroneal nerve

## 2023-12-11 NOTE — TELEPHONE ENCOUNTER
Toshia Woods called to request a refill on his medication. Last office visit : 12/11/2023   Next office visit : 2/5/2024     Last UDS:   Amphetamine Screen, Urine   Date Value Ref Range Status   09/18/2023 neg  Final     Barbiturate Screen, Urine   Date Value Ref Range Status   09/18/2023 neg  Final     Benzodiazepine Screen, Urine   Date Value Ref Range Status   09/18/2023 neg  Final     Buprenorphine Urine   Date Value Ref Range Status   09/18/2023 neg  Final     Cocaine Metabolite Screen, Urine   Date Value Ref Range Status   09/18/2023 neg  Final     Gabapentin Screen, Urine   Date Value Ref Range Status   09/18/2023 neg  Final     MDMA, Urine   Date Value Ref Range Status   09/18/2023 neg  Final     Methamphetamine, Urine   Date Value Ref Range Status   09/18/2023 neg  Final     Opiate Scrn, Ur   Date Value Ref Range Status   09/18/2023 neg  Final     Oxycodone Screen, Ur   Date Value Ref Range Status   09/18/2023 neg  Final     PCP Screen, Urine   Date Value Ref Range Status   09/18/2023 neg  Final     Propoxyphene Screen, Urine   Date Value Ref Range Status   09/18/2023 neg  Final     THC Screen, Urine   Date Value Ref Range Status   09/18/2023 neg  Final     Tricyclic Antidepressants, Urine   Date Value Ref Range Status   09/18/2023 neg  Final       Last Irl Sole: 12-  Medication Contract: 09-  Last Fill: 09/11/2023  Requested Prescriptions     Pending Prescriptions Disp Refills    gabapentin (NEURONTIN) 400 MG capsule 90 capsule 0     Sig: Take 1 capsule by mouth 3 times daily for 30 days. sildenafil (VIAGRA) 100 MG tablet 8 tablet 0     Sig: Take 1 tablet by mouth as needed for Erectile Dysfunction         Please approve or refuse this medication.    Stefan Ta MA

## 2024-01-15 RX ORDER — LISINOPRIL 5 MG/1
5 TABLET ORAL DAILY
Qty: 30 TABLET | Refills: 1 | Status: SHIPPED | OUTPATIENT
Start: 2024-01-15

## 2024-01-15 NOTE — TELEPHONE ENCOUNTER
Pérez Helton called to request a refill on his medication.      Last office visit : 12/11/2023   Next office visit : 2/5/2024     Requested Prescriptions     Pending Prescriptions Disp Refills    lisinopril (PRINIVIL;ZESTRIL) 5 MG tablet 30 tablet 1     Sig: Take 1 tablet by mouth daily            Kiersten Powell MA

## 2024-02-03 DIAGNOSIS — M54.12 CERVICAL RADICULOPATHY: ICD-10-CM

## 2024-02-03 DIAGNOSIS — E11.42 TYPE 2 DIABETES MELLITUS WITH DIABETIC POLYNEUROPATHY, WITHOUT LONG-TERM CURRENT USE OF INSULIN (HCC): ICD-10-CM

## 2024-02-03 DIAGNOSIS — Z76.0 MEDICATION REFILL: ICD-10-CM

## 2024-02-05 ENCOUNTER — OFFICE VISIT (OUTPATIENT)
Dept: PRIMARY CARE CLINIC | Age: 44
End: 2024-02-05
Payer: COMMERCIAL

## 2024-02-05 VITALS
DIASTOLIC BLOOD PRESSURE: 88 MMHG | WEIGHT: 315 LBS | SYSTOLIC BLOOD PRESSURE: 136 MMHG | TEMPERATURE: 97.8 F | BODY MASS INDEX: 41.75 KG/M2 | HEART RATE: 94 BPM | OXYGEN SATURATION: 98 % | HEIGHT: 73 IN

## 2024-02-05 DIAGNOSIS — R53.83 OTHER FATIGUE: ICD-10-CM

## 2024-02-05 DIAGNOSIS — N52.9 ERECTILE DYSFUNCTION, UNSPECIFIED ERECTILE DYSFUNCTION TYPE: ICD-10-CM

## 2024-02-05 DIAGNOSIS — Z76.0 MEDICATION REFILL: ICD-10-CM

## 2024-02-05 DIAGNOSIS — R52 BODY ACHES: ICD-10-CM

## 2024-02-05 DIAGNOSIS — E11.65 UNCONTROLLED TYPE 2 DIABETES MELLITUS WITH HYPERGLYCEMIA (HCC): Primary | ICD-10-CM

## 2024-02-05 DIAGNOSIS — E66.01 MORBID OBESITY WITH BMI OF 50.0-59.9, ADULT (HCC): ICD-10-CM

## 2024-02-05 DIAGNOSIS — E78.5 HYPERLIPIDEMIA ASSOCIATED WITH TYPE 2 DIABETES MELLITUS (HCC): ICD-10-CM

## 2024-02-05 DIAGNOSIS — E11.69 HYPERLIPIDEMIA ASSOCIATED WITH TYPE 2 DIABETES MELLITUS (HCC): ICD-10-CM

## 2024-02-05 LAB
CHP ED QC CHECK: NORMAL
GLUCOSE BLD-MCNC: 211 MG/DL
INFLUENZA A ANTIGEN, POC: NEGATIVE
INFLUENZA B ANTIGEN, POC: NEGATIVE
LOT EXPIRE DATE: NORMAL
LOT KIT NUMBER: NORMAL
SARS-COV-2, POC: NORMAL
VALID INTERNAL CONTROL: NORMAL
VENDOR AND KIT NAME POC: NORMAL

## 2024-02-05 PROCEDURE — 99214 OFFICE O/P EST MOD 30 MIN: CPT | Performed by: FAMILY MEDICINE

## 2024-02-05 RX ORDER — SILDENAFIL 100 MG/1
100 TABLET, FILM COATED ORAL PRN
Qty: 8 TABLET | Refills: 0 | OUTPATIENT
Start: 2024-02-05

## 2024-02-05 RX ORDER — GABAPENTIN 400 MG/1
400 CAPSULE ORAL 3 TIMES DAILY
Qty: 90 CAPSULE | Refills: 0 | OUTPATIENT
Start: 2024-02-05

## 2024-02-05 RX ORDER — SILDENAFIL 100 MG/1
100 TABLET, FILM COATED ORAL PRN
Qty: 8 TABLET | Refills: 0 | Status: SHIPPED | OUTPATIENT
Start: 2024-02-05

## 2024-02-05 ASSESSMENT — PATIENT HEALTH QUESTIONNAIRE - PHQ9
SUM OF ALL RESPONSES TO PHQ QUESTIONS 1-9: 0
2. FEELING DOWN, DEPRESSED OR HOPELESS: 0
1. LITTLE INTEREST OR PLEASURE IN DOING THINGS: 0
SUM OF ALL RESPONSES TO PHQ9 QUESTIONS 1 & 2: 0
SUM OF ALL RESPONSES TO PHQ QUESTIONS 1-9: 0

## 2024-02-05 ASSESSMENT — ENCOUNTER SYMPTOMS
DIARRHEA: 0
RESPIRATORY NEGATIVE: 1
NAUSEA: 0
COLOR CHANGE: 0
VOMITING: 0
ABDOMINAL PAIN: 0

## 2024-02-05 NOTE — PROGRESS NOTES
[facility]  [address]  [address]  [place] [address]  Dept: [phone]  Dept Fax: [phone]  Loc: [phone]      Subjective:     Chief Complaint   Patient presents with    Follow-up     Not feeling well       HPI:  Pérez Pinedo is a 43 y.o. male presents today for his routine follow-up but also states that he has not felt well in the last 2 days. - low grade fever, eyes burning, HA, body ache, fatigue. He states that he has been working 20 hours a day some days. He feels wore out  BP is well controlled today  He has not been checking his BS as well so he can't tell how his DM is now. His last A1c in Dec was 8.8. He is still on Metformin and Ozempic  RBS today 211 mg/dl    ROS:   Review of Systems   Constitutional:  Negative for activity change and fever.   HENT: Negative.     Respiratory: Negative.     Cardiovascular:  Negative for chest pain.   Gastrointestinal:  Negative for abdominal pain, diarrhea, nausea and vomiting.   Genitourinary:  Negative for frequency (improved after stopping  Jardiance) and urgency.        ED   Musculoskeletal:  Positive for myalgias. Negative for arthralgias.   Skin:  Negative for color change.   Allergic/Immunologic: Positive for environmental allergies.   Neurological:  Negative for dizziness, weakness and numbness.   Psychiatric/Behavioral:  Negative for agitation. The patient is not nervous/anxious.        PMHx:  Past Medical History:   Diagnosis Date    Cigarette nicotine dependence with nicotine-induced disorder 8/31/2020    Degenerative disc disease at L5-S1 level     Hyperlipidemia     Nerve pain     Perianal abscess 8/31/2020    Sepsis (HCC) 8/31/2020    Tobacco use disorder 9/19/2016    Type 2 diabetes mellitus without complication (HCC)     Wound dehiscence, surgical 7/7/2016     Patient Active Problem List   Diagnosis    Generalized anxiety disorder    Psychophysiological insomnia    Hyperlipidemia associated

## 2024-02-05 NOTE — TELEPHONE ENCOUNTER
Pérez Helton called to request a refill on his medication.      Last office visit : 12/11/2023   Next office visit : 2/5/2024     Last UDS:   Amphetamine Screen, Urine   Date Value Ref Range Status   09/18/2023 neg  Final     Barbiturate Screen, Urine   Date Value Ref Range Status   09/18/2023 neg  Final     Benzodiazepine Screen, Urine   Date Value Ref Range Status   09/18/2023 neg  Final     Buprenorphine Urine   Date Value Ref Range Status   09/18/2023 neg  Final     Cocaine Metabolite Screen, Urine   Date Value Ref Range Status   09/18/2023 neg  Final     Gabapentin Screen, Urine   Date Value Ref Range Status   09/18/2023 neg  Final     MDMA, Urine   Date Value Ref Range Status   09/18/2023 neg  Final     Methamphetamine, Urine   Date Value Ref Range Status   09/18/2023 neg  Final     Opiate Scrn, Ur   Date Value Ref Range Status   09/18/2023 neg  Final     Oxycodone Screen, Ur   Date Value Ref Range Status   09/18/2023 neg  Final     PCP Screen, Urine   Date Value Ref Range Status   09/18/2023 neg  Final     Propoxyphene Screen, Urine   Date Value Ref Range Status   09/18/2023 neg  Final     THC Screen, Urine   Date Value Ref Range Status   09/18/2023 neg  Final     Tricyclic Antidepressants, Urine   Date Value Ref Range Status   09/18/2023 neg  Final       Last Jhonatan: 12/11/23  Medication Contract: 9/18/23   Last Fill: 12/11/23    Requested Prescriptions     Pending Prescriptions Disp Refills    sildenafil (VIAGRA) 100 MG tablet [Pharmacy Med Name: Sildenafil Citrate 100 MG Oral Tablet] 8 tablet 0     Sig: TAKE 1 TABLET BY MOUTH AS NEEDED FOR ERECTILE DYSFUNCTION    gabapentin (NEURONTIN) 400 MG capsule [Pharmacy Med Name: Gabapentin 400 MG Oral Capsule] 90 capsule 0     Sig: Take 1 capsule by mouth 3 times daily.                       Please approve or refuse this medication.   Madison Luo LPN

## 2024-03-10 DIAGNOSIS — Z76.0 MEDICATION REFILL: ICD-10-CM

## 2024-03-10 DIAGNOSIS — M54.12 CERVICAL RADICULOPATHY: ICD-10-CM

## 2024-03-10 DIAGNOSIS — E11.42 TYPE 2 DIABETES MELLITUS WITH DIABETIC POLYNEUROPATHY, WITHOUT LONG-TERM CURRENT USE OF INSULIN (HCC): ICD-10-CM

## 2024-03-11 RX ORDER — GABAPENTIN 400 MG/1
400 CAPSULE ORAL 3 TIMES DAILY
Qty: 90 CAPSULE | Refills: 0 | Status: SHIPPED | OUTPATIENT
Start: 2024-03-11 | End: 2024-04-11

## 2024-03-11 RX ORDER — SILDENAFIL 100 MG/1
100 TABLET, FILM COATED ORAL PRN
Qty: 8 TABLET | Refills: 0 | Status: SHIPPED | OUTPATIENT
Start: 2024-03-11

## 2024-03-11 NOTE — TELEPHONE ENCOUNTER
Pérez Helton called to request a refill on his medication.      Last office visit : 2/5/2024   Next office visit : 5/13/2024     Last UDS:   Amphetamine Screen, Urine   Date Value Ref Range Status   09/18/2023 neg  Final     Barbiturate Screen, Urine   Date Value Ref Range Status   09/18/2023 neg  Final     Benzodiazepine Screen, Urine   Date Value Ref Range Status   09/18/2023 neg  Final     Buprenorphine Urine   Date Value Ref Range Status   09/18/2023 neg  Final     Cocaine Metabolite Screen, Urine   Date Value Ref Range Status   09/18/2023 neg  Final     Gabapentin Screen, Urine   Date Value Ref Range Status   09/18/2023 neg  Final     MDMA, Urine   Date Value Ref Range Status   09/18/2023 neg  Final     Methamphetamine, Urine   Date Value Ref Range Status   09/18/2023 neg  Final     Opiate Scrn, Ur   Date Value Ref Range Status   09/18/2023 neg  Final     Oxycodone Screen, Ur   Date Value Ref Range Status   09/18/2023 neg  Final     PCP Screen, Urine   Date Value Ref Range Status   09/18/2023 neg  Final     Propoxyphene Screen, Urine   Date Value Ref Range Status   09/18/2023 neg  Final     THC Screen, Urine   Date Value Ref Range Status   09/18/2023 neg  Final     Tricyclic Antidepressants, Urine   Date Value Ref Range Status   09/18/2023 neg  Final       Last Jhonatan: 03- under shad process will scan into chart once it goes through  Medication Contract: 09/18/2023  Last Fill: 12-    Requested Prescriptions     Pending Prescriptions Disp Refills    sildenafil (VIAGRA) 100 MG tablet [Pharmacy Med Name: Sildenafil Citrate 100 MG Oral Tablet] 8 tablet 0     Sig: TAKE 1 TABLET BY MOUTH AS NEEDED FOR ERECTILE DYSFUNCTION    gabapentin (NEURONTIN) 400 MG capsule [Pharmacy Med Name: Gabapentin 400 MG Oral Capsule] 90 capsule 0     Sig: Take 1 capsule by mouth 3 times daily.         Please approve or refuse this medication.   Kiersten Powell MA

## 2024-03-11 NOTE — TELEPHONE ENCOUNTER
Pérez Marniton called to request a refill on his medication.      Last office visit : 2/5/2024   Next office visit : 3/10/2024     Requested Prescriptions     Pending Prescriptions Disp Refills    metFORMIN (GLUCOPHAGE) 1000 MG tablet [Pharmacy Med Name: METFORMIN HCL 1,000 MG TABLET] 180 tablet 3     Sig: TAKE 1 TABLET BY MOUTH TWICE A DAY WITH MEALS    diclofenac (VOLTAREN) 50 MG EC tablet [Pharmacy Med Name: DICLOFENAC SOD EC 50 MG TAB] 90 tablet 3     Sig: TAKE 1 TABLET BY MOUTH THREE TIMES A DAY AS NEEDED FOR PAIN            Kiersten Powell MA

## 2024-05-18 DIAGNOSIS — J30.2 SEASONAL ALLERGIES: ICD-10-CM

## 2024-05-18 DIAGNOSIS — Z76.0 MEDICATION REFILL: ICD-10-CM

## 2024-05-19 DIAGNOSIS — Z76.0 MEDICATION REFILL: ICD-10-CM

## 2024-05-20 RX ORDER — LISINOPRIL 5 MG/1
5 TABLET ORAL DAILY
Qty: 30 TABLET | Refills: 1 | Status: SHIPPED | OUTPATIENT
Start: 2024-05-20

## 2024-05-20 RX ORDER — LORATADINE/PSEUDOEPHEDRINE 5 MG-120MG
TABLET, EXTENDED RELEASE 12 HR ORAL
Qty: 30 TABLET | Refills: 1 | Status: SHIPPED | OUTPATIENT
Start: 2024-05-20

## 2024-05-20 RX ORDER — SILDENAFIL 100 MG/1
100 TABLET, FILM COATED ORAL PRN
Qty: 8 TABLET | Refills: 0 | Status: SHIPPED | OUTPATIENT
Start: 2024-05-20

## 2024-05-20 NOTE — TELEPHONE ENCOUNTER
Pérez Helton called to request a refill on his medication.      Last office visit : 2/5/2024   Next office visit : 5/19/2024     Requested Prescriptions     Pending Prescriptions Disp Refills    lisinopril (PRINIVIL;ZESTRIL) 5 MG tablet [Pharmacy Med Name: LISINOPRIL 5 MG TABLET] 30 tablet 1     Sig: TAKE 1 TABLET BY MOUTH EVERY DAY    [facility] REL[facility]F-D[facility] 5-120 MG per extended release tablet [Pharmacy Med Name: [facility] RELIEF-D12 TABLET] 30 tablet 1     Sig: TAKE 1 TABLET BY MOUTH EVERY DAY            Kiersten Powell MA

## 2024-05-20 NOTE — TELEPHONE ENCOUNTER
Pérez Helton called to request a refill on his medication.      Last office visit : 2/5/2024   Next office visit : 6/3/2024     Requested Prescriptions     Pending Prescriptions Disp Refills    sildenafil (VIAGRA) 100 MG tablet [Pharmacy Med Name: Sildenafil Citrate 100 MG Oral Tablet] 8 tablet 0     Sig: TAKE 1 TABLET BY MOUTH AS NEEDED FOR ERECTILE DYSFUNCTION            Kiersten Powell MA

## 2024-05-27 DIAGNOSIS — E11.42 TYPE 2 DIABETES MELLITUS WITH DIABETIC POLYNEUROPATHY, WITHOUT LONG-TERM CURRENT USE OF INSULIN (HCC): ICD-10-CM

## 2024-05-27 DIAGNOSIS — Z76.0 MEDICATION REFILL: ICD-10-CM

## 2024-05-28 RX ORDER — SEMAGLUTIDE 2.68 MG/ML
INJECTION, SOLUTION SUBCUTANEOUS
Qty: 3 ML | Refills: 1 | Status: SHIPPED | OUTPATIENT
Start: 2024-05-28

## 2024-06-03 ENCOUNTER — OFFICE VISIT (OUTPATIENT)
Dept: PRIMARY CARE CLINIC | Age: 44
End: 2024-06-03
Payer: COMMERCIAL

## 2024-06-03 VITALS
BODY MASS INDEX: 41.75 KG/M2 | HEART RATE: 95 BPM | DIASTOLIC BLOOD PRESSURE: 86 MMHG | OXYGEN SATURATION: 96 % | SYSTOLIC BLOOD PRESSURE: 126 MMHG | HEIGHT: 73 IN | TEMPERATURE: 97.8 F | WEIGHT: 315 LBS

## 2024-06-03 DIAGNOSIS — E11.42 TYPE 2 DIABETES MELLITUS WITH DIABETIC POLYNEUROPATHY, WITHOUT LONG-TERM CURRENT USE OF INSULIN (HCC): ICD-10-CM

## 2024-06-03 DIAGNOSIS — E66.01 MORBID OBESITY WITH BMI OF 50.0-59.9, ADULT (HCC): ICD-10-CM

## 2024-06-03 DIAGNOSIS — R39.11 HESITANCY: ICD-10-CM

## 2024-06-03 DIAGNOSIS — I10 PRIMARY HYPERTENSION: ICD-10-CM

## 2024-06-03 DIAGNOSIS — E11.69 HYPERLIPIDEMIA ASSOCIATED WITH TYPE 2 DIABETES MELLITUS (HCC): ICD-10-CM

## 2024-06-03 DIAGNOSIS — E78.5 HYPERLIPIDEMIA ASSOCIATED WITH TYPE 2 DIABETES MELLITUS (HCC): ICD-10-CM

## 2024-06-03 DIAGNOSIS — R39.11 HESITANCY: Primary | ICD-10-CM

## 2024-06-03 LAB
APPEARANCE FLUID: ABNORMAL
BILIRUBIN, POC: NEGATIVE
BLOOD URINE, POC: ABNORMAL
CLARITY, POC: ABNORMAL
COLOR, POC: ABNORMAL
GLUCOSE URINE, POC: 250
HBA1C MFR BLD: 8.1 %
KETONES, POC: ABNORMAL
LEUKOCYTE EST, POC: NEGATIVE
NITRITE, POC: NEGATIVE
PH, POC: 6
PROTEIN, POC: ABNORMAL
PSA SERPL-MCNC: 0.44 NG/ML (ref 0–4)
SPECIFIC GRAVITY, POC: 1.03
UROBILINOGEN, POC: 0.2

## 2024-06-03 PROCEDURE — 3074F SYST BP LT 130 MM HG: CPT | Performed by: FAMILY MEDICINE

## 2024-06-03 PROCEDURE — 81002 URINALYSIS NONAUTO W/O SCOPE: CPT | Performed by: FAMILY MEDICINE

## 2024-06-03 PROCEDURE — 3079F DIAST BP 80-89 MM HG: CPT | Performed by: FAMILY MEDICINE

## 2024-06-03 PROCEDURE — 99214 OFFICE O/P EST MOD 30 MIN: CPT | Performed by: FAMILY MEDICINE

## 2024-06-03 PROCEDURE — 3052F HG A1C>EQUAL 8.0%<EQUAL 9.0%: CPT | Performed by: FAMILY MEDICINE

## 2024-06-03 PROCEDURE — 83036 HEMOGLOBIN GLYCOSYLATED A1C: CPT | Performed by: FAMILY MEDICINE

## 2024-06-03 RX ORDER — ORAL SEMAGLUTIDE 3 MG/1
1 TABLET ORAL DAILY
Qty: 30 TABLET | Refills: 0 | Status: SHIPPED | COMMUNITY
Start: 2024-06-03

## 2024-06-03 ASSESSMENT — ENCOUNTER SYMPTOMS
RHINORRHEA: 0
COLOR CHANGE: 0
VOMITING: 0
ABDOMINAL PAIN: 0
COUGH: 0
DIARRHEA: 0
NAUSEA: 0
CHEST TIGHTNESS: 0
SHORTNESS OF BREATH: 0

## 2024-06-03 NOTE — PROGRESS NOTES
2 diabetes mellitus with hyperglycemia (HCC)       PSHx:  Past Surgical History:   Procedure Laterality Date    INCISION AND DRAINAGE Right 7/7/2016    KNEE IRRIGATION AND DEBRIDEMENT  performed by Shantanu Villar MD at [facility] OR    NERVE SURGERY Right 5/31/2016    PERONEAL NERVE RELEASE ; RIGHT FIBULAR HEAD  performed by Shantanu Villar MD at [facility] OR       PFHx:  Family History   Problem Relation Age of Onset    Other Mother         blockage    High Blood Pressure Father     Diabetes Father        SocialHx:  Social History     Tobacco Use    Smoking status: Former     Current packs/day: 0.00     Average packs/day: 1 pack/day for 20.0 years (20.0 ttl pk-yrs)     Types: Cigarettes     Start date: 8/30/2000     Quit date: 8/30/2020     Years since quitting: 3.7    Smokeless tobacco: Never   Substance Use Topics    Alcohol use: Yes     Comment: occ       Allergies:  No Known Allergies    Medications:  Current Outpatient Medications   Medication Sig Dispense Refill    Semaglutide (RYBELSUS) 3 MG TABS Take 1 tablet by mouth daily 30 tablet 0    semaglutide, 2 MG/DOSE, (OZEMPIC, 2 MG/DOSE,) 8 MG/3ML SOPN sc injection INJECT 2 MG ONCE A WEEK AS DIRECTED 3 mL 1    lisinopril (PRINIVIL;ZESTRIL) 5 MG tablet TAKE 1 TABLET BY MOUTH EVERY DAY 30 tablet 1    [facility] REL[facility]F-D12 5-120 MG per extended release tablet TAKE 1 TABLET BY MOUTH EVERY DAY 30 tablet 1    sildenafil (VIAGRA) 100 MG tablet TAKE 1 TABLET BY MOUTH AS NEEDED FOR ERECTILE DYSFUNCTION 8 tablet 0    metFORMIN (GLUCOPHAGE) 1000 MG tablet TAKE 1 TABLET BY MOUTH TWICE A DAY WITH MEALS 180 tablet 3    diclofenac (VOLTAREN) 50 MG EC tablet TAKE 1 TABLET BY MOUTH THREE TIMES A DAY AS NEEDED FOR PAIN 90 tablet 3    gabapentin (NEURONTIN) 400 MG capsule Take 1 capsule by mouth 3 times daily for 31 days. Max Daily Amount: 1,200 mg 90 capsule 0    atorvastatin (LIPITOR) 20 MG tablet TAKE 1 TABLET BY MOUTH EVERY DAY 90 tablet 1    montelukast (SINGULAIR) 10 MG tablet Take

## 2024-06-12 RX ORDER — LISINOPRIL 5 MG/1
5 TABLET ORAL DAILY
Qty: 90 TABLET | Refills: 1 | Status: SHIPPED | OUTPATIENT
Start: 2024-06-12

## 2024-06-13 DIAGNOSIS — E11.42 TYPE 2 DIABETES MELLITUS WITH DIABETIC POLYNEUROPATHY, WITHOUT LONG-TERM CURRENT USE OF INSULIN (HCC): ICD-10-CM

## 2024-06-13 DIAGNOSIS — M54.12 CERVICAL RADICULOPATHY: ICD-10-CM

## 2024-06-13 DIAGNOSIS — Z76.0 MEDICATION REFILL: ICD-10-CM

## 2024-06-13 NOTE — TELEPHONE ENCOUNTER
Pérez Helton called to request a refill on his medication.      Last office visit : 6/3/2024   Next office visit : 9/9/2024     Last UDS:   Benzodiazepine Screen, Urine   Date Value Ref Range Status   09/18/2023 neg  Final     Buprenorphine Urine   Date Value Ref Range Status   09/18/2023 neg  Final     Cocaine Metabolite Screen, Urine   Date Value Ref Range Status   09/18/2023 neg  Final     Gabapentin Screen, Urine   Date Value Ref Range Status   09/18/2023 neg  Final     MDMA, Urine   Date Value Ref Range Status   09/18/2023 neg  Final     Oxycodone Screen, Ur   Date Value Ref Range Status   09/18/2023 neg  Final     Propoxyphene Screen, Urine   Date Value Ref Range Status   09/18/2023 neg  Final     THC Screen, Urine   Date Value Ref Range Status   09/18/2023 neg  Final     Tricyclic Antidepressants, Urine   Date Value Ref Range Status   09/18/2023 neg  Final       Last Jhonatan: Manual Process will scan once completed    Medication Contract: 09/18/2023  Last Fill: 03/11/2024    Requested Prescriptions     Pending Prescriptions Disp Refills    gabapentin (NEURONTIN) 400 MG capsule [Pharmacy Med Name: Gabapentin 400 MG Oral Capsule] 90 capsule 0     Sig: Take 1 capsule by mouth 3 times daily.         Please approve or refuse this medication.   Kiersten Powell MA

## 2024-06-16 RX ORDER — GABAPENTIN 400 MG/1
400 CAPSULE ORAL 3 TIMES DAILY
Qty: 90 CAPSULE | Refills: 0 | Status: SHIPPED | OUTPATIENT
Start: 2024-06-16 | End: 2024-07-16

## 2024-07-20 DIAGNOSIS — Z76.0 MEDICATION REFILL: ICD-10-CM

## 2024-07-22 RX ORDER — SILDENAFIL 100 MG/1
100 TABLET, FILM COATED ORAL PRN
Qty: 8 TABLET | Refills: 0 | Status: SHIPPED | OUTPATIENT
Start: 2024-07-22

## 2024-07-22 NOTE — TELEPHONE ENCOUNTER
Pérez Helton called to request a refill on his medication.      Last office visit : 6/3/2024   Next office visit : 9/9/2024     Requested Prescriptions     Pending Prescriptions Disp Refills    sildenafil (VIAGRA) 100 MG tablet [Pharmacy Med Name: Sildenafil Citrate 100 MG Oral Tablet] 8 tablet 0     Sig: TAKE 1 TABLET BY MOUTH AS NEEDED FOR ERECTILE DYSFUNCTION            Bobbi Lopez

## 2024-08-13 RX ORDER — CETIRIZINE HYDROCHLORIDE 10 MG/1
10 TABLET ORAL NIGHTLY
Qty: 90 TABLET | Refills: 0 | OUTPATIENT
Start: 2024-08-13

## 2024-09-06 DIAGNOSIS — Z76.0 MEDICATION REFILL: ICD-10-CM

## 2024-09-09 ENCOUNTER — OFFICE VISIT (OUTPATIENT)
Dept: PRIMARY CARE CLINIC | Age: 44
End: 2024-09-09

## 2024-09-09 VITALS
HEIGHT: 73 IN | WEIGHT: 315 LBS | SYSTOLIC BLOOD PRESSURE: 126 MMHG | HEART RATE: 95 BPM | BODY MASS INDEX: 41.75 KG/M2 | TEMPERATURE: 97.2 F | DIASTOLIC BLOOD PRESSURE: 86 MMHG | OXYGEN SATURATION: 98 %

## 2024-09-09 DIAGNOSIS — Z23 NEED FOR INFLUENZA VACCINATION: ICD-10-CM

## 2024-09-09 DIAGNOSIS — E11.42 TYPE 2 DIABETES MELLITUS WITH DIABETIC POLYNEUROPATHY, WITHOUT LONG-TERM CURRENT USE OF INSULIN (HCC): Primary | ICD-10-CM

## 2024-09-09 DIAGNOSIS — G89.29 CHRONIC BILATERAL LOW BACK PAIN WITHOUT SCIATICA: ICD-10-CM

## 2024-09-09 DIAGNOSIS — Z76.0 MEDICATION REFILL: ICD-10-CM

## 2024-09-09 DIAGNOSIS — M54.50 CHRONIC BILATERAL LOW BACK PAIN WITHOUT SCIATICA: ICD-10-CM

## 2024-09-09 LAB — HBA1C MFR BLD: 8.4 %

## 2024-09-09 RX ORDER — LISINOPRIL 5 MG/1
5 TABLET ORAL DAILY
Qty: 90 TABLET | Refills: 1 | Status: SHIPPED | OUTPATIENT
Start: 2024-09-09

## 2024-09-09 RX ORDER — SILDENAFIL 100 MG/1
100 TABLET, FILM COATED ORAL PRN
Qty: 8 TABLET | Refills: 0 | OUTPATIENT
Start: 2024-09-09

## 2024-09-09 RX ORDER — SEMAGLUTIDE 2.68 MG/ML
INJECTION, SOLUTION SUBCUTANEOUS
Qty: 3 ML | Refills: 1 | Status: SHIPPED | OUTPATIENT
Start: 2024-09-09

## 2024-09-09 SDOH — ECONOMIC STABILITY: FOOD INSECURITY: WITHIN THE PAST 12 MONTHS, THE FOOD YOU BOUGHT JUST DIDN'T LAST AND YOU DIDN'T HAVE MONEY TO GET MORE.: NEVER TRUE

## 2024-09-09 SDOH — ECONOMIC STABILITY: INCOME INSECURITY: HOW HARD IS IT FOR YOU TO PAY FOR THE VERY BASICS LIKE FOOD, HOUSING, MEDICAL CARE, AND HEATING?: NOT HARD AT ALL

## 2024-09-09 SDOH — ECONOMIC STABILITY: FOOD INSECURITY: WITHIN THE PAST 12 MONTHS, YOU WORRIED THAT YOUR FOOD WOULD RUN OUT BEFORE YOU GOT MONEY TO BUY MORE.: NEVER TRUE

## 2024-09-10 ASSESSMENT — ENCOUNTER SYMPTOMS
RHINORRHEA: 0
COUGH: 0
BACK PAIN: 1
NAUSEA: 0
ABDOMINAL PAIN: 0
SHORTNESS OF BREATH: 0
DIARRHEA: 0
VOMITING: 0
COLOR CHANGE: 0
CHEST TIGHTNESS: 0

## 2024-09-24 DIAGNOSIS — Z76.0 MEDICATION REFILL: ICD-10-CM

## 2024-09-25 RX ORDER — SILDENAFIL 100 MG/1
100 TABLET, FILM COATED ORAL PRN
Qty: 8 TABLET | Refills: 0 | Status: SHIPPED | OUTPATIENT
Start: 2024-09-25

## 2024-10-11 DIAGNOSIS — Z76.0 MEDICATION REFILL: ICD-10-CM

## 2024-10-11 DIAGNOSIS — M54.12 CERVICAL RADICULOPATHY: ICD-10-CM

## 2024-10-11 DIAGNOSIS — E11.42 TYPE 2 DIABETES MELLITUS WITH DIABETIC POLYNEUROPATHY, WITHOUT LONG-TERM CURRENT USE OF INSULIN (HCC): ICD-10-CM

## 2024-10-11 NOTE — TELEPHONE ENCOUNTER
Pérez Helton called to request a refill on his medication.    Jhonatan pending manual review  Last office visit : 9/9/2024   Next office visit : 12/9/2024     Last UDS:   Benzodiazepine Screen, Urine   Date Value Ref Range Status   09/18/2023 neg  Final     Buprenorphine Urine   Date Value Ref Range Status   09/18/2023 neg  Final     Cocaine Metabolite Screen, Urine   Date Value Ref Range Status   09/18/2023 neg  Final     Gabapentin Screen, Urine   Date Value Ref Range Status   09/18/2023 neg  Final     Oxycodone Screen, Ur   Date Value Ref Range Status   09/18/2023 neg  Final     Propoxyphene Screen, Urine   Date Value Ref Range Status   09/18/2023 neg  Final     THC Screen, Urine   Date Value Ref Range Status   09/18/2023 neg  Final     Tricyclic Antidepressants, Urine   Date Value Ref Range Status   09/18/2023 neg  Final       Last Jhonatan: 10/11/24  Medication Contract: 9/18/23   Last Fill: 6/16/24    Requested Prescriptions     Pending Prescriptions Disp Refills    gabapentin (NEURONTIN) 400 MG capsule [Pharmacy Med Name: Gabapentin 400 MG Oral Capsule] 90 capsule 0     Sig: TAKE 1 CAPSULE BY MOUTH THREE TIMES DAILY FOR 30 DAYS . DO NOT EXCEED  DAILY  AMOUNT  1200MG*         Please approve or refuse this medication.   Madison Luo LPN

## 2024-10-14 RX ORDER — GABAPENTIN 400 MG/1
CAPSULE ORAL
Qty: 90 CAPSULE | Refills: 0 | Status: SHIPPED | OUTPATIENT
Start: 2024-10-14 | End: 2024-11-14

## 2024-11-23 DIAGNOSIS — Z76.0 MEDICATION REFILL: ICD-10-CM

## 2024-11-23 DIAGNOSIS — E11.42 TYPE 2 DIABETES MELLITUS WITH DIABETIC POLYNEUROPATHY, WITHOUT LONG-TERM CURRENT USE OF INSULIN (HCC): ICD-10-CM

## 2024-11-25 RX ORDER — SEMAGLUTIDE 2.68 MG/ML
INJECTION, SOLUTION SUBCUTANEOUS
Qty: 3 ML | Refills: 0 | Status: SHIPPED | OUTPATIENT
Start: 2024-11-25

## 2024-11-25 NOTE — TELEPHONE ENCOUNTER
Pérez Helton called to request a refill on his medication.      Last office visit : 9/9/2024   Next office visit : 12/9/2024     Requested Prescriptions     Pending Prescriptions Disp Refills    semaglutide, 2 MG/DOSE, (OZEMPIC, 2 MG/DOSE,) 8 MG/3ML SOPN sc injection [Pharmacy Med Name: OZEMPIC 8 MG/3 ML (2 MG/DOSE)]       Sig: INJECT 0.75 ML INTO THE APPROPRIATE AREA(S) ONCE A WEEK AS DIRECTED            Madison Luo LPN

## 2024-11-30 DIAGNOSIS — Z76.0 MEDICATION REFILL: ICD-10-CM

## 2024-12-02 NOTE — TELEPHONE ENCOUNTER
Pérez Helton called to request a refill on his medication.      Last office visit : 9/9/2024   Next office visit : 12/9/2024     Requested Prescriptions     Pending Prescriptions Disp Refills    sildenafil (VIAGRA) 100 MG tablet [Pharmacy Med Name: Sildenafil Citrate 100 MG Oral Tablet] 8 tablet 0     Sig: TAKE 1 TABLET BY MOUTH AS NEEDED FOR ERECTILE DYSFUNCTION            Kiersten Powell MA

## 2024-12-03 RX ORDER — SILDENAFIL 100 MG/1
100 TABLET, FILM COATED ORAL PRN
Qty: 8 TABLET | Refills: 0 | Status: SHIPPED | OUTPATIENT
Start: 2024-12-03

## 2024-12-08 DIAGNOSIS — M54.12 CERVICAL RADICULOPATHY: ICD-10-CM

## 2024-12-09 ENCOUNTER — OFFICE VISIT (OUTPATIENT)
Dept: PRIMARY CARE CLINIC | Age: 44
End: 2024-12-09
Payer: COMMERCIAL

## 2024-12-09 VITALS
DIASTOLIC BLOOD PRESSURE: 88 MMHG | HEIGHT: 73 IN | BODY MASS INDEX: 41.75 KG/M2 | HEART RATE: 87 BPM | TEMPERATURE: 97.1 F | OXYGEN SATURATION: 97 % | SYSTOLIC BLOOD PRESSURE: 134 MMHG | WEIGHT: 315 LBS

## 2024-12-09 DIAGNOSIS — Z76.0 MEDICATION REFILL: ICD-10-CM

## 2024-12-09 DIAGNOSIS — E78.5 HYPERLIPIDEMIA ASSOCIATED WITH TYPE 2 DIABETES MELLITUS (HCC): ICD-10-CM

## 2024-12-09 DIAGNOSIS — E11.69 HYPERLIPIDEMIA ASSOCIATED WITH TYPE 2 DIABETES MELLITUS (HCC): ICD-10-CM

## 2024-12-09 DIAGNOSIS — E11.42 TYPE 2 DIABETES MELLITUS WITH DIABETIC POLYNEUROPATHY, WITHOUT LONG-TERM CURRENT USE OF INSULIN (HCC): Primary | ICD-10-CM

## 2024-12-09 LAB — HBA1C MFR BLD: 8.9 %

## 2024-12-09 PROCEDURE — 83036 HEMOGLOBIN GLYCOSYLATED A1C: CPT | Performed by: FAMILY MEDICINE

## 2024-12-09 PROCEDURE — 99214 OFFICE O/P EST MOD 30 MIN: CPT | Performed by: FAMILY MEDICINE

## 2024-12-09 PROCEDURE — 3052F HG A1C>EQUAL 8.0%<EQUAL 9.0%: CPT | Performed by: FAMILY MEDICINE

## 2024-12-09 RX ORDER — SEMAGLUTIDE 2.68 MG/ML
INJECTION, SOLUTION SUBCUTANEOUS
Qty: 9 ML | Refills: 0 | Status: SHIPPED | OUTPATIENT
Start: 2024-12-09

## 2024-12-09 ASSESSMENT — ENCOUNTER SYMPTOMS
SHORTNESS OF BREATH: 0
COLOR CHANGE: 0
VOMITING: 0
DIARRHEA: 0
BACK PAIN: 1
CHEST TIGHTNESS: 0
ABDOMINAL PAIN: 0
RHINORRHEA: 0
COUGH: 0
NAUSEA: 0

## 2024-12-09 NOTE — TELEPHONE ENCOUNTER
Pérez Marniton called to request a refill on his medication.      Last office visit : 9/9/2024   Next office visit : 12/9/2024     Requested Prescriptions     Pending Prescriptions Disp Refills    diclofenac (VOLTAREN) 50 MG EC tablet [Pharmacy Med Name: DICLOFENAC SOD EC 50 MG TAB] 90 tablet 3     Sig: TAKE 1 TABLET BY MOUTH THREE TIMES A DAY AS NEEDED FOR PAIN            Madison Luo, LALITN

## 2024-12-09 NOTE — PROGRESS NOTES
[facility]  [address]  [address]  [place] [address]  Dept: [phone]  Dept Fax: [phone]  Loc: [phone]      Subjective:     Chief Complaint   Patient presents with    3 Month Follow-Up       HPI:  Pérez Pinedo is a 44 y.o. male presents today for his 3 month routine follow-up.  PMHx and problem lis reviewed and updated  He is a diabetic on Ozempic. Although he lost some weight, he admits that he has not been consistent with following his diabetic diet. A1c today is 8.9%  Cholesterol is close to goal  His back has been bothering him more. He is scheduled to see back specialist this week    ROS:   Review of Systems   Constitutional:  Negative for activity change and fever.   HENT:  Negative for congestion, postnasal drip and rhinorrhea.    Respiratory:  Negative for cough, chest tightness and shortness of breath.    Cardiovascular:  Negative for chest pain.   Gastrointestinal:  Negative for abdominal pain, diarrhea, nausea and vomiting.   Genitourinary:  Negative for frequency (improved after stopping  Jardiance) and urgency.   Musculoskeletal:  Positive for back pain. Negative for arthralgias and myalgias.   Skin:  Negative for color change.   Allergic/Immunologic: Positive for environmental allergies.   Neurological:  Negative for dizziness, weakness and numbness.   Psychiatric/Behavioral:  Negative for agitation. The patient is not nervous/anxious.        PMHx:  Past Medical History:   Diagnosis Date    Cigarette nicotine dependence with nicotine-induced disorder 8/31/2020    Degenerative disc disease at L5-S1 level     Hyperlipidemia     Nerve pain     Perianal abscess 8/31/2020    Sepsis (HCC) 8/31/2020    Tobacco use disorder 9/19/2016    Type 2 diabetes mellitus without complication (HCC)     Wound dehiscence, surgical 7/7/2016     Patient Active Problem List   Diagnosis    Generalized anxiety disorder    Psychophysiological insomnia

## 2025-01-09 ENCOUNTER — OFFICE VISIT (OUTPATIENT)
Dept: PRIMARY CARE CLINIC | Age: 45
End: 2025-01-09
Payer: COMMERCIAL

## 2025-01-09 VITALS
HEIGHT: 73 IN | WEIGHT: 315 LBS | DIASTOLIC BLOOD PRESSURE: 84 MMHG | HEART RATE: 99 BPM | TEMPERATURE: 97 F | OXYGEN SATURATION: 98 % | BODY MASS INDEX: 41.75 KG/M2 | SYSTOLIC BLOOD PRESSURE: 150 MMHG

## 2025-01-09 DIAGNOSIS — J02.0 STREP PHARYNGITIS: Primary | ICD-10-CM

## 2025-01-09 DIAGNOSIS — J02.9 SORE THROAT: ICD-10-CM

## 2025-01-09 LAB — S PYO AG THROAT QL: NORMAL

## 2025-01-09 PROCEDURE — 99213 OFFICE O/P EST LOW 20 MIN: CPT | Performed by: NURSE PRACTITIONER

## 2025-01-09 PROCEDURE — 87880 STREP A ASSAY W/OPTIC: CPT | Performed by: NURSE PRACTITIONER

## 2025-01-09 RX ORDER — AZITHROMYCIN 250 MG/1
TABLET, FILM COATED ORAL
Qty: 6 TABLET | Refills: 0 | Status: SHIPPED | OUTPATIENT
Start: 2025-01-09 | End: 2025-01-19

## 2025-01-09 SDOH — ECONOMIC STABILITY: FOOD INSECURITY: WITHIN THE PAST 12 MONTHS, YOU WORRIED THAT YOUR FOOD WOULD RUN OUT BEFORE YOU GOT MONEY TO BUY MORE.: NEVER TRUE

## 2025-01-09 SDOH — ECONOMIC STABILITY: FOOD INSECURITY: WITHIN THE PAST 12 MONTHS, THE FOOD YOU BOUGHT JUST DIDN'T LAST AND YOU DIDN'T HAVE MONEY TO GET MORE.: NEVER TRUE

## 2025-01-09 ASSESSMENT — PATIENT HEALTH QUESTIONNAIRE - PHQ9
SUM OF ALL RESPONSES TO PHQ QUESTIONS 1-9: 0
2. FEELING DOWN, DEPRESSED OR HOPELESS: NOT AT ALL
SUM OF ALL RESPONSES TO PHQ QUESTIONS 1-9: 0
SUM OF ALL RESPONSES TO PHQ9 QUESTIONS 1 & 2: 0
1. LITTLE INTEREST OR PLEASURE IN DOING THINGS: NOT AT ALL

## 2025-01-09 ASSESSMENT — ENCOUNTER SYMPTOMS
CHEST TIGHTNESS: 0
SINUS PRESSURE: 1
SORE THROAT: 1
NAUSEA: 0
SHORTNESS OF BREATH: 0
COUGH: 0
ABDOMINAL PAIN: 0
VOMITING: 0
DIARRHEA: 0
COLOR CHANGE: 0

## 2025-01-09 NOTE — PROGRESS NOTES
Mr.William Pinedo is a 44 y.o. male who presents today for  Chief Complaint   Patient presents with    Ear Pain    Sweats       HPI:  Patient here today with complaints of right ear pain with associated sinus pressure that is worse on the right side. He denies any known fever, but has experienced episodes of sweating, sore throat and cough. No known ill contacts.    Review of Systems   Constitutional:  Positive for diaphoresis. Negative for activity change and fever.   HENT:  Positive for ear pain, sinus pressure and sore throat. Negative for congestion.    Respiratory:  Negative for cough, chest tightness and shortness of breath.    Cardiovascular:  Negative for chest pain.   Gastrointestinal:  Negative for abdominal pain, diarrhea, nausea and vomiting.   Genitourinary:  Negative for frequency and urgency.   Musculoskeletal:  Negative for arthralgias and myalgias.   Skin:  Negative for color change.   Neurological:  Negative for dizziness, weakness and numbness.   Psychiatric/Behavioral:  Negative for agitation. The patient is not nervous/anxious.        Past Medical History:   Diagnosis Date    Cigarette nicotine dependence with nicotine-induced disorder 8/31/2020    Degenerative disc disease at L5-S1 level     Hyperlipidemia     Nerve pain     Perianal abscess 8/31/2020    Sepsis (HCC) 8/31/2020    Tobacco use disorder 9/19/2016    Type 2 diabetes mellitus without complication (HCC)     Wound dehiscence, surgical 7/7/2016       Current Outpatient Medications   Medication Sig Dispense Refill    azithromycin (ZITHROMAX) 250 MG tablet 500mg on day 1 followed by 250mg on days 2 - 5 6 tablet 0    diclofenac (VOLTAREN) 50 MG EC tablet TAKE 1 TABLET BY MOUTH THREE TIMES A DAY AS NEEDED FOR PAIN 90 tablet 0    semaglutide, 2 MG/DOSE, (OZEMPIC, 2 MG/DOSE,) 8 MG/3ML SOPN sc injection INJECT 0.75 ML INTO THE APPROPRIATE AREA(S) ONCE A WEEK AS DIRECTED 9 mL 0    sildenafil (VIAGRA) 100 MG tablet TAKE 1 TABLET BY MOUTH AS

## 2025-01-17 DIAGNOSIS — M54.12 CERVICAL RADICULOPATHY: ICD-10-CM

## 2025-01-17 DIAGNOSIS — E11.42 TYPE 2 DIABETES MELLITUS WITH DIABETIC POLYNEUROPATHY, WITHOUT LONG-TERM CURRENT USE OF INSULIN (HCC): ICD-10-CM

## 2025-01-17 DIAGNOSIS — Z76.0 MEDICATION REFILL: ICD-10-CM

## 2025-01-20 RX ORDER — SILDENAFIL 100 MG/1
100 TABLET, FILM COATED ORAL PRN
Qty: 8 TABLET | Refills: 0 | Status: SHIPPED | OUTPATIENT
Start: 2025-01-20

## 2025-01-20 RX ORDER — GABAPENTIN 400 MG/1
CAPSULE ORAL
Qty: 90 CAPSULE | Refills: 0 | Status: SHIPPED | OUTPATIENT
Start: 2025-01-20 | End: 2025-02-20

## 2025-01-20 NOTE — TELEPHONE ENCOUNTER
Pérez Helton called to request a refill on his medication.  Jhonatan pending manual process    Last office visit : 1/9/2025   Next office visit : 3/10/2025     Last UDS:   Benzodiazepine Screen, Urine   Date Value Ref Range Status   09/18/2023 neg  Final     Buprenorphine Urine   Date Value Ref Range Status   09/18/2023 neg  Final     Cocaine Metabolite Screen, Urine   Date Value Ref Range Status   09/18/2023 neg  Final     Gabapentin Screen, Urine   Date Value Ref Range Status   09/18/2023 neg  Final     Oxycodone Screen, Ur   Date Value Ref Range Status   09/18/2023 neg  Final     Propoxyphene Screen, Urine   Date Value Ref Range Status   09/18/2023 neg  Final     THC Screen, Urine   Date Value Ref Range Status   09/18/2023 neg  Final     Tricyclic Antidepressants, Urine   Date Value Ref Range Status   09/18/2023 neg  Final       Last Jhonatan: 1/20/25  Medication Contract: 9/18/23   Last Fill: 10/14/24    Requested Prescriptions     Pending Prescriptions Disp Refills    sildenafil (VIAGRA) 100 MG tablet [Pharmacy Med Name: Sildenafil Citrate 100 MG Oral Tablet] 8 tablet 0     Sig: TAKE 1 TABLET BY MOUTH AS NEEDED FOR ERECTILE DYSFUNCTION    gabapentin (NEURONTIN) 400 MG capsule [Pharmacy Med Name: Gabapentin 400 MG Oral Capsule] 90 capsule 0     Sig: TAKE 1 CAPSULE BY MOUTH THREE TIMES DAILY FOR 30 DAYS --  *DO  NOT  EXCEED  DAILY  AMOUNT  1200  MG*         Please approve or refuse this medication.   Madison Luo LPN

## 2025-03-03 DIAGNOSIS — E11.42 TYPE 2 DIABETES MELLITUS WITH DIABETIC POLYNEUROPATHY, WITHOUT LONG-TERM CURRENT USE OF INSULIN (HCC): ICD-10-CM

## 2025-03-03 DIAGNOSIS — E78.5 HYPERLIPIDEMIA ASSOCIATED WITH TYPE 2 DIABETES MELLITUS (HCC): ICD-10-CM

## 2025-03-03 DIAGNOSIS — E11.69 HYPERLIPIDEMIA ASSOCIATED WITH TYPE 2 DIABETES MELLITUS (HCC): ICD-10-CM

## 2025-03-03 LAB
ALBUMIN SERPL-MCNC: 4.5 G/DL (ref 3.5–5.2)
ALP SERPL-CCNC: 73 U/L (ref 40–129)
ALT SERPL-CCNC: 55 U/L (ref 5–41)
ANION GAP SERPL CALCULATED.3IONS-SCNC: 9 MMOL/L (ref 8–16)
AST SERPL-CCNC: 45 U/L (ref 5–40)
BILIRUB SERPL-MCNC: 0.6 MG/DL (ref 0.2–1.2)
BUN SERPL-MCNC: 11 MG/DL (ref 6–20)
CALCIUM SERPL-MCNC: 9.3 MG/DL (ref 8.6–10)
CHLORIDE SERPL-SCNC: 100 MMOL/L (ref 98–107)
CHOLEST SERPL-MCNC: 188 MG/DL (ref 0–199)
CO2 SERPL-SCNC: 28 MMOL/L (ref 22–29)
CREAT SERPL-MCNC: 0.8 MG/DL (ref 0.7–1.2)
GLUCOSE SERPL-MCNC: 230 MG/DL (ref 70–99)
HBA1C MFR BLD: 8.9 % (ref 4–5.6)
HDLC SERPL-MCNC: 42 MG/DL (ref 40–60)
LDLC SERPL CALC-MCNC: 120 MG/DL
POTASSIUM SERPL-SCNC: 4.2 MMOL/L (ref 3.5–5.1)
PROT SERPL-MCNC: 8 G/DL (ref 6.4–8.3)
SODIUM SERPL-SCNC: 137 MMOL/L (ref 136–145)
TRIGL SERPL-MCNC: 131 MG/DL (ref 0–149)

## 2025-03-08 DIAGNOSIS — Z76.0 MEDICATION REFILL: ICD-10-CM

## 2025-03-10 ENCOUNTER — OFFICE VISIT (OUTPATIENT)
Dept: PRIMARY CARE CLINIC | Age: 45
End: 2025-03-10
Payer: COMMERCIAL

## 2025-03-10 VITALS
OXYGEN SATURATION: 98 % | SYSTOLIC BLOOD PRESSURE: 174 MMHG | HEART RATE: 92 BPM | WEIGHT: 315 LBS | HEIGHT: 73 IN | BODY MASS INDEX: 41.75 KG/M2 | DIASTOLIC BLOOD PRESSURE: 98 MMHG | TEMPERATURE: 97.6 F

## 2025-03-10 DIAGNOSIS — E66.01 MORBID OBESITY WITH BMI OF 50.0-59.9, ADULT: ICD-10-CM

## 2025-03-10 DIAGNOSIS — I10 UNCONTROLLED HYPERTENSION: ICD-10-CM

## 2025-03-10 DIAGNOSIS — Z76.0 MEDICATION REFILL: ICD-10-CM

## 2025-03-10 DIAGNOSIS — J30.2 SEASONAL ALLERGIES: ICD-10-CM

## 2025-03-10 DIAGNOSIS — J06.9 UPPER RESPIRATORY TRACT INFECTION, UNSPECIFIED TYPE: ICD-10-CM

## 2025-03-10 DIAGNOSIS — M48.07 SPINAL STENOSIS OF LUMBOSACRAL REGION: ICD-10-CM

## 2025-03-10 DIAGNOSIS — E11.65 UNCONTROLLED TYPE 2 DIABETES MELLITUS WITH HYPERGLYCEMIA (HCC): Primary | ICD-10-CM

## 2025-03-10 LAB
CREAT UR-MCNC: 85.1 MG/DL (ref 39–259)
MICROALBUMIN UR-MCNC: <1.2 MG/DL (ref 0–1.99)
MICROALBUMIN/CREAT UR-RTO: NORMAL MG/G (ref 0–29)

## 2025-03-10 PROCEDURE — 3080F DIAST BP >= 90 MM HG: CPT | Performed by: FAMILY MEDICINE

## 2025-03-10 PROCEDURE — 3077F SYST BP >= 140 MM HG: CPT | Performed by: FAMILY MEDICINE

## 2025-03-10 PROCEDURE — 99214 OFFICE O/P EST MOD 30 MIN: CPT | Performed by: FAMILY MEDICINE

## 2025-03-10 PROCEDURE — 3052F HG A1C>EQUAL 8.0%<EQUAL 9.0%: CPT | Performed by: FAMILY MEDICINE

## 2025-03-10 RX ORDER — LISINOPRIL 10 MG/1
10 TABLET ORAL DAILY
Qty: 90 TABLET | Refills: 0 | Status: SHIPPED | OUTPATIENT
Start: 2025-03-10

## 2025-03-10 RX ORDER — CETIRIZINE HYDROCHLORIDE 10 MG/1
10 TABLET ORAL DAILY
Qty: 90 TABLET | Refills: 0 | Status: SHIPPED | OUTPATIENT
Start: 2025-03-10

## 2025-03-10 RX ORDER — SILDENAFIL 100 MG/1
100 TABLET, FILM COATED ORAL PRN
Qty: 10 TABLET | Refills: 0 | Status: SHIPPED | OUTPATIENT
Start: 2025-03-10

## 2025-03-10 ASSESSMENT — ENCOUNTER SYMPTOMS
COLOR CHANGE: 0
BACK PAIN: 1
RHINORRHEA: 0
COUGH: 0
NAUSEA: 0
CHEST TIGHTNESS: 0
ABDOMINAL PAIN: 0
VOMITING: 0
SHORTNESS OF BREATH: 0
DIARRHEA: 0

## 2025-03-10 NOTE — PROGRESS NOTES
[facility]  [address]  [address]  [place] [address]  Dept: [phone]  Dept Fax: [phone]  Loc: [phone]      Subjective:     Chief Complaint   Patient presents with    3 Month Follow-Up       HPI:  Pérez Pinedo is a 44 y.o. male presents today for routine follow-up. He is accompanied by his wife. Pt acknowledges poor control of his DM. He is a . He states he has very few healthy options when he is on the road. He is usually out driving all week, comes home on weekends to rest and then drive back out again the following Monday.   He states that he was recently dx by Dr Mandel with spinal stenosis, received 1 epidural shot in his back and did experience some relief for several days. He walks with a cane now. He is due for another shot.   DM is poorly controlled. His A1c is >8%. He is Metformin and Ozempic. He states that he often missed his second dose of Metformin. He was also on Jardiance before but this was d/jose antonio because of increased frequency of urination - not pleasant when he is out on the road  BP today is also noted to be elevated. He is on low dose ACEI  He states that he has an appointment coming up to see his eye doctor    ROS:   Review of Systems   Constitutional:  Negative for activity change and fever.   HENT:  Negative for congestion, postnasal drip and rhinorrhea.    Respiratory:  Negative for cough, chest tightness and shortness of breath.    Cardiovascular:  Negative for chest pain.   Gastrointestinal:  Negative for abdominal pain, diarrhea, nausea and vomiting.   Genitourinary:  Negative for frequency (improved after stopping  Jardiance) and urgency.   Musculoskeletal:  Positive for back pain. Negative for arthralgias and myalgias.   Skin:  Negative for color change.   Allergic/Immunologic: Positive for environmental allergies.   Neurological:  Negative for dizziness, weakness and numbness.   Psychiatric/Behavioral:

## 2025-03-10 NOTE — TELEPHONE ENCOUNTER
Pérez Helton called to request a refill on his medication.      Last office visit : 1/9/2025   Next office visit : 3/10/2025     Requested Prescriptions     Pending Prescriptions Disp Refills    sildenafil (VIAGRA) 100 MG tablet [Pharmacy Med Name: Sildenafil Citrate 100 MG Oral Tablet] 8 tablet 0     Sig: TAKE 1 TABLET BY MOUTH AS NEEDED FOR ERECTILE DYSFUNCTION            Kiersten Powell MA

## 2025-03-12 DIAGNOSIS — Z76.0 MEDICATION REFILL: ICD-10-CM

## 2025-03-12 DIAGNOSIS — E11.42 TYPE 2 DIABETES MELLITUS WITH DIABETIC POLYNEUROPATHY, WITHOUT LONG-TERM CURRENT USE OF INSULIN (HCC): ICD-10-CM

## 2025-03-12 RX ORDER — SEMAGLUTIDE 2.68 MG/ML
INJECTION, SOLUTION SUBCUTANEOUS
Qty: 3 ML | Refills: 1 | Status: SHIPPED | OUTPATIENT
Start: 2025-03-12

## 2025-03-12 NOTE — TELEPHONE ENCOUNTER
Pérez Helton called to request a refill on his medication.      Last office visit : 3/10/2025   Next office visit : 4/21/2025     Requested Prescriptions     Pending Prescriptions Disp Refills    semaglutide, 2 MG/DOSE, (OZEMPIC, 2 MG/DOSE,) 8 MG/3ML SOPN sc injection [Pharmacy Med Name: OZEMPIC 8 MG/3 ML (2 MG/DOSE)] 3 mL 1     Sig: INJECT 0.75 ML INTO THE APPROPRIATE AREA(S) ONCE A WEEK AS DIRECTED            Kiersten Powell MA

## 2025-03-20 DIAGNOSIS — Z76.0 MEDICATION REFILL: ICD-10-CM

## 2025-03-20 DIAGNOSIS — E11.42 TYPE 2 DIABETES MELLITUS WITH DIABETIC POLYNEUROPATHY, WITHOUT LONG-TERM CURRENT USE OF INSULIN (HCC): ICD-10-CM

## 2025-03-20 NOTE — TELEPHONE ENCOUNTER
Pérez Helton called to request a refill on his medication.      Last office visit : 3/10/2025   Next office visit : 4/21/2025     Requested Prescriptions     Pending Prescriptions Disp Refills    metFORMIN (GLUCOPHAGE) 1000 MG tablet [Pharmacy Med Name: METFORMIN HCL 1,000 MG TABLET] 180 tablet 3     Sig: TAKE 1 TABLET BY MOUTH TWICE A DAY WITH FOOD            Kiersten Powell MA

## 2025-04-20 DIAGNOSIS — E11.42 TYPE 2 DIABETES MELLITUS WITH DIABETIC POLYNEUROPATHY, WITHOUT LONG-TERM CURRENT USE OF INSULIN (HCC): ICD-10-CM

## 2025-04-20 DIAGNOSIS — M54.12 CERVICAL RADICULOPATHY: ICD-10-CM

## 2025-04-20 DIAGNOSIS — Z76.0 MEDICATION REFILL: ICD-10-CM

## 2025-04-21 ENCOUNTER — OFFICE VISIT (OUTPATIENT)
Dept: PRIMARY CARE CLINIC | Age: 45
End: 2025-04-21
Payer: COMMERCIAL

## 2025-04-21 VITALS
HEIGHT: 73 IN | HEART RATE: 92 BPM | BODY MASS INDEX: 41.75 KG/M2 | DIASTOLIC BLOOD PRESSURE: 96 MMHG | TEMPERATURE: 97.4 F | WEIGHT: 315 LBS | SYSTOLIC BLOOD PRESSURE: 150 MMHG | OXYGEN SATURATION: 99 %

## 2025-04-21 DIAGNOSIS — I10 PRIMARY HYPERTENSION: Primary | ICD-10-CM

## 2025-04-21 DIAGNOSIS — Z76.0 MEDICATION REFILL: ICD-10-CM

## 2025-04-21 DIAGNOSIS — E11.65 UNCONTROLLED TYPE 2 DIABETES MELLITUS WITH HYPERGLYCEMIA (HCC): ICD-10-CM

## 2025-04-21 DIAGNOSIS — J30.2 SEASONAL ALLERGIES: ICD-10-CM

## 2025-04-21 PROCEDURE — 3052F HG A1C>EQUAL 8.0%<EQUAL 9.0%: CPT | Performed by: FAMILY MEDICINE

## 2025-04-21 PROCEDURE — 3077F SYST BP >= 140 MM HG: CPT | Performed by: FAMILY MEDICINE

## 2025-04-21 PROCEDURE — 3080F DIAST BP >= 90 MM HG: CPT | Performed by: FAMILY MEDICINE

## 2025-04-21 PROCEDURE — 99214 OFFICE O/P EST MOD 30 MIN: CPT | Performed by: FAMILY MEDICINE

## 2025-04-21 RX ORDER — GABAPENTIN 400 MG/1
400 CAPSULE ORAL 3 TIMES DAILY
Qty: 90 CAPSULE | Refills: 0 | Status: SHIPPED | OUTPATIENT
Start: 2025-04-21 | End: 2025-05-21

## 2025-04-21 RX ORDER — SILDENAFIL 100 MG/1
100 TABLET, FILM COATED ORAL PRN
Qty: 10 TABLET | Refills: 0 | Status: SHIPPED | OUTPATIENT
Start: 2025-04-21

## 2025-04-21 RX ORDER — LISINOPRIL 20 MG/1
20 TABLET ORAL DAILY
Qty: 90 TABLET | Refills: 0 | Status: SHIPPED | OUTPATIENT
Start: 2025-04-21

## 2025-04-21 RX ORDER — LISINOPRIL 10 MG/1
TABLET ORAL
Qty: 90 TABLET | Refills: 0 | Status: SHIPPED | OUTPATIENT
Start: 2025-04-21 | End: 2025-04-21 | Stop reason: CLARIF

## 2025-04-21 RX ORDER — FLUTICASONE PROPIONATE 50 MCG
1 SPRAY, SUSPENSION (ML) NASAL DAILY
Qty: 16 G | Refills: 0 | Status: SHIPPED | OUTPATIENT
Start: 2025-04-21

## 2025-04-21 ASSESSMENT — ENCOUNTER SYMPTOMS
DIARRHEA: 0
COUGH: 0
BACK PAIN: 1
ABDOMINAL PAIN: 0
SHORTNESS OF BREATH: 0
RHINORRHEA: 0
VOMITING: 0
NAUSEA: 0
CHEST TIGHTNESS: 0
COLOR CHANGE: 0

## 2025-04-21 NOTE — TELEPHONE ENCOUNTER
Pérez Helton called to request a refill on his medication.    Jhonatan is pending manual processing.    Last office visit : 3/10/2025   Next office visit : 4/21/2025     Last UDS:   Benzodiazepine Screen, Urine   Date Value Ref Range Status   09/18/2023 neg  Final     Buprenorphine Urine   Date Value Ref Range Status   09/18/2023 neg  Final     Cocaine Metabolite Screen, Urine   Date Value Ref Range Status   09/18/2023 neg  Final     Gabapentin Screen, Urine   Date Value Ref Range Status   09/18/2023 neg  Final     Oxycodone Screen, Ur   Date Value Ref Range Status   09/18/2023 neg  Final     Propoxyphene Screen, Urine   Date Value Ref Range Status   09/18/2023 neg  Final     THC Screen, Urine   Date Value Ref Range Status   09/18/2023 neg  Final     Tricyclic Antidepressants, Urine   Date Value Ref Range Status   09/18/2023 neg  Final       Last Jhonatan: 4/21/25  Medication Contract: 9/18/23   Last Fill: 4/21/25    Requested Prescriptions     Pending Prescriptions Disp Refills    gabapentin (NEURONTIN) 400 MG capsule [Pharmacy Med Name: Gabapentin 400 MG Oral Capsule] 90 capsule 0     Sig: Take 1 capsule by mouth 3 times daily.    sildenafil (VIAGRA) 100 MG tablet [Pharmacy Med Name: Sildenafil Citrate 100 MG Oral Tablet] 10 tablet 0     Sig: TAKE 1 TABLET BY MOUTH AS NEEDED FOR ERECTILE DYSFUNCTION         Please approve or refuse this medication.   Madison Luo LPN  
Increased productivity

## 2025-04-21 NOTE — PROGRESS NOTES
[facility]  [address]  [address]  [place] [address]  Dept: [phone]  Dept Fax: [phone]  Loc: [phone]      Subjective:     Chief Complaint   Patient presents with    Follow-up     Patient presents for fu for BP. Asked about A1C.     Cyst     On neck noticed when checking throat     Pharyngitis     Sore throat for about a week. Red and swollen but no white patches        HPI:  Pérez Pinedo is a 44 y.o. male presents today for f/up of HTN BP is noted to be elevated. His Lisinopril dose was  recently increased to 10 mg daily, He is .   He also c/o ST. No fever. No cough or congestion.   He also states that his BS is still not well controlled. He admits that he is not able to follow a strict diabetic diet. Sometimes, he also forgets to take his medication      ROS:   Review of Systems   Constitutional:  Negative for activity change and fever.   HENT:  Negative for congestion, postnasal drip and rhinorrhea.    Respiratory:  Negative for cough, chest tightness and shortness of breath.    Cardiovascular:  Negative for chest pain.   Gastrointestinal:  Negative for abdominal pain, diarrhea, nausea and vomiting.   Genitourinary:  Negative for frequency (improved after stopping  Jardiance) and urgency.   Musculoskeletal:  Positive for back pain. Negative for arthralgias and myalgias.   Skin:  Negative for color change.   Allergic/Immunologic: Positive for environmental allergies.   Neurological:  Negative for dizziness, weakness and numbness.   Psychiatric/Behavioral:  Negative for agitation. The patient is not nervous/anxious.        PMHx:  Past Medical History:   Diagnosis Date    Cigarette nicotine dependence with nicotine-induced disorder 8/31/2020    Degenerative disc disease at L5-S1 level     Hyperlipidemia     Nerve pain     Perianal abscess 8/31/2020    Sepsis (HCC) 8/31/2020    Tobacco use disorder 9/19/2016    Type 2 diabetes

## 2025-05-03 DIAGNOSIS — Z76.0 MEDICATION REFILL: ICD-10-CM

## 2025-05-03 DIAGNOSIS — E11.42 TYPE 2 DIABETES MELLITUS WITH DIABETIC POLYNEUROPATHY, WITHOUT LONG-TERM CURRENT USE OF INSULIN (HCC): ICD-10-CM

## 2025-05-05 DIAGNOSIS — M48.07 SPINAL STENOSIS OF LUMBOSACRAL REGION: ICD-10-CM

## 2025-05-05 DIAGNOSIS — Z76.0 MEDICATION REFILL: ICD-10-CM

## 2025-05-05 RX ORDER — SEMAGLUTIDE 2.68 MG/ML
INJECTION, SOLUTION SUBCUTANEOUS
Qty: 3 ML | Refills: 0 | Status: SHIPPED | OUTPATIENT
Start: 2025-05-05

## 2025-05-05 NOTE — TELEPHONE ENCOUNTER
Pérez Helton called to request a refill on his medication.      Last office visit : 4/21/2025   Next office visit : 5/5/2025     Requested Prescriptions     Pending Prescriptions Disp Refills    OZEMPIC, 2 MG/DOSE, 8 MG/3ML SOPN sc injection [Pharmacy Med Name: OZEMPIC 8 MG/3 ML (2 MG/DOSE)]  1     Sig: INJECT 0.75 ML INTO THE APPROPRIATE AREA(S) ONCE A WEEK AS DIRECTED            Shilpa Emanuel MA

## 2025-05-05 NOTE — TELEPHONE ENCOUNTER
Pérez Helton called to request a refill on his medication.      Last office visit : 4/21/2025   Next office visit : 7/28/2025     Requested Prescriptions     Pending Prescriptions Disp Refills    diclofenac (VOLTAREN) 50 MG EC tablet [Pharmacy Med Name: DICLOFENAC SOD EC 50 MG TAB] 90 tablet 0     Sig: TAKE 1 TABLET BY MOUTH THREE TIMES A DAY            Shilpa Emanuel MA   
How Severe Is Your Skin Irritation?: mild
Additional History: Pain scale 0/10.

## 2025-06-01 DIAGNOSIS — Z76.0 MEDICATION REFILL: ICD-10-CM

## 2025-06-01 DIAGNOSIS — E11.42 TYPE 2 DIABETES MELLITUS WITH DIABETIC POLYNEUROPATHY, WITHOUT LONG-TERM CURRENT USE OF INSULIN (HCC): ICD-10-CM

## 2025-06-02 RX ORDER — SEMAGLUTIDE 2.68 MG/ML
INJECTION, SOLUTION SUBCUTANEOUS
Qty: 3 ML | Refills: 0 | Status: SHIPPED | OUTPATIENT
Start: 2025-06-02

## 2025-06-02 NOTE — TELEPHONE ENCOUNTER
Pérez called requesting a refill of the below medication which has been pended for you:     Requested Prescriptions     Pending Prescriptions Disp Refills    semaglutide, 2 MG/DOSE, (OZEMPIC, 2 MG/DOSE,) 8 MG/3ML SOPN sc injection [Pharmacy Med Name: OZEMPIC 8 MG/3 ML (2 MG/DOSE)] 3 mL 0     Sig: INJECT 0.75 ML INTO THE APPROPRIATE AREA(S) ONCE A WEEK AS DIRECTED       Last Appointment Date: 4/21/2025  Next Appointment Date: 7/28/2025    No Known Allergies

## 2025-06-05 DIAGNOSIS — Z76.0 MEDICATION REFILL: ICD-10-CM

## 2025-06-05 DIAGNOSIS — J30.2 SEASONAL ALLERGIES: ICD-10-CM

## 2025-06-05 RX ORDER — CETIRIZINE HYDROCHLORIDE 10 MG/1
10 TABLET ORAL DAILY
Qty: 90 TABLET | Refills: 0 | Status: SHIPPED | OUTPATIENT
Start: 2025-06-05

## 2025-06-22 DIAGNOSIS — E11.42 TYPE 2 DIABETES MELLITUS WITH DIABETIC POLYNEUROPATHY, WITHOUT LONG-TERM CURRENT USE OF INSULIN (HCC): ICD-10-CM

## 2025-06-22 DIAGNOSIS — Z76.0 MEDICATION REFILL: ICD-10-CM

## 2025-06-23 ENCOUNTER — OFFICE VISIT (OUTPATIENT)
Dept: PRIMARY CARE CLINIC | Age: 45
End: 2025-06-23
Payer: COMMERCIAL

## 2025-06-23 VITALS
WEIGHT: 315 LBS | OXYGEN SATURATION: 98 % | SYSTOLIC BLOOD PRESSURE: 140 MMHG | DIASTOLIC BLOOD PRESSURE: 88 MMHG | RESPIRATION RATE: 18 BRPM | TEMPERATURE: 96.6 F | BODY MASS INDEX: 41.75 KG/M2 | HEIGHT: 73 IN | HEART RATE: 101 BPM

## 2025-06-23 DIAGNOSIS — L97.511 ULCER OF RIGHT FOOT, LIMITED TO BREAKDOWN OF SKIN (HCC): Primary | ICD-10-CM

## 2025-06-23 DIAGNOSIS — L97.511 SKIN ULCER OF TOE OF RIGHT FOOT, LIMITED TO BREAKDOWN OF SKIN (HCC): ICD-10-CM

## 2025-06-23 DIAGNOSIS — J30.2 SEASONAL ALLERGIES: ICD-10-CM

## 2025-06-23 PROCEDURE — 99213 OFFICE O/P EST LOW 20 MIN: CPT | Performed by: INTERNAL MEDICINE

## 2025-06-23 RX ORDER — DOXYCYCLINE HYCLATE 100 MG
100 TABLET ORAL 2 TIMES DAILY
Qty: 14 TABLET | Refills: 0 | Status: SHIPPED | OUTPATIENT
Start: 2025-06-23 | End: 2025-06-30

## 2025-06-23 RX ORDER — DOXYCYCLINE HYCLATE 100 MG
100 TABLET ORAL 2 TIMES DAILY
Qty: 14 TABLET | Refills: 0 | Status: SHIPPED | OUTPATIENT
Start: 2025-06-23 | End: 2025-06-23

## 2025-06-23 RX ORDER — FLUTICASONE PROPIONATE 50 MCG
SPRAY, SUSPENSION (ML) NASAL
Qty: 16 ML | Refills: 0 | Status: SHIPPED | OUTPATIENT
Start: 2025-06-23

## 2025-06-23 NOTE — PROGRESS NOTES
Pérez Pinedo ( 1980) is a 44 y.o. male,  Established , here for evaluation of the following chief complaint(s).  Toe Pain (Right foot with 3 sores on top of foot/toes. Red, swollen, possible infection. Non-healing with home treatments. Patient is diabetic and states he cannot feel his foot/toes. History of right leg nerve surgery and loss of feeling in the leg. )      Patient was encouraged and advised to be compliant with all  medications leads an active lifestyle and promote maintaining a healthy weight, encouraged not to use cigarettes, laboratory results discussed and reviewed with patient's during this visit       Assessment & Plan  Ulcer of right foot, limited to breakdown of skin (HCC)   Acute condition, new, patient to stay off feet, have the ulcer exposed most of the time to avoid wearing shoe, may try to do wet-to-dry dressing to remove necrotic material, must see wound care    Orders:    Coty Jordan ARNP, Wound Care, [place]    doxycycline hyclate (VIBRA-TABS) 100 MG tablet; Take 1 tablet by mouth 2 times daily for 7 days    Skin ulcer of toe of right foot, limited to breakdown of skin (HCC)   As above                  No data to display                    2025     1:47 PM 2024    11:22 AM 2023    11:26 AM 2022    10:59 AM 2022    11:57 AM   PHQ Scores   PHQ2 Score 0 0 0 0 0   PHQ9 Score 0 0 0 0 0       Results for orders placed or performed in visit on 25   Microalbumin / Creatinine Urine Ratio   Result Value Ref Range    Albumin Urine <1.20 0.00 - 1.99 mg/dL    Creatinine, Ur 85.1 39.0 - 259.0 mg/dL    Albumin/Creatinine Ratio see below 0.0 - 29.0 mg/g   Hemoglobin A1C   Result Value Ref Range    Hemoglobin A1C 8.9 (H) 4.0 - 5.6 %   Comprehensive Metabolic Panel   Result Value Ref Range    Sodium 137 136 - 145 mmol/L    Potassium 4.2 3.5 - 5.1 mmol/L    Chloride 100 98 - 107 mmol/L    CO2 28 22 - 29 mmol/L    Anion Gap 9 8 - 16 mmol/L    Glucose 230 (H)

## 2025-06-23 NOTE — TELEPHONE ENCOUNTER
Pérez Helton called to request a refill on his medication.      Last office visit : 6/23/2025   Next office visit : 7/28/2025     Requested Prescriptions     Pending Prescriptions Disp Refills    fluticasone (FLONASE) 50 MCG/ACT nasal spray [Pharmacy Med Name: FLUTICASONE PROP 50 MCG SPRAY] 16 mL 0     Sig: SPRAY 1 SPRAY INTO EACH NOSTRIL EVERY DAY            Shilpa Emanuel MA

## 2025-06-23 NOTE — ASSESSMENT & PLAN NOTE
Chronic, worsening (exacerbation), patient advised to take antibiotic, must switch shoes as pressure applied to skin will be the same for every shoe, off his feet for at least a week and see wound care

## 2025-06-23 NOTE — TELEPHONE ENCOUNTER
Pérez Helton called to request a refill on his medication.      Last office visit : 4/21/2025   Next office visit : 6/23/2025     Requested Prescriptions     Pending Prescriptions Disp Refills    metFORMIN (GLUCOPHAGE) 1000 MG tablet [Pharmacy Med Name: METFORMIN HCL 1,000 MG TABLET] 180 tablet 0     Sig: TAKE 1 TABLET BY MOUTH TWICE A DAY WITH FOOD            Shilpa Emanuel MA

## 2025-06-24 ENCOUNTER — HOSPITAL ENCOUNTER (OUTPATIENT)
Dept: WOUND CARE | Age: 45
Discharge: HOME | End: 2025-06-24
Attending: NURSE PRACTITIONER
Payer: COMMERCIAL

## 2025-06-24 VITALS
HEART RATE: 90 BPM | SYSTOLIC BLOOD PRESSURE: 160 MMHG | DIASTOLIC BLOOD PRESSURE: 90 MMHG | RESPIRATION RATE: 18 BRPM | TEMPERATURE: 97 F | WEIGHT: 315 LBS | BODY MASS INDEX: 41.75 KG/M2 | HEIGHT: 73 IN

## 2025-06-24 DIAGNOSIS — E66.01 MORBID OBESITY WITH BMI OF 50.0-59.9, ADULT (HCC): ICD-10-CM

## 2025-06-24 DIAGNOSIS — L97.512 SKIN ULCER OF TOE OF RIGHT FOOT WITH FAT LAYER EXPOSED (HCC): Primary | ICD-10-CM

## 2025-06-24 DIAGNOSIS — E11.65 UNCONTROLLED TYPE 2 DIABETES MELLITUS WITH HYPERGLYCEMIA (HCC): ICD-10-CM

## 2025-06-24 PROCEDURE — 99213 OFFICE O/P EST LOW 20 MIN: CPT

## 2025-06-24 PROCEDURE — 99215 OFFICE O/P EST HI 40 MIN: CPT | Performed by: NURSE PRACTITIONER

## 2025-06-24 PROCEDURE — 97597 DBRDMT OPN WND 1ST 20 CM/<: CPT

## 2025-06-24 PROCEDURE — 97597 DBRDMT OPN WND 1ST 20 CM/<: CPT | Performed by: NURSE PRACTITIONER

## 2025-06-24 RX ORDER — SODIUM CHLOR/HYPOCHLOROUS ACID 0.033 %
SOLUTION, IRRIGATION IRRIGATION PRN
OUTPATIENT
Start: 2025-06-24

## 2025-06-24 RX ORDER — MUPIROCIN 2 %
OINTMENT (GRAM) TOPICAL PRN
OUTPATIENT
Start: 2025-06-24

## 2025-06-24 RX ORDER — LIDOCAINE 40 MG/G
CREAM TOPICAL PRN
OUTPATIENT
Start: 2025-06-24

## 2025-06-24 RX ORDER — LIDOCAINE HYDROCHLORIDE 40 MG/ML
SOLUTION TOPICAL PRN
OUTPATIENT
Start: 2025-06-24

## 2025-06-24 RX ORDER — BACITRACIN ZINC AND POLYMYXIN B SULFATE 500; 1000 [USP'U]/G; [USP'U]/G
OINTMENT TOPICAL PRN
OUTPATIENT
Start: 2025-06-24

## 2025-06-24 RX ORDER — LIDOCAINE HYDROCHLORIDE 20 MG/ML
JELLY TOPICAL PRN
Status: DISCONTINUED | OUTPATIENT
Start: 2025-06-24 | End: 2025-06-26 | Stop reason: HOSPADM

## 2025-06-24 RX ORDER — CLOBETASOL PROPIONATE 0.5 MG/G
OINTMENT TOPICAL PRN
OUTPATIENT
Start: 2025-06-24

## 2025-06-24 RX ORDER — ASPIRIN 325 MG
325 TABLET ORAL DAILY
COMMUNITY

## 2025-06-24 RX ORDER — LIDOCAINE HYDROCHLORIDE 20 MG/ML
JELLY TOPICAL PRN
OUTPATIENT
Start: 2025-06-24

## 2025-06-24 RX ORDER — GENTAMICIN SULFATE 1 MG/G
OINTMENT TOPICAL PRN
OUTPATIENT
Start: 2025-06-24

## 2025-06-24 RX ORDER — GINSENG 100 MG
CAPSULE ORAL PRN
OUTPATIENT
Start: 2025-06-24

## 2025-06-24 RX ORDER — LIDOCAINE 50 MG/G
OINTMENT TOPICAL PRN
OUTPATIENT
Start: 2025-06-24

## 2025-06-24 RX ORDER — TRIAMCINOLONE ACETONIDE 1 MG/G
OINTMENT TOPICAL PRN
OUTPATIENT
Start: 2025-06-24

## 2025-06-24 RX ORDER — NEOMYCIN/BACITRACIN/POLYMYXINB 3.5-400-5K
OINTMENT (GRAM) TOPICAL PRN
OUTPATIENT
Start: 2025-06-24

## 2025-06-24 RX ORDER — MAGNESIUM 30 MG
30 TABLET ORAL DAILY
COMMUNITY

## 2025-06-24 RX ORDER — BETAMETHASONE DIPROPIONATE 0.5 MG/G
CREAM TOPICAL PRN
OUTPATIENT
Start: 2025-06-24

## 2025-06-24 RX ORDER — SILVER SULFADIAZINE 10 MG/G
CREAM TOPICAL PRN
OUTPATIENT
Start: 2025-06-24

## 2025-06-24 ASSESSMENT — VISUAL ACUITY: OU: 1

## 2025-06-24 NOTE — PATIENT INSTRUCTIONS
[facility] Wound Care and Hyperbaric Oxygen Therapy   Physician Orders and Discharge Instructions  [address] [place] [address]  [address]  [place] [address]  Telephone: [phone]      FAX [phone]    NAME:  Pérez Pinedo  YOB: 1980  MEDICAL RECORD NUMBER:  [id number]  DATE:  6/24/2025    Discharge condition: Stable    Discharge to: Home    Left via:Private automobile    Accompanied by:  spouse    Please go downstairs in this building to Room 103 to register. Please be aware of appointment time for this procedure.  Please arrive to room 103 at July 14 on 9 am for procedure. Please do not smoke prior to test.    2. Patient will have XRAY of right foot prior to next visit.    Please go to Room 103 downstairs to register. Procedure will be completed in Room 103. No appointment is required.    Dressing Orders:  Right toe wound: Soap and water wash.  Apply a double layer of Xeroform (the yellow sticky dressing) over the wound bed daily. Secure with medipore tape.    Treatment Orders:  Protein rich diet  Multivitamin  Keep glucose levels under 150  Avoid pressure to the wound    [facility] follow up visit ____1 week with Coty_________________________  (Please note your next appointment above and if you are unable to keep, kindly give a 24 hour notice. Thank you.)          If you experience any of the following, please call the Wound Care Center during business hours:    * Increase in Pain  * Temperature over 101  * Increase in drainage from your wound  * Drainage with a foul odor  * Bleeding  * Increase in swelling  * Need for compression bandage changes due to slippage, breakthrough drainage.    If you need medical attention outside of the business hours of the Wound Care Centers please contact your PCP or go to the nearest emergency room.

## 2025-06-24 NOTE — PROGRESS NOTES
Patient Care Team:  Yany Acosta APRN as PCP - General (Family Medicine)  Shazia Prado MD as PCP - Empaneled Provider  Samy Mandel DO as Consulting Physician (Pain Management)    TODAY'S DATE:  6/24/2025     HISTORY of PRESENTILLNESS HPI   Pérez Pinedo is a 44 y.o. male who presents today for wound evaluation.  He reports he developed a wound on right foot from his shoes-he has neuropathy.This started 2 week(s) ago. He believes this is not healing. He has been applying antibiotic ointment. He has not had  fever or chills. He has a history of diabetes mellitus (HGBa1c 8.9).  Wound Type:diabetic  Wound Location:right foot  Modifying factors:diabetes, poor glucose control, chronic pressure, and shear force    Patient Active Problem List   Diagnosis Code    Generalized anxiety disorder F41.1    Psychophysiological insomnia F51.04    Hyperlipidemia associated with type 2 diabetes mellitus ([facility]) E11.69, E78.5    Morbid obesity with BMI of 50.0-59.9, adult ([facility]) E66.01, Z68.43    Uncontrolled type 2 diabetes mellitus with hyperglycemia ([facility]) E11.65    Skin ulcer of toe of right foot with fat layer exposed ([facility]) L97.512       Pérez Pinedo is a 44 y.o. male with the following history reviewed and recorded in [facility]:    Current Outpatient Medications   Medication Sig Dispense Refill    magnesium 30 MG tablet Take 1 tablet by mouth daily      aspirin 325 MG tablet Take 1 tablet by mouth daily      metFORMIN (GLUCOPHAGE) 1000 MG tablet TAKE 1 TABLET BY MOUTH TWICE A DAY WITH FOOD 180 tablet 0    doxycycline hyclate (VIBRA-TABS) 100 MG tablet Take 1 tablet by mouth 2 times daily for 7 days 14 tablet 0    fluticasone (FLONASE) 50 MCG/ACT nasal spray SPRAY 1 SPRAY INTO EACH NOSTRIL EVERY DAY 16 mL 0    cetirizine (ZYRTEC) 10 MG tablet TAKE 1 TABLET BY MOUTH EVERY DAY 90 tablet 0    OZEMPIC, 2 MG/DOSE, 8 MG/3ML SOPN sc injection INJECT 0.75 ML INTO THE APPROPRIATE AREA(S) ONCE A WEEK AS DIRECTED 3 mL 0

## 2025-06-28 DIAGNOSIS — E11.42 TYPE 2 DIABETES MELLITUS WITH DIABETIC POLYNEUROPATHY, WITHOUT LONG-TERM CURRENT USE OF INSULIN (HCC): ICD-10-CM

## 2025-06-28 DIAGNOSIS — Z76.0 MEDICATION REFILL: ICD-10-CM

## 2025-06-30 ENCOUNTER — HOSPITAL ENCOUNTER (OUTPATIENT)
Dept: WOUND CARE | Age: 45
Discharge: HOME | End: 2025-06-30
Attending: NURSE PRACTITIONER
Payer: COMMERCIAL

## 2025-06-30 VITALS
DIASTOLIC BLOOD PRESSURE: 98 MMHG | BODY MASS INDEX: 41.75 KG/M2 | HEIGHT: 73 IN | TEMPERATURE: 97 F | HEART RATE: 100 BPM | RESPIRATION RATE: 18 BRPM | WEIGHT: 315 LBS | SYSTOLIC BLOOD PRESSURE: 158 MMHG

## 2025-06-30 DIAGNOSIS — L97.512 SKIN ULCER OF TOE OF RIGHT FOOT WITH FAT LAYER EXPOSED (HCC): Primary | ICD-10-CM

## 2025-06-30 DIAGNOSIS — E11.65 UNCONTROLLED TYPE 2 DIABETES MELLITUS WITH HYPERGLYCEMIA (HCC): ICD-10-CM

## 2025-06-30 PROCEDURE — 97597 DBRDMT OPN WND 1ST 20 CM/<: CPT | Performed by: NURSE PRACTITIONER

## 2025-06-30 PROCEDURE — 97597 DBRDMT OPN WND 1ST 20 CM/<: CPT

## 2025-06-30 RX ORDER — SILVER SULFADIAZINE 10 MG/G
CREAM TOPICAL PRN
OUTPATIENT
Start: 2025-06-30

## 2025-06-30 RX ORDER — NEOMYCIN/BACITRACIN/POLYMYXINB 3.5-400-5K
OINTMENT (GRAM) TOPICAL PRN
OUTPATIENT
Start: 2025-06-30

## 2025-06-30 RX ORDER — LIDOCAINE HYDROCHLORIDE 20 MG/ML
JELLY TOPICAL PRN
OUTPATIENT
Start: 2025-06-30

## 2025-06-30 RX ORDER — SEMAGLUTIDE 2.68 MG/ML
INJECTION, SOLUTION SUBCUTANEOUS
Qty: 3 ML | Refills: 0 | Status: SHIPPED | OUTPATIENT
Start: 2025-06-30

## 2025-06-30 RX ORDER — GENTAMICIN SULFATE 1 MG/G
OINTMENT TOPICAL PRN
OUTPATIENT
Start: 2025-06-30

## 2025-06-30 RX ORDER — SODIUM CHLOR/HYPOCHLOROUS ACID 0.033 %
SOLUTION, IRRIGATION IRRIGATION PRN
OUTPATIENT
Start: 2025-06-30

## 2025-06-30 RX ORDER — LIDOCAINE 40 MG/G
CREAM TOPICAL PRN
OUTPATIENT
Start: 2025-06-30

## 2025-06-30 RX ORDER — BACITRACIN ZINC AND POLYMYXIN B SULFATE 500; 1000 [USP'U]/G; [USP'U]/G
OINTMENT TOPICAL PRN
OUTPATIENT
Start: 2025-06-30

## 2025-06-30 RX ORDER — BETAMETHASONE DIPROPIONATE 0.5 MG/G
CREAM TOPICAL PRN
OUTPATIENT
Start: 2025-06-30

## 2025-06-30 RX ORDER — GINSENG 100 MG
CAPSULE ORAL PRN
OUTPATIENT
Start: 2025-06-30

## 2025-06-30 RX ORDER — LIDOCAINE HYDROCHLORIDE 20 MG/ML
JELLY TOPICAL PRN
Status: DISCONTINUED | OUTPATIENT
Start: 2025-06-30 | End: 2025-07-02 | Stop reason: HOSPADM

## 2025-06-30 RX ORDER — LIDOCAINE HYDROCHLORIDE 40 MG/ML
SOLUTION TOPICAL PRN
OUTPATIENT
Start: 2025-06-30

## 2025-06-30 RX ORDER — CLOBETASOL PROPIONATE 0.5 MG/G
OINTMENT TOPICAL PRN
OUTPATIENT
Start: 2025-06-30

## 2025-06-30 RX ORDER — MUPIROCIN 2 %
OINTMENT (GRAM) TOPICAL PRN
OUTPATIENT
Start: 2025-06-30

## 2025-06-30 RX ORDER — LIDOCAINE 50 MG/G
OINTMENT TOPICAL PRN
OUTPATIENT
Start: 2025-06-30

## 2025-06-30 RX ORDER — TRIAMCINOLONE ACETONIDE 1 MG/G
OINTMENT TOPICAL PRN
OUTPATIENT
Start: 2025-06-30

## 2025-06-30 NOTE — PATIENT INSTRUCTIONS
[facility] Wound Care and Hyperbaric Oxygen Therapy   Physician Orders and Discharge Instructions  [address] [place] [address]  [address]  [place] [address]  Telephone: [phone]      FAX [phone]    NAME:  Pérez Pinedo  YOB: 1980  MEDICAL RECORD NUMBER:  [id number]  DATE:  6/30/2025    Discharge condition: Stable    Discharge to: Home    Left via:Private automobile    Accompanied by:  spouse    Please go downstairs in this building to Room 103 to register. Please be aware of appointment time for this procedure.  Please arrive to room 103 at July 14 on 9 am for procedure. Please do not smoke prior to test.    2. Patient will have XRAY of right foot prior to next visit.    Please go to Room 103 downstairs to register. Procedure will be completed in Room 103. No appointment is required.    Dressing Orders:  Right toe wound: Soap and water wash.  Apply a double layer of Xeroform (the yellow sticky dressing) over the wound bed daily. Secure with medipore tape.    Treatment Orders:  Protein rich diet  Multivitamin  Keep glucose levels under 150  Avoid pressure to the wound    [facility] follow up visit ____1 week with Coty_________________________  (Please note your next appointment above and if you are unable to keep, kindly give a 24 hour notice. Thank you.)          If you experience any of the following, please call the Wound Care Center during business hours:    * Increase in Pain  * Temperature over 101  * Increase in drainage from your wound  * Drainage with a foul odor  * Bleeding  * Increase in swelling  * Need for compression bandage changes due to slippage, breakthrough drainage.    If you need medical attention outside of the business hours of the Wound Care Centers please contact your PCP or go to the nearest emergency room.

## 2025-06-30 NOTE — WOUND CARE
[facility]          [address] [place] [address], [address]          [place] [address]               Patient:  Pérez Pinedo   YOB: 1980   Date of Visit:  6/30/2025       To Whom It May Concern:    Please excuse Pérez Pinedo 6/30/2025 from work, he is healing from a wound and need to not wear a shoe until reevaluated on July 7th.  He was at the office for an appointment today.    If you have any questions or concerns, please don't hesitate to call.    Sincerely,        Electronically signed by ALBERT Avila CNP on 6/30/25 at 10:03 AM CDT          \

## 2025-06-30 NOTE — PROGRESS NOTES
without deformity, nasal mucosa and turbinates normal without polyps  Neck: supple and non-tender without mass, no thyromegaly or thyroid nodules, no cervical lymphadenopathy  Pulmonary/Chest: clear to auscultation bilaterally- no wheezes, rales or rhonchi, normal air movement, no respiratory distress  Extremities: no cyanosis, clubbing or edema  Musculoskeletal: normal range of motion, no joint swelling, deformity or tenderness  Neurologic: reflexes normal and symmetric, no cranial nerve deficit, gait, coordination and speech normal      Assessment:      Patient Active Problem List   Diagnosis Code    Generalized anxiety disorder F41.1    Psychophysiological insomnia F51.04    Hyperlipidemia associated with type 2 diabetes mellitus ([facility]) E11.69, E78.5    Morbid obesity with BMI of 50.0-59.9, adult ([facility]) E66.01, Z68.43    Uncontrolled type 2 diabetes mellitus with hyperglycemia ([facility]) E11.65    Skin ulcer of toe of right foot with fat layer exposed ([facility]) L97.512       Wound 06/24/25 Toe (Comment  which one) Right wound 1-right toe diabetic wag 1 (Active)   Wound Image   06/30/25 0954   Wound Etiology Diabetic Porter 1 06/30/25 0954   Dressing Status Old drainage noted 06/30/25 0954   Wound Cleansed Soap and water 06/30/25 0954   Dressing/Treatment Xeroform;Dry dressing 06/30/25 1010   Offloading for Diabetic Foot Ulcers Other (comment) 06/30/25 1010   Wound Length (cm) 0.5 cm 06/30/25 0954   Wound Width (cm) 0.5 cm 06/30/25 0954   Wound Depth (cm) 0.1 cm 06/30/25 0954   Wound Surface Area (cm^2) 0.25 cm^2 06/30/25 0954   Change in Wound Size % (l*w) 64.29 06/30/25 0954   Wound Volume (cm^3) 0.025 cm^3 06/30/25 0954   Wound Healing % 64 06/30/25 0954   Post-Procedure Length (cm) 0.5 cm 06/30/25 1010   Post-Procedure Width (cm) 0.5 cm 06/30/25 1010   Post-Procedure Depth (cm) 0.1 cm 06/30/25 1010   Post-Procedure Surface Area (cm^2) 0.25 cm^2 06/30/25 1010   Post-Procedure Volume (cm^3) 0.025 cm^3 06/30/25 1010   Wound

## 2025-07-07 ENCOUNTER — HOSPITAL ENCOUNTER (OUTPATIENT)
Dept: WOUND CARE | Age: 45
Discharge: HOME | End: 2025-07-07
Attending: NURSE PRACTITIONER
Payer: COMMERCIAL

## 2025-07-07 VITALS
TEMPERATURE: 97 F | HEIGHT: 73 IN | RESPIRATION RATE: 18 BRPM | SYSTOLIC BLOOD PRESSURE: 158 MMHG | WEIGHT: 315 LBS | DIASTOLIC BLOOD PRESSURE: 98 MMHG | BODY MASS INDEX: 41.75 KG/M2 | HEART RATE: 100 BPM

## 2025-07-07 DIAGNOSIS — Z87.2 PERSONAL HISTORY OF DISEASE OF SKIN AND SUBCUTANEOUS TISSUE: ICD-10-CM

## 2025-07-07 DIAGNOSIS — Z09 FOLLOW-UP EXAM: ICD-10-CM

## 2025-07-07 DIAGNOSIS — L97.512 SKIN ULCER OF TOE OF RIGHT FOOT WITH FAT LAYER EXPOSED (HCC): Primary | ICD-10-CM

## 2025-07-07 DIAGNOSIS — E66.01 MORBID OBESITY WITH BMI OF 50.0-59.9, ADULT (HCC): ICD-10-CM

## 2025-07-07 DIAGNOSIS — E11.65 UNCONTROLLED TYPE 2 DIABETES MELLITUS WITH HYPERGLYCEMIA (HCC): ICD-10-CM

## 2025-07-07 PROCEDURE — 99213 OFFICE O/P EST LOW 20 MIN: CPT

## 2025-07-07 PROCEDURE — 99213 OFFICE O/P EST LOW 20 MIN: CPT | Performed by: NURSE PRACTITIONER

## 2025-07-07 RX ORDER — SILVER SULFADIAZINE 10 MG/G
CREAM TOPICAL PRN
Status: CANCELLED | OUTPATIENT
Start: 2025-07-07

## 2025-07-07 RX ORDER — MUPIROCIN 2 %
OINTMENT (GRAM) TOPICAL PRN
Status: CANCELLED | OUTPATIENT
Start: 2025-07-07

## 2025-07-07 RX ORDER — BETAMETHASONE DIPROPIONATE 0.5 MG/G
CREAM TOPICAL PRN
Status: CANCELLED | OUTPATIENT
Start: 2025-07-07

## 2025-07-07 RX ORDER — BACITRACIN ZINC AND POLYMYXIN B SULFATE 500; 1000 [USP'U]/G; [USP'U]/G
OINTMENT TOPICAL PRN
Status: CANCELLED | OUTPATIENT
Start: 2025-07-07

## 2025-07-07 RX ORDER — CLOBETASOL PROPIONATE 0.5 MG/G
OINTMENT TOPICAL PRN
Status: CANCELLED | OUTPATIENT
Start: 2025-07-07

## 2025-07-07 RX ORDER — LIDOCAINE HYDROCHLORIDE 20 MG/ML
JELLY TOPICAL PRN
Status: CANCELLED | OUTPATIENT
Start: 2025-07-07

## 2025-07-07 RX ORDER — GINSENG 100 MG
CAPSULE ORAL PRN
Status: CANCELLED | OUTPATIENT
Start: 2025-07-07

## 2025-07-07 RX ORDER — LIDOCAINE 50 MG/G
OINTMENT TOPICAL PRN
Status: CANCELLED | OUTPATIENT
Start: 2025-07-07

## 2025-07-07 RX ORDER — NEOMYCIN/BACITRACIN/POLYMYXINB 3.5-400-5K
OINTMENT (GRAM) TOPICAL PRN
Status: CANCELLED | OUTPATIENT
Start: 2025-07-07

## 2025-07-07 RX ORDER — LIDOCAINE HYDROCHLORIDE 40 MG/ML
SOLUTION TOPICAL PRN
Status: CANCELLED | OUTPATIENT
Start: 2025-07-07

## 2025-07-07 RX ORDER — SODIUM CHLOR/HYPOCHLOROUS ACID 0.033 %
SOLUTION, IRRIGATION IRRIGATION PRN
Status: CANCELLED | OUTPATIENT
Start: 2025-07-07

## 2025-07-07 RX ORDER — TRIAMCINOLONE ACETONIDE 1 MG/G
OINTMENT TOPICAL PRN
Status: CANCELLED | OUTPATIENT
Start: 2025-07-07

## 2025-07-07 RX ORDER — GENTAMICIN SULFATE 1 MG/G
OINTMENT TOPICAL PRN
Status: CANCELLED | OUTPATIENT
Start: 2025-07-07

## 2025-07-07 RX ORDER — LIDOCAINE 40 MG/G
CREAM TOPICAL PRN
Status: CANCELLED | OUTPATIENT
Start: 2025-07-07

## 2025-07-07 NOTE — WOUND CARE
[facility]          [address] [place] [address], [address]          [place] [address]               Patient:  Pérez Pinedo   YOB: 1980   Date of Visit:  7/7/2025       To Whom It May Concern:    Please excuse Pérez Pinedo 7/7/2025 from work.  They were at the office for an appointment today. He is cleared to return to work but does need to avoid pressure to current wound on ankle.    If you have any questions or concerns, please don't hesitate to call.    Sincerely,        Electronically signed by ALBERT Avila CNP on 7/7/25 at 11:04 AM JERODT

## 2025-07-07 NOTE — PATIENT INSTRUCTIONS
[facility]   Physician Orders and Discharge Instructions  [address] [place] [address]  [address]  [place] [address]  Telephone: [phone]      FAX [phone]    NAME:  Pérez Pinedo  YOB: 1980  MEDICAL RECORD NUMBER:  [id number]  DATE:  7/7/2025    Discharge condition: Stable    Discharge to: Home    Left via:Private automobile    Accompanied by:  spouse    Diabetic Foot Care    Diabetes can lead to many different types of foot complications, including athlete's foot (a fungal infection), calluses, bunions and other foot deformities, or ulcers that can range from a surface wound to a deep infection.  You will need to check your feet twice daily and give them special care and attention.    1) Wash with lukewarm water and a mild soap.  Dry well between your toes. Do not soak your feet unless instructed to do so by a physician.        Moisturize your feet with a good thick cream like Eucerin Cream, Aquaphor or Cocoa Butter (in a jar) at least twice daily. Do not apply moisturizer between toes.    2)  Protect your feet and never go barefoot.  Wear slippers around the house. Treat even minor wounds and skin cracks as an urgent matter when you have diabetes.  Remember early treatment is essential to avoid more advanced problems.    3) Check your feet daily or have someone else check them if your eyesight is limited.  If you live alone try using a handheld mirror.  Watch for a red spot that persists or callus formation.     4)  Look and feel inside your shoes before putting them on.  Inspect the soles for nails or screws.       5)  If you form callus or thickened skin on your feet use Flexitol Heel Balm once daily alternating with your regular moisturizer.  Flexitol Heel [place] is available at [employer] ($12) and other pharmacies.    6) If you form callus this is a sign that this area is getting too much pressure or rubbing in your shoe. You may use a

## 2025-07-07 NOTE — PROGRESS NOTES
Assessment Epithelialization 07/07/25 1054   Drainage Amount None (dry) 07/07/25 1054   Drainage Description Serosanguinous 06/30/25 0954   Odor None 07/07/25 1054   Kat-wound Assessment Intact 07/07/25 1054   Margins Defined edges 07/07/25 1054   Wound Thickness Description not for Pressure Injury Full thickness 06/30/25 0954   Number of days: 12       Plan:     Problem List Items Addressed This Visit          Endocrine    Uncontrolled type 2 diabetes mellitus with hyperglycemia ([facility])       Musculoskeletal and Integument    Personal history of disease of skin and subcutaneous tissue       Other    Morbid obesity with BMI of 50.0-59.9, adult ([facility])    * (Principal) Skin ulcer of toe of right foot with fat layer exposed ([facility]) - Primary    Relevant Orders    Initiate Outpatient Wound Care Protocol    Follow-up exam       Treatment Note please see attached Discharge Instructions    In my professional opinion this patient would benefit from HBO Therapy: No    Written patient dismissal instructions given to patient and signed by patient or POA.           Mr. Pinedo's toe wound is healed. He did develop a new wound to the right ankle from prolonged pressure while working on something in the floor. He does not want care for this wound, he does not want the FRANCESCO to be performed, and does not want follow up appointment. We discussed impertinence of offloading. He states he can safely wear a shoe to work and continue to offload the ankle while wearing a shoe. I am clearing him to go back to work and instructed him to return if he has problems with the ankle.  Electronically signed by ALBERT Avila CNP on 7/7/2025 at 11:13 AM

## 2025-07-26 DIAGNOSIS — E11.42 TYPE 2 DIABETES MELLITUS WITH DIABETIC POLYNEUROPATHY, WITHOUT LONG-TERM CURRENT USE OF INSULIN (HCC): ICD-10-CM

## 2025-07-26 DIAGNOSIS — Z76.0 MEDICATION REFILL: ICD-10-CM

## 2025-07-28 ENCOUNTER — OFFICE VISIT (OUTPATIENT)
Dept: PRIMARY CARE CLINIC | Age: 45
End: 2025-07-28
Payer: COMMERCIAL

## 2025-07-28 VITALS
HEIGHT: 73 IN | TEMPERATURE: 97.9 F | HEART RATE: 83 BPM | BODY MASS INDEX: 41.75 KG/M2 | WEIGHT: 315 LBS | DIASTOLIC BLOOD PRESSURE: 96 MMHG | SYSTOLIC BLOOD PRESSURE: 160 MMHG | OXYGEN SATURATION: 99 %

## 2025-07-28 DIAGNOSIS — M48.07 SPINAL STENOSIS OF LUMBOSACRAL REGION: ICD-10-CM

## 2025-07-28 DIAGNOSIS — E78.2 MIXED HYPERLIPIDEMIA: ICD-10-CM

## 2025-07-28 DIAGNOSIS — Z51.81 MEDICATION MONITORING ENCOUNTER: ICD-10-CM

## 2025-07-28 DIAGNOSIS — E11.65 UNCONTROLLED TYPE 2 DIABETES MELLITUS WITH HYPERGLYCEMIA (HCC): ICD-10-CM

## 2025-07-28 DIAGNOSIS — R26.89 IMBALANCE: ICD-10-CM

## 2025-07-28 DIAGNOSIS — G89.29 CHRONIC BILATERAL LOW BACK PAIN WITH RIGHT-SIDED SCIATICA: ICD-10-CM

## 2025-07-28 DIAGNOSIS — R27.0 ATAXIA: ICD-10-CM

## 2025-07-28 DIAGNOSIS — L03.313 CELLULITIS OF CHEST WALL: ICD-10-CM

## 2025-07-28 DIAGNOSIS — I10 PRIMARY HYPERTENSION: ICD-10-CM

## 2025-07-28 DIAGNOSIS — M54.41 CHRONIC BILATERAL LOW BACK PAIN WITH RIGHT-SIDED SCIATICA: ICD-10-CM

## 2025-07-28 DIAGNOSIS — Z76.89 ENCOUNTER TO ESTABLISH CARE: ICD-10-CM

## 2025-07-28 DIAGNOSIS — E11.42 DIABETIC PERIPHERAL NEUROPATHY (HCC): ICD-10-CM

## 2025-07-28 LAB
ALCOHOL URINE: NORMAL
AMPHETAMINE SCREEN URINE: NORMAL
BARBITURATE SCREEN URINE: NORMAL
BENZODIAZEPINE SCREEN, URINE: NORMAL
BUPRENORPHINE URINE: NORMAL
COCAINE METABOLITE SCREEN URINE: NORMAL
FENTANYL SCREEN, URINE: NORMAL
GABAPENTIN SCREEN, URINE: NORMAL
HBA1C MFR BLD: 8.5 %
MDMA, URINE: NORMAL
METHADONE SCREEN, URINE: NORMAL
METHAMPHETAMINE, URINE: NORMAL
OPIATE SCREEN URINE: NORMAL
OXYCODONE SCREEN URINE: NORMAL
PHENCYCLIDINE SCREEN URINE: NORMAL
PROPOXYPHENE SCREEN, URINE: NORMAL
SYNTHETIC CANNABINOIDS(K2) SCREEN, URINE: NORMAL
THC SCREEN, URINE: NORMAL
TRAMADOL SCREEN URINE: NORMAL
TRICYCLIC ANTIDEPRESSANTS, UR: NORMAL

## 2025-07-28 PROCEDURE — 3080F DIAST BP >= 90 MM HG: CPT | Performed by: NURSE PRACTITIONER

## 2025-07-28 PROCEDURE — 83036 HEMOGLOBIN GLYCOSYLATED A1C: CPT | Performed by: NURSE PRACTITIONER

## 2025-07-28 PROCEDURE — 3077F SYST BP >= 140 MM HG: CPT | Performed by: NURSE PRACTITIONER

## 2025-07-28 PROCEDURE — 99215 OFFICE O/P EST HI 40 MIN: CPT | Performed by: NURSE PRACTITIONER

## 2025-07-28 PROCEDURE — 3052F HG A1C>EQUAL 8.0%<EQUAL 9.0%: CPT | Performed by: NURSE PRACTITIONER

## 2025-07-28 PROCEDURE — 80305 DRUG TEST PRSMV DIR OPT OBS: CPT | Performed by: NURSE PRACTITIONER

## 2025-07-28 RX ORDER — CEPHALEXIN 500 MG/1
500 CAPSULE ORAL 2 TIMES DAILY
Qty: 20 CAPSULE | Refills: 0 | Status: SHIPPED | OUTPATIENT
Start: 2025-07-28 | End: 2025-08-07

## 2025-07-28 RX ORDER — LISINOPRIL 40 MG/1
40 TABLET ORAL DAILY
Qty: 90 TABLET | Refills: 0 | Status: SHIPPED | OUTPATIENT
Start: 2025-07-28

## 2025-07-28 RX ORDER — MUPIROCIN 2 %
OINTMENT (GRAM) TOPICAL
Qty: 15 G | Refills: 0 | Status: SHIPPED | OUTPATIENT
Start: 2025-07-28 | End: 2025-08-04

## 2025-07-28 RX ORDER — SEMAGLUTIDE 2.68 MG/ML
INJECTION, SOLUTION SUBCUTANEOUS
Qty: 3 ML | Refills: 0 | Status: SHIPPED | OUTPATIENT
Start: 2025-07-28 | End: 2025-07-28 | Stop reason: ALTCHOICE

## 2025-07-28 ASSESSMENT — ENCOUNTER SYMPTOMS
DIARRHEA: 0
SORE THROAT: 0
NAUSEA: 0
COUGH: 0
CHEST TIGHTNESS: 0
BACK PAIN: 1
ABDOMINAL PAIN: 0
WHEEZING: 0
SHORTNESS OF BREATH: 0

## 2025-07-28 NOTE — PROGRESS NOTES
Mr.William Pinedo is a 44 y.o. male who presents today for  Chief Complaint   Patient presents with    Established New Doctor     Says he is needing some referrals to some specialist. He is having some back pain and has trouble standing. Needs UDS and contract        HPI:  History of Present Illness  The patient presents to transition care from Dr. Prado.    Hypertension  He has been taking lisinopril 20 mg daily for his blood pressure, although he missed a dose a few days ago.  His dose was increased in April.  He is not on any other antihypertensive medications.   He expresses concern about potential side effects of diuretics due to his occupation as a .  He has had no chest pain or shortness of breath.    Diabetes Mellitus  He is currently on metformin twice daily and Ozempic 2 mg weekly for his diabetes, which he tolerates well without significant gastrointestinal issues, constipation, or nausea.   He reports often forgetting his second dose of metformin due to his busy schedule.   He discontinued Jardiance due to excessive urination and rash development. He also had a negative similar reaction to Farxiga.   His A1c level was 8.9 in March.     Hyperlipidemia  He takes atorvastatin 20 mg daily but reports that it causes muscle pain.  He rarely takes it.  He has not tried any other cholesterol-lowering medications in the past.    He has chronic neuropathy affecting right leg.  He underwent peroneal nerve release in 2016, which unfortunately did not alleviate his symptoms. He experiences constant numbness and pain in his right leg.  He also reports mild neuropathy in his left leg. His condition has remained stable without any worsening.   He takes gabapentin 400 mg once daily, occasionally increasing the dose to twice daily when needed. He finds that taking gabapentin in the morning and at night provides better relief, but he prefers to avoid medication when possible.  He is prescribed 3 times daily

## 2025-07-30 ENCOUNTER — TELEPHONE (OUTPATIENT)
Dept: NEUROSURGERY | Age: 45
End: 2025-07-30

## 2025-07-30 NOTE — TELEPHONE ENCOUNTER
[facility] New Patient Questionnaire    Diagnosis/Reason for Referral?     DX:   M48.07 (ICD-10-CM) - Spinal stenosis of lumbosacral region   R26.89 (ICD-10-CM) - Imbalance   G89.29, M54.41 (ICD-10-CM) - Chronic bilateral low back pain with right-sided sciatica       2. Who is completing questionnaire?      Patient X Caregiver Family      3. Has the patient had any previous spinal/brain surgeries?     NO      A. If yes, what is the name of the facility in which the surgery was performed?       B. Procedure/Surgery performed?       C. Who was the surgeon?       D. When was the surgery?    MM/YY       E. Did the patient improve after the surgery?        4. Is this a second opinion?   If yes, Dr. Rodriguez would like to review patient first before making the appointment.      5. Have MRI Images been obtain within the last year?     Yes X  No      XR X  CT     If yes, where was the imaging performed?     [facility]   If yes, what part of the body?     Lumbar X Cervical  Thoracic  Brain     If yes, when was it obtained?      12/16/2024 (XR) & 1/6/2025 (MRI)    Note: if the scan was performed at a facility other than [facility], the disc will need to be brought to the appointment or we need to reach out to obtain the disc.     A. Was the patient instructed to provide the disc?      Yes   No X       8. Has the patient had a NCV/EMG within the last year?      Yes  No X     If yes, where was it performed and date?      MM/YY  Location:      9. Has the patient been to Physical Therapy?      Yes  No X     If yes, what location, how long attended, and last visit?    Location:        Therapy Lasted:    Date of Last Visit:      10. Has the patient been to Pain Management?     Yes  No X     If yes, what location and last visit     Location:   Last Visit:   Is it helping?

## 2025-08-06 PROBLEM — Z09 FOLLOW-UP EXAM: Status: RESOLVED | Noted: 2025-07-07 | Resolved: 2025-08-06

## 2025-08-14 DIAGNOSIS — J30.2 SEASONAL ALLERGIES: ICD-10-CM

## 2025-08-14 RX ORDER — FLUTICASONE PROPIONATE 50 MCG
SPRAY, SUSPENSION (ML) NASAL
Qty: 24 ML | Refills: 1 | Status: SHIPPED | OUTPATIENT
Start: 2025-08-14

## 2025-09-04 ENCOUNTER — OFFICE VISIT (OUTPATIENT)
Dept: NEUROSURGERY | Age: 45
End: 2025-09-04
Payer: COMMERCIAL

## 2025-09-04 VITALS
HEIGHT: 72 IN | WEIGHT: 315 LBS | HEART RATE: 83 BPM | SYSTOLIC BLOOD PRESSURE: 169 MMHG | DIASTOLIC BLOOD PRESSURE: 83 MMHG | BODY MASS INDEX: 42.66 KG/M2

## 2025-09-04 DIAGNOSIS — M21.371 RIGHT FOOT DROP: ICD-10-CM

## 2025-09-04 DIAGNOSIS — G89.29 CHRONIC MIDLINE LOW BACK PAIN WITH RIGHT-SIDED SCIATICA: ICD-10-CM

## 2025-09-04 DIAGNOSIS — M54.41 CHRONIC MIDLINE LOW BACK PAIN WITH RIGHT-SIDED SCIATICA: ICD-10-CM

## 2025-09-04 DIAGNOSIS — M51.362 DEGENERATION OF INTERVERTEBRAL DISC OF LUMBAR REGION WITH DISCOGENIC BACK PAIN AND LOWER EXTREMITY PAIN: Primary | ICD-10-CM

## 2025-09-04 DIAGNOSIS — M48.061 SPINAL STENOSIS OF LUMBAR REGION WITHOUT NEUROGENIC CLAUDICATION: ICD-10-CM

## 2025-09-04 PROCEDURE — 3079F DIAST BP 80-89 MM HG: CPT | Performed by: NURSE PRACTITIONER

## 2025-09-04 PROCEDURE — 3077F SYST BP >= 140 MM HG: CPT | Performed by: NURSE PRACTITIONER

## 2025-09-04 PROCEDURE — 99204 OFFICE O/P NEW MOD 45 MIN: CPT | Performed by: NURSE PRACTITIONER

## 2025-09-04 ASSESSMENT — ENCOUNTER SYMPTOMS
BACK PAIN: 1
GASTROINTESTINAL NEGATIVE: 1
EYES NEGATIVE: 1
RESPIRATORY NEGATIVE: 1